# Patient Record
Sex: MALE | Race: WHITE | NOT HISPANIC OR LATINO | Employment: OTHER | URBAN - METROPOLITAN AREA
[De-identification: names, ages, dates, MRNs, and addresses within clinical notes are randomized per-mention and may not be internally consistent; named-entity substitution may affect disease eponyms.]

---

## 2017-03-30 DIAGNOSIS — I10 BENIGN ESSENTIAL HYPERTENSION: ICD-10-CM

## 2017-03-30 RX ORDER — LISINOPRIL AND HYDROCHLOROTHIAZIDE 12.5; 2 MG/1; MG/1
TABLET ORAL
Qty: 90 TABLET | Refills: 0 | Status: SHIPPED | OUTPATIENT
Start: 2017-03-30 | End: 2017-06-24

## 2017-03-30 NOTE — TELEPHONE ENCOUNTER
Prescription approved per [facility].  Juani Forrester RN

## 2017-03-30 NOTE — TELEPHONE ENCOUNTER
lisinopril-hydrochlorothiazide (PRINZIDE,ZESTORETIC) 20-12.5 MG per tablet 90 tablet 0 9/29/2016  No   Sig: TAKE 1 TABLET BY MOUTH DAILY           Last Written Prescription Date: 09/29/2016  Last Fill Quantity: 90, # refills: 0  Last Office Visit with [facility], P or [facility] prescribing provider: 01/2017       Potassium   Date Value Ref Range Status   06/06/2016 3.8 3.4 - 5.3 mmol/L Final     Creatinine   Date Value Ref Range Status   06/06/2016 0.80 0.66 - 1.25 mg/dL Final     BP Readings from Last 3 Encounters:   12/07/16 126/80   06/06/16 136/86   04/06/16 156/89

## 2017-06-24 DIAGNOSIS — I10 BENIGN ESSENTIAL HYPERTENSION: ICD-10-CM

## 2017-06-26 RX ORDER — LISINOPRIL AND HYDROCHLOROTHIAZIDE 12.5; 2 MG/1; MG/1
TABLET ORAL
Qty: 90 TABLET | Refills: 0 | Status: SHIPPED | OUTPATIENT
Start: 2017-06-26 | End: 2017-08-10

## 2017-06-26 NOTE — TELEPHONE ENCOUNTER
Routing to TC pool. Pt is due for full physical with labs. Please help him schedule. 90 day script sent in.     Shira Degroot RN

## 2017-08-10 ENCOUNTER — OFFICE VISIT (OUTPATIENT)
Dept: FAMILY MEDICINE | Facility: CLINIC | Age: 57
End: 2017-08-10
Payer: COMMERCIAL

## 2017-08-10 VITALS
RESPIRATION RATE: 18 BRPM | DIASTOLIC BLOOD PRESSURE: 84 MMHG | OXYGEN SATURATION: 98 % | HEART RATE: 79 BPM | TEMPERATURE: 97.8 F | WEIGHT: 238 LBS | HEIGHT: 73 IN | BODY MASS INDEX: 31.54 KG/M2 | SYSTOLIC BLOOD PRESSURE: 137 MMHG

## 2017-08-10 DIAGNOSIS — Z11.59 NEED FOR HEPATITIS C SCREENING TEST: ICD-10-CM

## 2017-08-10 DIAGNOSIS — Z12.5 SCREENING FOR PROSTATE CANCER: ICD-10-CM

## 2017-08-10 DIAGNOSIS — F17.200 TOBACCO USE DISORDER: ICD-10-CM

## 2017-08-10 DIAGNOSIS — I10 BENIGN ESSENTIAL HYPERTENSION: ICD-10-CM

## 2017-08-10 DIAGNOSIS — D12.6 ADENOMATOUS POLYP OF COLON, UNSPECIFIED PART OF COLON: ICD-10-CM

## 2017-08-10 DIAGNOSIS — Z00.00 ROUTINE GENERAL MEDICAL EXAMINATION AT A HEALTH CARE FACILITY: Primary | ICD-10-CM

## 2017-08-10 PROCEDURE — 99396 PREV VISIT EST AGE 40-64: CPT | Performed by: INTERNAL MEDICINE

## 2017-08-10 RX ORDER — LISINOPRIL AND HYDROCHLOROTHIAZIDE 12.5; 2 MG/1; MG/1
1 TABLET ORAL DAILY
Qty: 90 TABLET | Refills: 3 | Status: SHIPPED | OUTPATIENT
Start: 2017-08-10 | End: 2018-08-20

## 2017-08-10 NOTE — MR AVS SNAPSHOT
After Visit Summary   8/10/2017    Ricki Acuna    MRN: [id number]           Patient Information     Date Of Birth          1960        Visit Information        Provider Department      8/10/2017 9:00 AM Bryan Fragoso MD [facility]        Today's Diagnoses     Routine general medical examination at a health care facility    -  1    Tobacco use disorder        Need for hepatitis C screening test        Benign essential hypertension        Screening for prostate cancer        Adenomatous polyp of colon, unspecified part of colon          Care Instructions      Preventive Health Recommendations  Male Ages 50 - 64    Yearly exam:             See your health care provider every year in order to  o   Review health changes.   o   Discuss preventive care.    o   Review your medicines if your doctor has prescribed any.     Have a cholesterol test every 5 years, or more frequently if you are at risk for high cholesterol/heart disease.     Have a diabetes test (fasting glucose) every three years. If you are at risk for diabetes, you should have this test more often.     Have a colonoscopy at age 50, or have a yearly FIT test (stool test). These exams will check for colon cancer.      Talk with your health care provider about whether or not a prostate cancer screening test (PSA) is right for you.    You should be tested each year for STDs (sexually transmitted diseases), if you re at risk.     Shots: Get a flu shot each year. Get a tetanus shot every 10 years.     Nutrition:    Eat at least 5 servings of fruits and vegetables daily.     Eat whole-grain bread, whole-wheat pasta and brown rice instead of white grains and rice.     Talk to your provider about Calcium and Vitamin D.     Lifestyle    Exercise for at least 150 minutes a week (30 minutes a day, 5 days a week). This will help you control your weight and prevent disease.     Limit alcohol to one drink per day.     No smoking.  "    Wear sunscreen to prevent skin cancer.     See your dentist every six months for an exam and cleaning.     See your eye doctor every 1 to 2 years.            Follow-ups after your visit        Follow-up notes from your care team     Return in about 1 year (around 8/10/2018) for Physical Exam.      Future tests that were ordered for you today     Open Future Orders        Priority Expected Expires Ordered    Vitamin D Deficiency Routine  8/10/2018 8/10/2017    Hepatitis C Screen Reflex to HCV RNA Quant and Genotype Routine  8/10/2018 8/10/2017    Comprehensive metabolic panel Routine  8/10/2018 8/10/2017    Prostate spec antigen screen Routine  8/10/2018 8/10/2017    TSH Routine  8/10/2018 8/10/2017    CBC with platelets Routine  8/10/2018 8/10/2017    Lipid panel reflex to direct LDL Routine  8/10/2018 8/10/2017            Who to contact     If you have questions or need follow up information about today's clinic visit or your schedule please contact [facility] directly at [phone].  Normal or non-critical lab and imaging results will be communicated to you by Calixarhart, letter or phone within 4 business days after the clinic has received the results. If you do not hear from us within 7 days, please contact the clinic through Calixarhart or phone. If you have a critical or abnormal lab result, we will notify you by phone as soon as possible.  Submit refill requests through [email or web address] or call your pharmacy and they will forward the refill request to us. Please allow 3 business days for your refill to be completed.          Additional Information About Your Visit        CalixarharEventBoard Information     [email or web address] lets you send messages to your doctor, view your test results, renew your prescriptions, schedule appointments and more. To sign up, go to [email or web address] . Click on \"Log in\" on the left side of the screen, which will take you to the Welcome page. Then click on \"Sign up Now\" on the right side of the " "page.     You will be asked to enter the access code listed below, as well as some personal information. Please follow the directions to create your username and password.     Your access code is: [email or web address]  Expires: 2017  9:44 AM     Your access code will  in 90 days. If you need help or a new code, please call your [facility] clinic or [phone].        Care EveryWhere ID     This is your Care EveryWhere ID. This could be used by other organizations to access your [facility] medical records  [id number]        Your Vitals Were     Pulse Temperature Respirations Height Pulse Oximetry BMI (Body Mass Index)    79 97.8  F (36.6  C) (Tympanic) 18 6' 1\" (1.854 m) 98% 31.4 kg/m2       Blood Pressure from Last 3 Encounters:   08/10/17 137/84   16 126/80   16 136/86    Weight from Last 3 Encounters:   08/10/17 238 lb (108 kg)   16 235 lb (106.6 kg)   16 237 lb (107.5 kg)                 Today's Medication Changes          These changes are accurate as of: 8/10/17  9:44 AM.  If you have any questions, ask your nurse or doctor.               These medicines have changed or have updated prescriptions.        Dose/Directions    lisinopril-hydrochlorothiazide 20-12.5 MG per tablet   Commonly known as:  PRINZIDE/ZESTORETIC   This may have changed:  See the new instructions.   Used for:  Benign essential hypertension   Changed by:  Bryan Fragoso MD        Dose:  1 tablet   Take 1 tablet by mouth daily   Quantity:  90 tablet   Refills:  3            Where to get your medicines      These medications were sent to [id number] - [place] - [address] AT [address] [place] [address] & [facility]  [address] [address]    Hours:  24-hours Phone:  [phone]     lisinopril-hydrochlorothiazide 20-12.5 MG per tablet                Primary Care Provider Office Phone # Fax #    Bryan Fragoso [phone] [address] [address]        Equal " Access to Services     [facility]: Hadii aad ku hadwellingtonzoya Sylviamonica, wahoraceda luqadaha, qaybta kavaibhavradha garcia. So [facility] [phone].    ATENCIÓN: Si habla español, tiene a de leon disposición servicios gratuitos de asistencia lingüística. Llame al [phone].    We comply with applicable federal civil rights laws and [place] laws. We do not discriminate on the basis of race, color, national origin, age, disability sex, sexual orientation or gender identity.            Thank you!     Thank you for choosing [facility]  for your care. Our goal is always to provide you with excellent care. Hearing back from our patients is one way we can continue to improve our services. Please take a few minutes to complete the written survey that you may receive in the mail after your visit with us. Thank you!             Your Updated Medication List - Protect others around you: Learn how to safely use, store and throw away your medicines at www.disposemymeds.org.          This list is accurate as of: 8/10/17  9:44 AM.  Always use your most recent med list.                   Brand Name Dispense Instructions for use Diagnosis    aspirin 81 MG tablet     30 tablet    Take by mouth daily        lisinopril-hydrochlorothiazide 20-12.5 MG per tablet    PRINZIDE/ZESTORETIC    90 tablet    Take 1 tablet by mouth daily    Benign essential hypertension

## 2017-08-10 NOTE — NURSING NOTE
"Chief Complaint   Patient presents with     Physical     Not Fasting       Initial /87 (BP Location: Right arm, Patient Position: Chair, Cuff Size: Adult Large)  Pulse 79  Temp 97.8  F (36.6  C) (Tympanic)  Resp 18  Ht 6' 1\" (1.854 m)  Wt 238 lb (108 kg)  SpO2 98%  BMI 31.4 kg/m2 Estimated body mass index is 31.4 kg/(m^2) as calculated from the following:    Height as of this encounter: 6' 1\" (1.854 m).    Weight as of this encounter: 238 lb (108 kg).  Medication Reconciliation: complete   Aurelia Dallas CMA (AAMA)      "

## 2017-08-10 NOTE — PROGRESS NOTES
SUBJECTIVE:   CC: Ricki Acuna is an 56 year old male who presents for preventative health visit.     Healthy Habits:    Do you get at least three servings of calcium containing foods daily (dairy, green leafy vegetables, etc.)? yes    Amount of exercise or daily activities, outside of work: active-at work only    Problems taking medications regularly No    Medication side effects: No    Have you had an eye exam in the past two years? no    Do you see a dentist twice per year? no    Do you have sleep apnea, excessive snoring or daytime drowsiness?no              Today's PHQ-2 Score:   PHQ-2 ( 1999 [employer]) 8/10/2017 12/7/2016   Q1: Little interest or pleasure in doing things 0 0   Q2: Feeling down, depressed or hopeless 0 0   PHQ-2 Score 0 0       Abuse: Current or Past(Physical, Sexual or Emotional)- No  Do you feel safe in your environment - Yes    Social History   Substance Use Topics     Smoking status: Current Every Day Smoker     Packs/day: 1.00     Types: Cigarettes     Smokeless tobacco: Never Used     Alcohol use 0.0 oz/week     0 Standard drinks or equivalent per week      Comment: 1/day, wine     The patient does not drink >3 drinks per day nor >7 drinks per week.    Last PSA:   PSA   Date Value Ref Range Status   01/09/2015 0.7 ng/mL Final       Reviewed orders with patient. Reviewed health maintenance and updated orders accordingly - Yes  Patient Active Problem List   Diagnosis     Benign essential hypertension     Adenomatous colon polyp     Hyperlipidemia LDL goal <160     Tobacco use disorder     Past Surgical History:   Procedure Laterality Date     CHOLECYSTECTOMY      open     COLONOSCOPY  3/20/15    hx polyps     COLONOSCOPY N/A 3/20/2015    Procedure: COMBINED COLONOSCOPY, SINGLE OR MULTIPLE BIOPSY/POLYPECTOMY BY BIOPSY;  Surgeon: Chun Joe MD;  Location:  GI     HERNIA REPAIR      inguinal hernia repair       Social History   Substance Use Topics     Smoking status: Current Every  "Day Smoker     Packs/day: 1.00     Types: Cigarettes     Smokeless tobacco: Never Used     Alcohol use 0.0 oz/week     0 Standard drinks or equivalent per week      Comment: 1/day, wine     Family History   Problem Relation Age of Onset     Prostate Cancer Father          Current Outpatient Prescriptions   Medication Sig Dispense Refill     lisinopril-hydrochlorothiazide (PRINZIDE/ZESTORETIC) 20-12.5 MG per tablet Take 1 tablet by mouth daily 90 tablet 3     aspirin 81 MG tablet Take by mouth daily 30 tablet      [DISCONTINUED] lisinopril-hydrochlorothiazide (PRINZIDE/ZESTORETIC) 20-12.5 MG per tablet TAKE 1 TABLET BY MOUTH EVERY DAY 90 tablet 0     No Known Allergies      Reviewed and updated as needed this visit by clinical staffTobacco  Allergies  Meds  Med Hx  Surg Hx  Fam Hx  Soc Hx        Reviewed and updated as needed this visit by Provider        Past Medical History:   Diagnosis Date     Adenomatous colon polyp 3-20-15    repeat in 3 years      Hypertension      Skin cancer     Dr. Antoine Aguirre MD DERMATOLOGY SPECIALIST       Past Surgical History:   Procedure Laterality Date     CHOLECYSTECTOMY      open     COLONOSCOPY  3/20/15    hx polyps     COLONOSCOPY N/A 3/20/2015    Procedure: COMBINED COLONOSCOPY, SINGLE OR MULTIPLE BIOPSY/POLYPECTOMY BY BIOPSY;  Surgeon: Chun Joe MD;  Location:  GI     HERNIA REPAIR      inguinal hernia repair       ROS:  10 organ system ROS negative     OBJECTIVE:   /84  Pulse 79  Temp 97.8  F (36.6  C) (Tympanic)  Resp 18  Ht 6' 1\" (1.854 m)  Wt 238 lb (108 kg)  SpO2 98%  BMI 31.4 kg/m2  EXAM:  GENERAL: healthy, alert and no distress  EYES: Eyes grossly normal to inspection, PERRL and conjunctivae and sclerae normal  HENT: ear canals and TM's normal, nose and mouth without ulcers or lesions  NECK: no adenopathy, no asymmetry, masses, or scars and thyroid normal to palpation  RESP: lungs clear to auscultation - no rales, rhonchi or wheezes  CV: " regular rate and rhythm, normal S1 S2, no S3 or S4, no murmur, click or rub, no peripheral edema and peripheral pulses strong  ABDOMEN: soft, nontender, no hepatosplenomegaly, no masses and bowel sounds normal  RECTAL: normal sphincter tone, no rectal masses, prostate normal size, smooth, nontender without nodules or masses  MS: no gross musculoskeletal defects noted, no edema  SKIN: no suspicious lesions or rashes  NEURO: Normal strength and tone, mentation intact and speech normal  PSYCH: mentation appears normal, affect normal/bright    ASSESSMENT/PLAN:   1. Routine general medical examination at a health care facility    - Comprehensive metabolic panel; Future  - TSH; Future  - CBC with platelets; Future  - Lipid panel reflex to direct LDL; Future  - Vitamin D Deficiency; Future    2. Tobacco use disorder  Counseled on tobacco cessaion    3. Need for hepatitis C screening test    - Hepatitis C Screen Reflex to HCV RNA Quant and Genotype; Future    4. Benign essential hypertension  Adequate blood pressure control   - lisinopril-hydrochlorothiazide (PRINZIDE/ZESTORETIC) 20-12.5 MG per tablet; Take 1 tablet by mouth daily  Dispense: 90 tablet; Refill: 3    5. Screening for prostate cancer    - Prostate spec antigen screen; Future    6. Adenomatous polyp of colon, unspecified part of colon  Reminded to schedule surveillance colonoscopy in 3/2018       COUNSELING:  Reviewed preventive health counseling, as reflected in patient instructions       Regular exercise       Healthy diet/nutrition       Consider Hep C screening for patients born between 1945 and 1965       Colon cancer screening       Prostate cancer screening           reports that he has been smoking Cigarettes.  He has been smoking about 1.00 pack per day. He has never used smokeless tobacco.  Tobacco Cessation Action Plan: Self help information given to patient  Estimated body mass index is 31.4 kg/(m^2) as calculated from the following:    Height as of  "this encounter: 6' 1\" (1.854 m).    Weight as of this encounter: 238 lb (108 kg).   Weight management plan: Discussed healthy diet and exercise guidelines and patient will follow up in 12 months in clinic to re-evaluate.    Counseling Resources:  ATP IV Guidelines  Pooled Cohorts Equation Calculator  FRAX Risk Assessment  ICSI Preventive Guidelines  Dietary Guidelines for Americans, 2010  USDA's MyPlate  ASA Prophylaxis  Lung CA Screening    Bryan Fragoso MD  [facility]    The 10-year ASCVD risk score (Yahairaesther VERMA Jr, et al., 2013) is: 16.5%    Values used to calculate the score:      Age: 56 years      Sex: Male      Is Non- : No      Diabetic: No      Tobacco smoker: Yes      Systolic Blood Pressure: 137 mmHg      Is BP treated: Yes      HDL Cholesterol: 35 mg/dL      Total Cholesterol: 191 mg/dL    "

## 2017-08-11 DIAGNOSIS — Z11.59 NEED FOR HEPATITIS C SCREENING TEST: ICD-10-CM

## 2017-08-11 DIAGNOSIS — Z12.5 SCREENING FOR PROSTATE CANCER: ICD-10-CM

## 2017-08-11 DIAGNOSIS — Z00.00 ROUTINE GENERAL MEDICAL EXAMINATION AT A HEALTH CARE FACILITY: ICD-10-CM

## 2017-08-11 LAB
ALBUMIN SERPL-MCNC: 3.9 G/DL (ref 3.4–5)
ALP SERPL-CCNC: 114 U/L (ref 40–150)
ALT SERPL W P-5'-P-CCNC: 46 U/L (ref 0–70)
ANION GAP SERPL CALCULATED.3IONS-SCNC: 8 MMOL/L (ref 3–14)
AST SERPL W P-5'-P-CCNC: 21 U/L (ref 0–45)
BILIRUB SERPL-MCNC: 0.6 MG/DL (ref 0.2–1.3)
BUN SERPL-MCNC: 11 MG/DL (ref 7–30)
CALCIUM SERPL-MCNC: 8.9 MG/DL (ref 8.5–10.1)
CHLORIDE SERPL-SCNC: 105 MMOL/L (ref 94–109)
CHOLEST SERPL-MCNC: 180 MG/DL
CO2 SERPL-SCNC: 25 MMOL/L (ref 20–32)
CREAT SERPL-MCNC: 0.81 MG/DL (ref 0.66–1.25)
DEPRECATED CALCIDIOL+CALCIFEROL SERPL-MC: 33 UG/L (ref 20–75)
ERYTHROCYTE [DISTWIDTH] IN BLOOD BY AUTOMATED COUNT: 13.2 % (ref 10–15)
GFR SERPL CREATININE-BSD FRML MDRD: ABNORMAL ML/MIN/1.7M2
GLUCOSE SERPL-MCNC: 114 MG/DL (ref 70–99)
HCT VFR BLD AUTO: 42.6 % (ref 40–53)
HCV AB SERPL QL IA: NORMAL
HDLC SERPL-MCNC: 31 MG/DL
HGB BLD-MCNC: 14 G/DL (ref 13.3–17.7)
LDLC SERPL CALC-MCNC: 123 MG/DL
MCH RBC QN AUTO: 30.6 PG (ref 26.5–33)
MCHC RBC AUTO-ENTMCNC: 32.9 G/DL (ref 31.5–36.5)
MCV RBC AUTO: 93 FL (ref 78–100)
NONHDLC SERPL-MCNC: 149 MG/DL
PLATELET # BLD AUTO: 315 10E9/L (ref 150–450)
POTASSIUM SERPL-SCNC: 4.1 MMOL/L (ref 3.4–5.3)
PROT SERPL-MCNC: 7.1 G/DL (ref 6.8–8.8)
PSA SERPL-ACNC: 1.22 UG/L (ref 0–4)
RBC # BLD AUTO: 4.58 10E12/L (ref 4.4–5.9)
SODIUM SERPL-SCNC: 138 MMOL/L (ref 133–144)
TRIGL SERPL-MCNC: 132 MG/DL
TSH SERPL DL<=0.005 MIU/L-ACNC: 0.81 MU/L (ref 0.4–4)
WBC # BLD AUTO: 6.6 10E9/L (ref 4–11)

## 2017-08-11 PROCEDURE — 36415 COLL VENOUS BLD VENIPUNCTURE: CPT | Performed by: INTERNAL MEDICINE

## 2017-08-11 PROCEDURE — G0103 PSA SCREENING: HCPCS | Performed by: INTERNAL MEDICINE

## 2017-08-11 PROCEDURE — 84443 ASSAY THYROID STIM HORMONE: CPT | Performed by: INTERNAL MEDICINE

## 2017-08-11 PROCEDURE — 86803 HEPATITIS C AB TEST: CPT | Performed by: INTERNAL MEDICINE

## 2017-08-11 PROCEDURE — 82306 VITAMIN D 25 HYDROXY: CPT | Performed by: INTERNAL MEDICINE

## 2017-08-11 PROCEDURE — 80053 COMPREHEN METABOLIC PANEL: CPT | Performed by: INTERNAL MEDICINE

## 2017-08-11 PROCEDURE — 85027 COMPLETE CBC AUTOMATED: CPT | Performed by: INTERNAL MEDICINE

## 2017-08-11 PROCEDURE — 80061 LIPID PANEL: CPT | Performed by: INTERNAL MEDICINE

## 2017-08-11 NOTE — LETTER
"[facility]  [address] [facility]  [address]  [place]  [address]  Tel: [phone]    August 14, 2017    Ricki Acuna  [address]  [place] [place] [facility] [address]        Dear Mr. Acuna,    The following letter pertains to your most recent diagnostic tests:     -Your hepatitis C screening test is negative.  You do not have hepatitis C.     -Your vitamin D level is adequate     -Liver and gallbladder tests are normal for you. (ALT,AST, Alk phos, bilirubin), kidney function is normal for you (Creatinine, GFR), Sodium is normal, Potassium is normal for you, Calcium is normal for you.     -Your Glucose (blood sugar) is moderately elevated, but NOT in the diabetic range.       -TSH (thyroid stimulating hormone) level is normal which indicates normal circulating thyroid hormone levels.       -Your total cholesterol is 180 which is at your goal of total cholesterol less than 200.     -Your triglycerides are 132 which are at your goal of triglycerides less than 150.     -Your HDL or \"good cholesterol\" is 31 which is below your goal of HDL cholesterol greater than 40.     -Your LDL cholesterol or \"bad cholesterol\" is 123 which is at your goal of LDL cholesterol less than <130.  Your LDL goal is based on your risk factors for artery disease.     -Your prostate specific antigen (PSA) test result returned normal.     -Your complete blood counts including your hemoglobin returned normal for you.         Bottom line:   Your risk for having a heart attack over the next 10 years is elevated.  Based on these results I estimate a 17.1 % chance of heart attack over the next 10 years.  If you QUIT SMOKING and took atorvastatin (Lipitor) regularly, you could bring that 10-year risk down to 3.1 %.  Therefore, I would strongly recommend doing what every you can to quit smoking.  Also, you are a candidate for starting atorvastatin for preventive purposes.  You should be informed that a very small minority of people who take " atorvastatin can develop muscle pain and weakness as a side effect from that drug.  If you experience those side effects, then stop the drug and contact me.  If you start the medication, you should have a follow up fasting cholesterol panel and routine liver blood test after you have been taking the medication for 2 months.  You can schedule a lab appointment for that purpose.   Please inform me if you would like to get that drug started so I can send an prescritpion and   arrange for appropriate follow up.         Follow up:  Schedule an appointment for a physical examination with fasting blood tests in one year's time, or return sooner if new questions, symptoms or problems arise or if you want to get atorvastatin started.         Sincerely,    Bryan Fragoso MD/tiffanie          Results for orders placed or performed in visit on 08/11/17   Hepatitis C Screen Reflex to HCV RNA Quant and Genotype   Result Value Ref Range    Hepatitis C Antibody  NR     Nonreactive   Assay performance characteristics have not been established for newborns,   infants, and children     Comprehensive metabolic panel   Result Value Ref Range    Sodium 138 133 - 144 mmol/L    Potassium 4.1 3.4 - 5.3 mmol/L    Chloride 105 94 - 109 mmol/L    Carbon Dioxide 25 20 - 32 mmol/L    Anion Gap 8 3 - 14 mmol/L    Glucose 114 (H) 70 - 99 mg/dL    Urea Nitrogen 11 7 - 30 mg/dL    Creatinine 0.81 0.66 - 1.25 mg/dL    GFR Estimate >90  Non  GFR Calc   >60 mL/min/1.7m2    GFR Estimate If Black >90   GFR Calc   >60 mL/min/1.7m2    Calcium 8.9 8.5 - 10.1 mg/dL    Bilirubin Total 0.6 0.2 - 1.3 mg/dL    Albumin 3.9 3.4 - 5.0 g/dL    Protein Total 7.1 6.8 - 8.8 g/dL    Alkaline Phosphatase 114 40 - 150 U/L    ALT 46 0 - 70 U/L    AST 21 0 - 45 U/L   Prostate spec antigen screen   Result Value Ref Range    PSA 1.22 0 - 4 ug/L   TSH   Result Value Ref Range    TSH 0.81 0.40 - 4.00 mU/L   CBC with platelets   Result Value Ref Range     WBC 6.6 4.0 - 11.0 10e9/L    RBC Count 4.58 4.4 - 5.9 10e12/L    Hemoglobin 14.0 13.3 - 17.7 g/dL    Hematocrit 42.6 40.0 - 53.0 %    MCV 93 78 - 100 fl    MCH 30.6 26.5 - 33.0 pg    MCHC 32.9 31.5 - 36.5 g/dL    RDW 13.2 10.0 - 15.0 %    Platelet Count 315 150 - 450 10e9/L   Lipid panel reflex to direct LDL   Result Value Ref Range    Cholesterol 180 <200 mg/dL    Triglycerides 132 <150 mg/dL    HDL Cholesterol 31 (L) >39 mg/dL    LDL Cholesterol Calculated 123 (H) <100 mg/dL    Non HDL Cholesterol 149 (H) <130 mg/dL   Vitamin D Deficiency   Result Value Ref Range    Vitamin D Deficiency screening 33 20 - 75 ug/L

## 2017-08-12 NOTE — PROGRESS NOTES
"The following letter pertains to your most recent diagnostic tests:    -Your hepatitis C screening test is negative.  You do not have hepatitis C.     -Your vitamin D level is adequate    -Liver and gallbladder tests are normal for you. (ALT,AST, Alk phos, bilirubin), kidney function is normal for you (Creatinine, GFR), Sodium is normal, Potassium is normal for you, Calcium is normal for you.    -Your Glucose (blood sugar) is moderately elevated, but NOT in the diabetic range.      -TSH (thyroid stimulating hormone) level is normal which indicates normal circulating thyroid hormone levels.      -Your total cholesterol is 180 which is at your goal of total cholesterol less than 200.    -Your triglycerides are 132 which are at your goal of triglycerides less than 150.    -Your HDL or \"good cholesterol\" is 31 which is below your goal of HDL cholesterol greater than 40.    -Your LDL cholesterol or \"bad cholesterol\" is 123 which is at your goal of LDL cholesterol less than <130.  Your LDL goal is based on your risk factors for artery disease.     -Your prostate specific antigen (PSA) test result returned normal.     -Your complete blood counts including your hemoglobin returned normal for you.         Bottom line:   Your risk for having a heart attack over the next 10 years is elevated.  Based on these results I estimate a 17.1 % chance of heart attack over the next 10 years.  If you QUIT SMOKING and took atorvastatin (Lipitor) regularly, you could bring that 10-year risk down to 3.1 %.  Therefore, I would strongly recommend doing what every you can to quit smoking.  Also, you are a candidate for starting atorvastatin for preventive purposes.  You should be informed that a very small minority of people who take atorvastatin can develop muscle pain and weakness as a side effect from that drug.  If you experience those side effects, then stop the drug and contact me.  If you start the medication, you should have a follow " up fasting cholesterol panel and routine liver blood test after you have been taking the medication for 2 months.  You can schedule a lab appointment for that purpose.   Please inform me if you would like to get that drug started so I can send an prescritpion and  arrange for appropriate follow up.        Follow up:  Schedule an appointment for a physical examination with fasting blood tests in one year's time, or return sooner if new questions, symptoms or problems arise or if you want to get atorvastatin started.        Sincerely,    Dr. Fragoso    The 10-year ASCVD risk score (Yahairaesther VERMA Jr, et al., 2013) is: 17.1%    Values used to calculate the score:      Age: 56 years      Sex: Male      Is Non- : No      Diabetic: No      Tobacco smoker: Yes      Systolic Blood Pressure: 137 mmHg      Is BP treated: Yes      HDL Cholesterol: 31 mg/dL      Total Cholesterol: 180 mg/dL

## 2018-02-06 ENCOUNTER — APPOINTMENT (OUTPATIENT)
Dept: GENERAL RADIOLOGY | Facility: CLINIC | Age: 58
End: 2018-02-06
Attending: EMERGENCY MEDICINE
Payer: COMMERCIAL

## 2018-02-06 ENCOUNTER — HOSPITAL ENCOUNTER (EMERGENCY)
Facility: CLINIC | Age: 58
Discharge: HOME | End: 2018-02-06
Attending: EMERGENCY MEDICINE | Admitting: EMERGENCY MEDICINE
Payer: COMMERCIAL

## 2018-02-06 VITALS
BODY MASS INDEX: 31.73 KG/M2 | HEIGHT: 73 IN | TEMPERATURE: 98.7 F | RESPIRATION RATE: 18 BRPM | SYSTOLIC BLOOD PRESSURE: 143 MMHG | WEIGHT: 239.42 LBS | HEART RATE: 90 BPM | DIASTOLIC BLOOD PRESSURE: 95 MMHG | OXYGEN SATURATION: 95 %

## 2018-02-06 DIAGNOSIS — R10.13 ABDOMINAL PAIN, EPIGASTRIC: ICD-10-CM

## 2018-02-06 DIAGNOSIS — R79.89 ELEVATED LFTS: ICD-10-CM

## 2018-02-06 LAB
ALBUMIN SERPL-MCNC: 3.7 G/DL (ref 3.4–5)
ALP SERPL-CCNC: 115 U/L (ref 40–150)
ALT SERPL W P-5'-P-CCNC: 86 U/L (ref 0–70)
ANION GAP SERPL CALCULATED.3IONS-SCNC: 7 MMOL/L (ref 3–14)
AST SERPL W P-5'-P-CCNC: 108 U/L (ref 0–45)
BASOPHILS # BLD AUTO: 0 10E9/L (ref 0–0.2)
BASOPHILS NFR BLD AUTO: 0.4 %
BILIRUB DIRECT SERPL-MCNC: 0.1 MG/DL (ref 0–0.2)
BILIRUB SERPL-MCNC: 0.3 MG/DL (ref 0.2–1.3)
BUN SERPL-MCNC: 12 MG/DL (ref 7–30)
CALCIUM SERPL-MCNC: 8.4 MG/DL (ref 8.5–10.1)
CHLORIDE SERPL-SCNC: 103 MMOL/L (ref 94–109)
CO2 SERPL-SCNC: 28 MMOL/L (ref 20–32)
CREAT SERPL-MCNC: 0.86 MG/DL (ref 0.66–1.25)
D DIMER PPP FEU-MCNC: <0.3 UG/ML FEU (ref 0–0.5)
DIFFERENTIAL METHOD BLD: NORMAL
EOSINOPHIL # BLD AUTO: 0.2 10E9/L (ref 0–0.7)
EOSINOPHIL NFR BLD AUTO: 2 %
ERYTHROCYTE [DISTWIDTH] IN BLOOD BY AUTOMATED COUNT: 13.1 % (ref 10–15)
GFR SERPL CREATININE-BSD FRML MDRD: >90 ML/MIN/1.7M2
GLUCOSE SERPL-MCNC: 113 MG/DL (ref 70–99)
HCT VFR BLD AUTO: 42.7 % (ref 40–53)
HGB BLD-MCNC: 14.6 G/DL (ref 13.3–17.7)
IMM GRANULOCYTES # BLD: 0 10E9/L (ref 0–0.4)
IMM GRANULOCYTES NFR BLD: 0.2 %
LIPASE SERPL-CCNC: 190 U/L (ref 73–393)
LYMPHOCYTES # BLD AUTO: 2.2 10E9/L (ref 0.8–5.3)
LYMPHOCYTES NFR BLD AUTO: 26.7 %
MCH RBC QN AUTO: 31.1 PG (ref 26.5–33)
MCHC RBC AUTO-ENTMCNC: 34.2 G/DL (ref 31.5–36.5)
MCV RBC AUTO: 91 FL (ref 78–100)
MONOCYTES # BLD AUTO: 0.5 10E9/L (ref 0–1.3)
MONOCYTES NFR BLD AUTO: 6.1 %
NEUTROPHILS # BLD AUTO: 5.2 10E9/L (ref 1.6–8.3)
NEUTROPHILS NFR BLD AUTO: 64.6 %
NRBC # BLD AUTO: 0 10*3/UL
NRBC BLD AUTO-RTO: 0 /100
PLATELET # BLD AUTO: 268 10E9/L (ref 150–450)
POTASSIUM SERPL-SCNC: 3.6 MMOL/L (ref 3.4–5.3)
PROT SERPL-MCNC: 6.9 G/DL (ref 6.8–8.8)
RBC # BLD AUTO: 4.7 10E12/L (ref 4.4–5.9)
SODIUM SERPL-SCNC: 138 MMOL/L (ref 133–144)
TROPONIN I SERPL-MCNC: <0.015 UG/L (ref 0–0.04)
WBC # BLD AUTO: 8.1 10E9/L (ref 4–11)

## 2018-02-06 PROCEDURE — 85025 COMPLETE CBC W/AUTO DIFF WBC: CPT | Performed by: EMERGENCY MEDICINE

## 2018-02-06 PROCEDURE — 76705 ECHO EXAM OF ABDOMEN: CPT

## 2018-02-06 PROCEDURE — 71046 X-RAY EXAM CHEST 2 VIEWS: CPT

## 2018-02-06 PROCEDURE — 93005 ELECTROCARDIOGRAM TRACING: CPT

## 2018-02-06 PROCEDURE — 80076 HEPATIC FUNCTION PANEL: CPT | Performed by: EMERGENCY MEDICINE

## 2018-02-06 PROCEDURE — 99285 EMERGENCY DEPT VISIT HI MDM: CPT | Mod: 25

## 2018-02-06 PROCEDURE — 83690 ASSAY OF LIPASE: CPT | Performed by: EMERGENCY MEDICINE

## 2018-02-06 PROCEDURE — 80048 BASIC METABOLIC PNL TOTAL CA: CPT | Performed by: EMERGENCY MEDICINE

## 2018-02-06 PROCEDURE — 84484 ASSAY OF TROPONIN QUANT: CPT | Performed by: EMERGENCY MEDICINE

## 2018-02-06 PROCEDURE — 85379 FIBRIN DEGRADATION QUANT: CPT | Performed by: EMERGENCY MEDICINE

## 2018-02-06 ASSESSMENT — ENCOUNTER SYMPTOMS
ABDOMINAL PAIN: 1
SHORTNESS OF BREATH: 1
NAUSEA: 1
CHEST TIGHTNESS: 1

## 2018-02-06 NOTE — ED AVS SNAPSHOT
Emergency Department    [address] [facility] [address]    Phone:  [phone]    Fax:  [phone]                                       Ricki Acuna   MRN: [id number]    Department:   Emergency Department   Date of Visit:  2/6/2018           Patient Information     Date Of Birth          1960        Your diagnoses for this visit were:     Abdominal pain, epigastric     Elevated LFTs        You were seen by Jose Calderón MD.      Follow-up Information     Follow up with Bryan Fragoso MD In 2 weeks.    Specialty:  Internal Medicine    Contact information:    [facility]  [address] [place] [facility]   [facility] [address]  [phone]          Discharge Instructions         Epigastric Pain (Uncertain Cause)     Epigastric pain can be a sign of disease in the upper abdomen. Common causes include:    Acid reflux (stomach acid flowing up into the esophagus)    Gastritis (irritation of the stomach lining)    Peptic Ulcer Disease    Inflammation of the pancreas    Gallstone    Infection in the gallbladder  Pain may be dull or burning. It may spread upward to the chest or to the back. There may be other symptoms such as belching, bloating, cramps or hunger pains. There may be weight loss or poor appetite, nausea or vomiting.  Since the diagnosis of your pain is not certain yet, further tests may sometimes be needed. Sometimes the doctor will treat you for the most likely condition to see if there is improvement before doing further tests.  Home care  Medicines    Antacids help neutralize the normal acids in your stomach. Examples are Maalox, Mylanta, Rolaids, and Tums. If you don t like the liquid, you can also try a chewable one. You may find one works better than another for you. Overuse can cause diarrhea or constipation.    Acid blockers (H2 blockers) decrease acid production. Examples are cimetidine (Tagamet), famotidine (Pepcid) and ranitidine (Zantac).    Acid inhibitors  (PPIs) decrease acid production in a different way than the blockers. You may find they work better, but can take a little longer to take effect.  Examples are omeprazole (Prilosec), lansoprazole (Prevacid), pantoprazole (Protonix), rabeprazole (Aciphex), and esomeprazole (Nexium).    Take an antacid 30-60 minutes after eating and at bedtime, but not at the same time as an acid blocker.    Try not to take NSAIDs. Aspirin may also cause problems, but if taking it for your heart or other medical reasons, talk to your doctor before stopping it; you do not want to cause a worse problem, like a heart attack or stroke.  Diet    If certain foods seem to cause your spasm, try to avoid them.     Eat slowly and chew food well before swallowing. Symptoms of gastritis can be worsened by certain foods. Limit or avoid fatty, fried, and spicy foods, as well as coffee, chocolate, mint, and foods with high acid content such as tomatoes and citrus fruit and juices (orange, grapefruit, lemon).    Avoid alcohol, caffeine, and tobacco, which can delay healing and worsen your problem.    Try eating smaller meals with snacks in between  Follow-up care  Follow up with your healthcare provider or as advised.  When to seek medical advice  Call your healthcare provider right away if any of the following occur:    Stomach pain worsens or moves to the right lower part of the abdomen    Chest pain appears, or if it worsens or spreads to the chest, back, neck, shoulder, or arm    Frequent vomiting (can t keep down liquids)    Blood in the stool or vomit (red or black color)    Feeling weak or dizzy, fainting, or having trouble breathing    Fever of 100.4 F (38 C) or higher, or as directed by your healthcare provider    Abdominal swelling  Date Last Reviewed: 9/25/2015 2000-2017 [employer]. [address] [place] [address], [place] [address]. All rights reserved. This information is not intended as a substitute for professional medical  care. Always follow your healthcare professional's instructions.      Discharge Instructions  Abdominal Pain    Abdominal pain (belly pain) can be caused by many things. Your evaluation today does not show the exact cause for your pain. Your provider today has decided that it is unlikely your pain is due to a life threatening problem, or a problem requiring surgery or hospital admission. Sometimes those problems cannot be found right away, so it is very important that you follow up as directed.  Sometimes only the changes which occur over time allow the cause of your pain to be found.    Generally, every Emergency Department visit should have a follow-up clinic visit with either a primary or a specialty clinic/provider. Please follow-up as instructed by your emergency provider today. With abdominal pain, we often recommend very close follow-up, such as the following day.    ADULTS:  Return to the Emergency Department right away if:      You get an oral temperature above 102oF or as directed by your provider.    You have blood in your stools. This may be bright red or appear as black, tarry stools.      You keep vomiting (throwing up) or cannot drink liquids.    You see blood when you vomit.     You cannot have a bowel movement or you cannot pass gas.    Your stomach gets bloated or bigger.    Your skin or the whites of your eyes look yellow.    You faint.    You have bloody, frequent or painful urination (peeing).    You have new symptoms or anything that worries you.    CHILDREN:  Return to the Emergency Department right away if your child has any of the above-listed symptoms or the following:      Pushes your hand away or screams/cries when his/her belly is touched.    You notice your child is very fussy or weak.    Your child is very tired and is too tired to eat or drink.    Your child is dehydrated.  Signs of dehydration can be:  o Significant change in the amount of wet diapers/urine.  o Your infant or child  starts to have dry mouth and lips, or no saliva (spit) or tears.    PREGNANT WOMEN:  Return to the Emergency Department right away if you have any of the above-listed symptoms or the following:      You have bleeding, leaking fluid or passing tissue from the vagina.    You have worse pain or cramping, or pain in your shoulder or back.    You have vomiting that will not stop.    You have a temperature of 100oF or more.    Your baby is not moving as much as usual.    You faint.    You get a bad headache with or without eye problems and abdominal pain.    You have a seizure.    You have unusual discharge from your vagina and abdominal pain.    Abdominal pain is pretty common during pregnancy.  Your pain may or may not be related to your pregnancy. You should follow-up closely with your OB provider so they can evaluate you and your baby.  Until you follow-up with your regular provider, do the following:       Avoid sex and do not put anything in your vagina.    Drink clear fluids.    Only take medications approved by your provider.    MORE INFORMATION:    Appendicitis:  A possible cause of abdominal pain in any person who still has their appendix is acute appendicitis. Appendicitis is often hard to diagnose.  Testing does not always rule out early appendicitis or other causes of abdominal pain. Close follow-up with your provider and re-evaluations may be needed to figure out the reason for your abdominal pain.    Follow-up:  It is very important that you make an appointment with your clinic and go to the appointment.  If you do not follow-up with your primary provider, it may result in missing an important development which could result in permanent injury or disability and/or lasting pain.  If there is any problem keeping your appointment, call your provider or return to the Emergency Department.    Medications:  Take your medications as directed by your provider today.  Before using over-the-counter medications, ask  "your provider and make sure to take the medications as directed.  If you have any questions about medications, ask your provider.    Diet:  Resume your normal diet as much as possible, but do not eat fried, fatty or spicy foods while you have pain.  Do not drink alcohol or have caffeine.  Do not smoke tobacco.    Probiotics: If you have been given an antibiotic, you may want to also take a probiotic pill or eat yogurt with live cultures. Probiotics have \"good bacteria\" to help your intestines stay healthy. Studies have shown that probiotics help prevent diarrhea (loose stools) and other intestine problems (including C. diff infection) when you take antibiotics. You can buy these without a prescription in the pharmacy section of the store.     If you were given a prescription for medicine here today, be sure to read all of the information (including the package insert) that comes with your prescription.  This will include important information about the medicine, its side effects, and any warnings that you need to know about.  The pharmacist who fills the prescription can provide more information and answer questions you may have about the medicine.  If you have questions or concerns that the pharmacist cannot address, please call or return to the Emergency Department.       Remember that you can always come back to the Emergency Department if you are not able to see your regular provider in the amount of time listed above, if you get any new symptoms, or if there is anything that worries you.        These have your liver test repeated in 1-2 weeks with Dr. Fragoso    24 Hour Appointment Hotline       To make an appointment at any [facility], call [phone] ([phone]). If you don't have a family doctor or clinic, we will help you find one. [facility] are conveniently located to serve the needs of you and your family.             Review of your medicines      Our records show that you are taking " the medicines listed below. If these are incorrect, please call your family doctor or clinic.        Dose / Directions Last dose taken    aspirin 81 MG tablet   Quantity:  30 tablet        Take by mouth daily   Refills:  0        lisinopril-hydrochlorothiazide 20-12.5 MG per tablet   Commonly known as:  PRINZIDE/ZESTORETIC   Dose:  1 tablet   Quantity:  90 tablet        Take 1 tablet by mouth daily   Refills:  3                Procedures and tests performed during your visit     Basic metabolic panel    CBC with platelets differential    D dimer quantitative    EKG 12 lead    Hepatic panel    Lipase    POC US ABDOMEN LIMITED    Troponin I    XR Chest 2 Views      Orders Needing Specimen Collection     None      Pending Results     No orders found from 2/4/2018 to 2/7/2018.            Pending Culture Results     No orders found from 2/4/2018 to 2/7/2018.            Pending Results Instructions     If you had any lab results that were not finalized at the time of your Discharge, you can call the ED Lab Result RN at [phone]. You will be contacted by this team for any positive Lab results or changes in treatment. The nurses are available 7 days a week from 10A to 6:30P.  You can leave a message 24 hours per day and they will return your call.        Test Results From Your Hospital Stay        2/6/2018  9:21 PM      Component Results     Component Value Ref Range & Units Status    WBC 8.1 4.0 - 11.0 10e9/L Final    RBC Count 4.70 4.4 - 5.9 10e12/L Final    Hemoglobin 14.6 13.3 - 17.7 g/dL Final    Hematocrit 42.7 40.0 - 53.0 % Final    MCV 91 78 - 100 fl Final    MCH 31.1 26.5 - 33.0 pg Final    MCHC 34.2 31.5 - 36.5 g/dL Final    RDW 13.1 10.0 - 15.0 % Final    Platelet Count 268 150 - 450 10e9/L Final    Diff Method Automated Method  Final    % Neutrophils 64.6 % Final    % Lymphocytes 26.7 % Final    % Monocytes 6.1 % Final    % Eosinophils 2.0 % Final    % Basophils 0.4 % Final    % Immature Granulocytes 0.2 %  Final    Nucleated RBCs 0 0 /100 Final    Absolute Neutrophil 5.2 1.6 - 8.3 10e9/L Final    Absolute Lymphocytes 2.2 0.8 - 5.3 10e9/L Final    Absolute Monocytes 0.5 0.0 - 1.3 10e9/L Final    Absolute Eosinophils 0.2 0.0 - 0.7 10e9/L Final    Absolute Basophils 0.0 0.0 - 0.2 10e9/L Final    Abs Immature Granulocytes 0.0 0 - 0.4 10e9/L Final    Absolute Nucleated RBC 0.0  Final         2/6/2018  9:34 PM      Component Results     Component Value Ref Range & Units Status    Sodium 138 133 - 144 mmol/L Final    Potassium 3.6 3.4 - 5.3 mmol/L Final    Chloride 103 94 - 109 mmol/L Final    Carbon Dioxide 28 20 - 32 mmol/L Final    Anion Gap 7 3 - 14 mmol/L Final    Glucose 113 (H) 70 - 99 mg/dL Final    Urea Nitrogen 12 7 - 30 mg/dL Final    Creatinine 0.86 0.66 - 1.25 mg/dL Final    GFR Estimate >90 >60 mL/min/1.7m2 Final    Non  GFR Calc    GFR Estimate If Black >90 >60 mL/min/1.7m2 Final    African American GFR Calc    Calcium 8.4 (L) 8.5 - 10.1 mg/dL Final         2/6/2018  9:37 PM      Component Results     Component Value Ref Range & Units Status    Troponin I ES <0.015 0.000 - 0.045 ug/L Final    The 99th percentile for upper reference range is 0.045 ug/L.  Troponin values   in the range of 0.045 - 0.120 ug/L may be associated with risks of adverse   clinical events.           2/6/2018  9:42 PM      Component Results     Component Value Ref Range & Units Status    D Dimer <0.3 0.0 - 0.50 ug/ml FEU Final    This D-dimer assay is intended for use in conjunction with a clinical pretest   probability assessment model to exclude pulmonary embolism (PE) and deep   venous thrombosis (DVT) in outpatients suspected of PE or DVT. The cut-off   value is 0.5 ug/mL FEU.           2/6/2018  9:37 PM      Component Results     Component Value Ref Range & Units Status    Bilirubin Direct 0.1 0.0 - 0.2 mg/dL Final    Bilirubin Total 0.3 0.2 - 1.3 mg/dL Final    Albumin 3.7 3.4 - 5.0 g/dL Final    Protein Total 6.9  6.8 - 8.8 g/dL Final    Alkaline Phosphatase 115 40 - 150 U/L Final    ALT 86 (H) 0 - 70 U/L Final     (H) 0 - 45 U/L Final         2/6/2018  9:34 PM      Component Results     Component Value Ref Range & Units Status    Lipase 190 73 - 393 U/L Final         2/6/2018 10:05 PM      Narrative     CHEST TWO VIEW   2/6/2018 9:31 PM     HISTORY: Chest pain, pneumothorax, mass.     COMPARISON: None.    FINDINGS: Heart size normal. Lungs clear.        Impression     IMPRESSION: Negative.    ROSANA GODDARD MD         2/6/2018  9:09 PM      Impression     PROCEDURE: Limited Point of Care Bedside ED Ultrasound: Aorta  PERFORMED BY:  Dr.Michael Calderón  INDICATION: Abdominal Pain/Back Pain.  Evaluate AAA  PROBE: Low frequency convex probe  BODY LOCATION: Abdomen:    The ultrasound was performed using longitudinal and transverse views.  FINDINGS:  The Abdominal aorta is approximately:     1.8     cm.  There is no evidence of AAA.  INTERPRETATION: The evaluation of the aorta was normal, without evidence of aneurysm.  Aorta visualized entirety from insertion into the intra-abdominal cavity to bifurcation into iliac vessels.  There was no abdominal free fluid.  The views were mildly limited secondary to superimposed bowel gas and some stool.    IMAGE DOCUMENTATION: Images were archived to the PACS system.                  Clinical Quality Measure: Blood Pressure Screening     Your blood pressure was checked while you were in the emergency department today. The last reading we obtained was  BP: (!) 143/95 . Please read the guidelines below about what these numbers mean and what you should do about them.  If your systolic blood pressure (the top number) is less than 120 and your diastolic blood pressure (the bottom number) is less than 80, then your blood pressure is normal. There is nothing more that you need to do about it.  If your systolic blood pressure (the top number) is 120-139 or your diastolic blood pressure (the  "bottom number) is 80-89, your blood pressure may be higher than it should be. You should have your blood pressure rechecked within a year by a primary care provider.  If your systolic blood pressure (the top number) is 140 or greater or your diastolic blood pressure (the bottom number) is 90 or greater, you may have high blood pressure. High blood pressure is treatable, but if left untreated over time it can put you at risk for heart attack, stroke, or kidney failure. You should have your blood pressure rechecked by a primary care provider within the next 4 weeks.  If your provider in the emergency department today gave you specific instructions to follow-up with your doctor or provider even sooner than that, you should follow that instruction and not wait for up to 4 weeks for your follow-up visit.        Thank you for choosing [facility]       Thank you for choosing [facility] for your care. Our goal is always to provide you with excellent care. Hearing back from our patients is one way we can continue to improve our services. Please take a few minutes to complete the written survey that you may receive in the mail after you visit with us. Thank you!        [employer] Information     [employer] lets you send messages to your doctor, view your test results, renew your prescriptions, schedule appointments and more. To sign up, go to [email or web address] . Click on \"Log in\" on the left side of the screen, which will take you to the Welcome page. Then click on \"Sign up Now\" on the right side of the page.     You will be asked to enter the access code listed below, as well as some personal information. Please follow the directions to create your username and password.     Your access code is: [email or web address]  Expires: 2018 10:36 PM     Your access code will  in 90 days. If you need help or a new code, please call your [facility] clinic or [phone].        Care EveryWhere ID     This is your Care EveryWhere ID. This " could be used by other organizations to access your [facility] medical records  [id number]        Equal Access to Services     PAT BOLES : Hadii howie Caballero, taryn barragan, radha carreon. So [facility] [phone].    ATENCIÓN: Si habla español, tiene a de leon disposición servicios gratuitos de asistencia lingüística. Llame al [phone].    We comply with applicable federal civil rights laws and [place] laws. We do not discriminate on the basis of race, color, national origin, age, disability, sex, sexual orientation, or gender identity.            After Visit Summary       This is your record. Keep this with you and show to your community pharmacist(s) and doctor(s) at your next visit.

## 2018-02-06 NOTE — ED AVS SNAPSHOT
Emergency Department    [address] [place] [address]    Phone:  [phone]    Fax:  [phone]                                       Ricki Acuna   MRN: [id number]    Department:   Emergency Department   Date of Visit:  2/6/2018           After Visit Summary Signature Page     I have received my discharge instructions, and my questions have been answered. I have discussed any challenges I see with this plan with the nurse or doctor.    ..........................................................................................................................................  Patient/Patient Representative Signature      ..........................................................................................................................................  Patient Representative Print Name and Relationship to Patient    ..................................................               ................................................  Date                                            Time    ..........................................................................................................................................  Reviewed by Signature/Title    ...................................................              ..............................................  Date                                                            Time

## 2018-02-07 LAB — INTERPRETATION ECG - MUSE: NORMAL

## 2018-02-07 NOTE — ED NOTES
"Patient presents with family, patient states he has had intermittent \"comes in waves\" sharp/stabbing chest pain on both sides x 30 min.  Patient states it lasts for approximately 1 min and goes away.    "

## 2018-02-07 NOTE — ED PROVIDER NOTES
"  History     Chief Complaint:  Chest pain     HPI   Ricki Acuna is a 57 year old male s/p cholecystectomy with a history significant for tobacco use disorder, skin cancer, hypertension and hyperlipidemia who presents to the emergency department for evaluation of chest pain. The patient reports chest pain for the past hour. The patient states that he was sitting in his chair, and the pain began in his \"whole chest area.\" His wife states that the patient was complaining that his chest was aching bilaterally; the patient describes the pain as \"tightness.\" Now, the pain is intermittent and \"only light\" in his left chest and radiating down to his abdomen, into the epigastrium. When the pain crescendos, it is \"squeezing\" bilaterally, causing him some breathing problems as well but not aggravated through breathing. He endorses shortness of breath and nausea, alongside his chest tightness. He denies leg swelling and hemoptysis.     Cardiac/PE/DVT Risk Factors:  History of hypertension - positive   History of hyperlipidemia - positive   History of diabetes - negative   History of smoking - positive   Personal history of PE/DVT - negative   Family history of PE/DVT - negative   Family history of heart complications - negative   Recent travel - negative     Allergies:  No Known Drug Allergies     Medications:    Aspirin    Lisinopril    Past Medical History:    Adenomatous colon polyp  Hypertension  Skin cancer  Hyperlipidemia   Tobacco use disorder     Past Surgical History:    Cholecystectomy  Colonoscopy x2  Hernia repair     Family History:    Prostate cancer   Diabetes  Stroke    Social History:  The patient was accompanied to the ED by his wife and son.  Smoking Status: Current every day smoker, 0.5 PPD  Smokeless Tobacco: Never  Alcohol Use: Positive   Marital Status:        Review of Systems   Respiratory: Positive for chest tightness and shortness of breath.    Cardiovascular: Positive for chest pain. " "  Gastrointestinal: Positive for abdominal pain and nausea.     Physical Exam   Patient Vitals for the past 24 hrs:   BP Temp Temp src Pulse Resp SpO2 Height Weight   02/06/18 2145 132/88 - - - - 95 % - -   02/06/18 2130 116/81 - - - - 97 % - -   02/06/18 2115 (!) 133/92 - - - - - - -   02/06/18 2100 150/89 - - - - 95 % - -   02/06/18 2044 - - - - - 97 % - -   02/06/18 2043 (!) 165/106 - - - - - - -   02/06/18 2024 (!) 172/96 98.7  F (37.1  C) Oral 90 18 99 % 1.86 m (6' 1.23\") 108.6 kg (239 lb 6.7 oz)      Physical Exam  General: Resting comfortably on the gurney  Head:  The scalp, face, and head appear normal  Eyes:  The pupils are equal, round, and reactive to light    There is no nystagmus    Extraocular muscles are intact    Conjunctivae and sclerae are normal  ENT:    The nose is normal    Pinnae are normal    The oropharynx is normal    Uvula is in the midline  Neck:  Normal range of motion    There is no rigidity noted    There is no midline cervical spine pain/tenderness    Trachea is in the midline    No mass is detected  CV:  Regular rate and underlying rhythm     Normal S1/S2, no S3/S4    No pathological murmur detected  Resp:  Lungs are clear    There is no tachypnea    Non-labored    No rales    No wheezing   GI:  Abdomen is soft, there is no rigidity    No distension    No tympani    Mild epigastric tenderness    Well healed open cholecystectomy scar    Non-surgical without peritoneal features  MS:  Normal muscular tone    Symmetric motor strength    No major joint effusions    No asymmetric leg swelling, no calf tenderness  Skin:  No rash or acute skin lesions noted  Neuro: Speech is normal and fluent  Psych:  Awake. Alert.      Normal affect.  Appropriate interactions.  Lymph: No anterior cervical lymphadenopathy noted    Emergency Department Course     ECG:  Indication: Chest pain   Completed at 2025.  Read at 2045.   Normal sinus rhythm   Normal ECG   Rate 84 bpm. CO interval 180. QRS duration 90. " QT/QTc 354/418. P-R-T axes 31 21 47.    Imaging:  Radiology findings were communicated with the patient and family who voiced understanding of the findings.    XR Chest 2 views:  IMPRESSION: Negative.  Report per radiology     POC US Abdomen Limited:  Impression:      PROCEDURE: Limited Point of Care Bedside ED Ultrasound: Aorta  PERFORMED BY:  Dr.Michael Calderón  INDICATION: Abdominal Pain/Back Pain.  Evaluate AAA  PROBE: Low frequency convex probe  BODY LOCATION: Abdomen:    The ultrasound was performed using longitudinal and transverse views.  FINDINGS:  The Abdominal aorta is approximately:     1.8     cm.  There is no evidence of AAA.  INTERPRETATION: The evaluation of the aorta was normal, without evidence of aneurysm.  Aorta visualized entirety from insertion into the intra-abdominal cavity to bifurcation into iliac vessels.  There was no abdominal free fluid.  The views were mildly limited secondary to superimposed bowel gas and some stool.    IMAGE DOCUMENTATION: Images were archived to the PACS system.         Laboratory:  Laboratory findings were communicated with the patient and family who voiced understanding of the findings.    CBC: AWNL. (WBC 8.1, HGB 14.6, )   BMP: Glucose 113 (H), Calcium 8.4 (L), o/w WNL (Creatinine 0.86)    Troponin (Collected 2110): <0.015  D Dimer (Collected 2110): <0.3    Lipase: 190   Hepatic panel: ALT 86 (H),  (H) o/w WNL      Emergency Department Course:  Nursing notes and vitals reviewed.  IV was inserted and blood was drawn for laboratory testing, results above.  The patient was sent for a XR Chest 2 views, POC US Abdomen Limited while in the emergency department, results above.   EKG obtained in the ED, see results above.     2045: I performed an exam of the patient as documented above.   2056: I performed a bedside ultrasound of the patient's abdomen.  2227: Patient rechecked and updated.     Findings and plan explained to the Patient and spouse and son.  Patient discharged home with instructions regarding supportive care, medications, and reasons to return. The importance of close follow-up was reviewed.   I personally reviewed the laboratory and imaging results with the Patient and spouse and son and answered all related questions prior to discharge.     Impression & Plan      Medical Decision Making:  This patient presents with an episode of chest discomfort and abdominal pain that felt like a colicky episode.  His gallbladder has been removed.  The patient does he use alcohol.  On today's laboratory assessment the patient's liver function tests are mildly elevated.  They have not been this way in the past.  There is no evidence of biliary obstruction, his alkaline phosphatase and bilirubin are normal.  He is not on a statin drug.  He had screening laboratories to look at his heart and his troponin and d-dimer are negative.  His chest x-ray is negative.  I also did an abdominal ultrasound to look for abdominal aortic aneurysm and I did not see any evidence of that.  The patient has been asymptomatic since arrival.  The exact etiology of the pain is unclear.  He did have some tympany and excess gas in the upper abdomen on percussion the possibility of choledocho lithiasis is there as well as the pain did feel similar to the past gallbladder pain.  Since the patient is feeling better at this time he will be discharged he can follow-up with Dr. Fragoso for follow-up and repeat LFTs      Critical Care time:  none    Diagnosis:    ICD-10-CM    1. Abdominal pain, epigastric R10.13    2. Elevated LFTs R79.89        Disposition:  Discharged to home.    Scribe Disclosure:  ARMANI, Rosibel Mahmood, am serving as a scribe at 8:40 PM on 2/6/2018 to document services personally performed by Jose Calderón MD based on my observations and the provider's statements to me.   2/6/2018    EMERGENCY DEPARTMENT       Jose Calderón MD  02/06/18 [id number]

## 2018-02-07 NOTE — DISCHARGE INSTRUCTIONS
Epigastric Pain (Uncertain Cause)     Epigastric pain can be a sign of disease in the upper abdomen. Common causes include:    Acid reflux (stomach acid flowing up into the esophagus)    Gastritis (irritation of the stomach lining)    Peptic Ulcer Disease    Inflammation of the pancreas    Gallstone    Infection in the gallbladder  Pain may be dull or burning. It may spread upward to the chest or to the back. There may be other symptoms such as belching, bloating, cramps or hunger pains. There may be weight loss or poor appetite, nausea or vomiting.  Since the diagnosis of your pain is not certain yet, further tests may sometimes be needed. Sometimes the doctor will treat you for the most likely condition to see if there is improvement before doing further tests.  Home care  Medicines    Antacids help neutralize the normal acids in your stomach. Examples are Maalox, Mylanta, Rolaids, and Tums. If you don t like the liquid, you can also try a chewable one. You may find one works better than another for you. Overuse can cause diarrhea or constipation.    Acid blockers (H2 blockers) decrease acid production. Examples are cimetidine (Tagamet), famotidine (Pepcid) and ranitidine (Zantac).    Acid inhibitors (PPIs) decrease acid production in a different way than the blockers. You may find they work better, but can take a little longer to take effect.  Examples are omeprazole (Prilosec), lansoprazole (Prevacid), pantoprazole (Protonix), rabeprazole (Aciphex), and esomeprazole (Nexium).    Take an antacid 30-60 minutes after eating and at bedtime, but not at the same time as an acid blocker.    Try not to take NSAIDs. Aspirin may also cause problems, but if taking it for your heart or other medical reasons, talk to your doctor before stopping it; you do not want to cause a worse problem, like a heart attack or stroke.  Diet    If certain foods seem to cause your spasm, try to avoid them.     Eat slowly and chew food well  before swallowing. Symptoms of gastritis can be worsened by certain foods. Limit or avoid fatty, fried, and spicy foods, as well as coffee, chocolate, mint, and foods with high acid content such as tomatoes and citrus fruit and juices (orange, grapefruit, lemon).    Avoid alcohol, caffeine, and tobacco, which can delay healing and worsen your problem.    Try eating smaller meals with snacks in between  Follow-up care  Follow up with your healthcare provider or as advised.  When to seek medical advice  Call your healthcare provider right away if any of the following occur:    Stomach pain worsens or moves to the right lower part of the abdomen    Chest pain appears, or if it worsens or spreads to the chest, back, neck, shoulder, or arm    Frequent vomiting (can t keep down liquids)    Blood in the stool or vomit (red or black color)    Feeling weak or dizzy, fainting, or having trouble breathing    Fever of 100.4 F (38 C) or higher, or as directed by your healthcare provider    Abdominal swelling  Date Last Reviewed: 9/25/2015 2000-2017 [employer]. [address] [place] [address]. All rights reserved. This information is not intended as a substitute for professional medical care. Always follow your healthcare professional's instructions.      Discharge Instructions  Abdominal Pain    Abdominal pain (belly pain) can be caused by many things. Your evaluation today does not show the exact cause for your pain. Your provider today has decided that it is unlikely your pain is due to a life threatening problem, or a problem requiring surgery or hospital admission. Sometimes those problems cannot be found right away, so it is very important that you follow up as directed.  Sometimes only the changes which occur over time allow the cause of your pain to be found.    Generally, every Emergency Department visit should have a follow-up clinic visit with either a primary or a specialty clinic/provider.  Please follow-up as instructed by your emergency provider today. With abdominal pain, we often recommend very close follow-up, such as the following day.    ADULTS:  Return to the Emergency Department right away if:      You get an oral temperature above 102oF or as directed by your provider.    You have blood in your stools. This may be bright red or appear as black, tarry stools.      You keep vomiting (throwing up) or cannot drink liquids.    You see blood when you vomit.     You cannot have a bowel movement or you cannot pass gas.    Your stomach gets bloated or bigger.    Your skin or the whites of your eyes look yellow.    You faint.    You have bloody, frequent or painful urination (peeing).    You have new symptoms or anything that worries you.    CHILDREN:  Return to the Emergency Department right away if your child has any of the above-listed symptoms or the following:      Pushes your hand away or screams/cries when his/her belly is touched.    You notice your child is very fussy or weak.    Your child is very tired and is too tired to eat or drink.    Your child is dehydrated.  Signs of dehydration can be:  o Significant change in the amount of wet diapers/urine.  o Your infant or child starts to have dry mouth and lips, or no saliva (spit) or tears.    PREGNANT WOMEN:  Return to the Emergency Department right away if you have any of the above-listed symptoms or the following:      You have bleeding, leaking fluid or passing tissue from the vagina.    You have worse pain or cramping, or pain in your shoulder or back.    You have vomiting that will not stop.    You have a temperature of 100oF or more.    Your baby is not moving as much as usual.    You faint.    You get a bad headache with or without eye problems and abdominal pain.    You have a seizure.    You have unusual discharge from your vagina and abdominal pain.    Abdominal pain is pretty common during pregnancy.  Your pain may or may not be  "related to your pregnancy. You should follow-up closely with your OB provider so they can evaluate you and your baby.  Until you follow-up with your regular provider, do the following:       Avoid sex and do not put anything in your vagina.    Drink clear fluids.    Only take medications approved by your provider.    MORE INFORMATION:    Appendicitis:  A possible cause of abdominal pain in any person who still has their appendix is acute appendicitis. Appendicitis is often hard to diagnose.  Testing does not always rule out early appendicitis or other causes of abdominal pain. Close follow-up with your provider and re-evaluations may be needed to figure out the reason for your abdominal pain.    Follow-up:  It is very important that you make an appointment with your clinic and go to the appointment.  If you do not follow-up with your primary provider, it may result in missing an important development which could result in permanent injury or disability and/or lasting pain.  If there is any problem keeping your appointment, call your provider or return to the Emergency Department.    Medications:  Take your medications as directed by your provider today.  Before using over-the-counter medications, ask your provider and make sure to take the medications as directed.  If you have any questions about medications, ask your provider.    Diet:  Resume your normal diet as much as possible, but do not eat fried, fatty or spicy foods while you have pain.  Do not drink alcohol or have caffeine.  Do not smoke tobacco.    Probiotics: If you have been given an antibiotic, you may want to also take a probiotic pill or eat yogurt with live cultures. Probiotics have \"good bacteria\" to help your intestines stay healthy. Studies have shown that probiotics help prevent diarrhea (loose stools) and other intestine problems (including C. diff infection) when you take antibiotics. You can buy these without a prescription in the pharmacy " section of the store.     If you were given a prescription for medicine here today, be sure to read all of the information (including the package insert) that comes with your prescription.  This will include important information about the medicine, its side effects, and any warnings that you need to know about.  The pharmacist who fills the prescription can provide more information and answer questions you may have about the medicine.  If you have questions or concerns that the pharmacist cannot address, please call or return to the Emergency Department.       Remember that you can always come back to the Emergency Department if you are not able to see your regular provider in the amount of time listed above, if you get any new symptoms, or if there is anything that worries you.        These have your liver test repeated in 1-2 weeks with Dr. Fragoso

## 2018-05-21 ENCOUNTER — HEALTH MAINTENANCE LETTER (OUTPATIENT)
Age: 58
End: 2018-05-21

## 2018-08-09 NOTE — PATIENT INSTRUCTIONS
Please call [facility] at [phone] to schedule your lung cancer screening CT.        Preventive Health Recommendations  Male Ages 50 - 64    Yearly exam:             See your health care provider every year in order to  o   Review health changes.   o   Discuss preventive care.    o   Review your medicines if your doctor has prescribed any.     Have a cholesterol test every 5 years, or more frequently if you are at risk for high cholesterol/heart disease.     Have a diabetes test (fasting glucose) every three years. If you are at risk for diabetes, you should have this test more often.     Have a colonoscopy at age 50, or have a yearly FIT test (stool test). These exams will check for colon cancer.      Talk with your health care provider about whether or not a prostate cancer screening test (PSA) is right for you.    You should be tested each year for STDs (sexually transmitted diseases), if you re at risk.     Shots: Get a flu shot each year. Get a tetanus shot every 10 years.     Nutrition:    Eat at least 5 servings of fruits and vegetables daily.     Eat whole-grain bread, whole-wheat pasta and brown rice instead of white grains and rice.     Get adequate Calcium and Vitamin D.     Lifestyle    Exercise for at least 150 minutes a week (30 minutes a day, 5 days a week). This will help you control your weight and prevent disease.     Limit alcohol to one drink per day.     No smoking.     Wear sunscreen to prevent skin cancer.     See your dentist every six months for an exam and cleaning.     See your eye doctor every 1 to 2 years.

## 2018-08-09 NOTE — PROGRESS NOTES
SUBJECTIVE:   CC: Ricki Acuna is an 57 year old male who presents for preventative health visit.     Healthy Habits:    Do you get at least three servings of calcium containing foods daily (dairy, green leafy vegetables, etc.)? yes    Amount of exercise or daily activities, outside of work: 7 day(s) per week    Problems taking medications regularly No    Medication side effects: No    Have you had an eye exam in the past two years? no    Do you see a dentist twice per year? yes  Do you have sleep apnea, excessive snoring or daytime drowsiness?no    Follow up ER visit 2-6-18             Today's PHQ-2 Score:   PHQ-2 ( 1999 Pfizer) 8/15/2018 8/10/2017   Q1: Little interest or pleasure in doing things 0 0   Q2: Feeling down, depressed or hopeless 0 0   PHQ-2 Score 0 0       Abuse: Current or Past(Physical, Sexual or Emotional)- No  Do you feel safe in your environment - Yes    Social History   Substance Use Topics     Smoking status: Current Every Day Smoker     Packs/day: 0.50     Types: Cigarettes     Smokeless tobacco: Never Used     Alcohol use 4.2 oz/week     0 Standard drinks or equivalent, 7 Glasses of wine per week      Comment: 1/day, wine      If you drink alcohol do you typically have >3 drinks per day or >7 drinks per week? No                      Last PSA:   PSA   Date Value Ref Range Status   08/11/2017 1.22 0 - 4 ug/L Final     Comment:     Assay Method:  Chemiluminescence using Siemens Vista analyzer       Reviewed orders with patient. Reviewed health maintenance and updated orders accordingly - Yes  Patient Active Problem List   Diagnosis     Benign essential hypertension     Adenomatous colon polyp     Hyperlipidemia LDL goal <160     Tobacco use disorder     Past Surgical History:   Procedure Laterality Date     CHOLECYSTECTOMY      open     COLONOSCOPY  3/20/15    hx polyps     COLONOSCOPY N/A 3/20/2015    Procedure: COMBINED COLONOSCOPY, SINGLE OR MULTIPLE BIOPSY/POLYPECTOMY BY BIOPSY;   "Surgeon: Chun Joe MD;  Location:  GI     HERNIA REPAIR      inguinal hernia repair       Social History   Substance Use Topics     Smoking status: Current Every Day Smoker     Packs/day: 0.50     Types: Cigarettes     Smokeless tobacco: Never Used     Alcohol use 4.2 oz/week     0 Standard drinks or equivalent, 7 Glasses of wine per week      Comment: 1/day, wine     Family History   Problem Relation Age of Onset     Prostate Cancer Father      Cerebrovascular Disease Mother      Diabetes Mother          Current Outpatient Prescriptions   Medication Sig Dispense Refill     aspirin 81 MG tablet Take by mouth daily 30 tablet      lisinopril-hydrochlorothiazide (PRINZIDE/ZESTORETIC) 20-12.5 MG per tablet Take 1 tablet by mouth daily 90 tablet 3     No Known Allergies    Reviewed and updated as needed this visit by clinical staff  Tobacco  Allergies  Meds  Med Hx  Surg Hx  Fam Hx  Soc Hx        Reviewed and updated as needed this visit by Provider  Tobacco  Med Hx  Surg Hx  Fam Hx  Soc Hx       Past Medical History:   Diagnosis Date     Adenomatous colon polyp 3-20-15    repeat in 3 years      Hypertension      Skin cancer     Dr. Antoine Aguirre MD DERMATOLOGY SPECIALIST       Past Surgical History:   Procedure Laterality Date     CHOLECYSTECTOMY      open     COLONOSCOPY  3/20/15    hx polyps     COLONOSCOPY N/A 3/20/2015    Procedure: COMBINED COLONOSCOPY, SINGLE OR MULTIPLE BIOPSY/POLYPECTOMY BY BIOPSY;  Surgeon: Chun Joe MD;  Location:  GI     HERNIA REPAIR      inguinal hernia repair       ROS:  A 10 organ systems ROS is negative.     OBJECTIVE:   /88 (BP Location: Right arm, Patient Position: Sitting, Cuff Size: Adult Large)  Pulse 83  Temp 98.3  F (36.8  C) (Oral)  Resp 16  Ht 6' 1.25\" (1.861 m)  Wt 230 lb (104.3 kg)  SpO2 97%  BMI 30.14 kg/m2  EXAM:  GENERAL: healthy, alert and no distress  EYES: Eyes grossly normal to inspection, PERRL and conjunctivae and sclerae " normal  HENT: ear canals and TM's normal, nose and mouth without ulcers or lesions  NECK: no adenopathy, no asymmetry, masses, or scars and thyroid normal to palpation  RESP: lungs clear to auscultation - no rales, rhonchi or wheezes  CV: regular rate and rhythm, normal S1 S2, no S3 or S4, no murmur, click or rub, no peripheral edema and peripheral pulses strong  ABDOMEN: soft, nontender, no hepatosplenomegaly, no masses and bowel sounds normal  RECTAL: normal sphincter tone, no rectal masses, prostate normal size, smooth, nontender without nodules or masses  MS: no gross musculoskeletal defects noted, no edema  SKIN: no suspicious lesions or rashes  NEURO: Normal strength and tone, mentation intact and speech normal  PSYCH: mentation appears normal, affect normal/bright    Diagnostic Test Results:  Labs pending     ASSESSMENT/PLAN:   1. Routine general medical examination at a health care facility      2. Hyperlipidemia LDL goal <160    - Lipid panel reflex to direct LDL Fasting    3. Benign essential hypertension  OK control    4. Tobacco use disorder  Discussed tobacco cessation strategies     5. Elevated liver enzymes  Recheck enzyme levels; if still high, consider further evaluation   - Comprehensive metabolic panel  - CBC with platelets  - JUST IN CASE    6. Screen for colon cancer  Reminded to schedule follow up colonoscopy   - GASTROENTEROLOGY ADULT REF PROCEDURE ONLY Other; [facility] [phone]    7. Screening for HIV (human immunodeficiency virus)    - HIV Screening    8. Need for prophylactic vaccination with tetanus-diphtheria (TD)  Due for tetanus booster today     9. Prostate cancer screening    - Prostate spec antigen screen    10. Encounter for screening for lung cancer  Candidate for lung cancer screening CT  He wishes to schedule after discussion of risks and benefits  - CT Chest Low Dose Non Contrast; Future    COUNSELING:  Reviewed preventive health counseling, as reflected in patient  "instructions  Special attention given to:        Regular exercise       Healthy diet/nutrition       Immunizations    Vaccinated for: Td             HIV screeninx in teen years, 1x in adult years, and at intervals if high risk       Colon cancer screening; due for follow up colonoscopy; reminded; he will go to [facility] this        Prostate cancer screening    BP Readings from Last 1 Encounters:   08/15/18 130/88     Estimated body mass index is 30.14 kg/(m^2) as calculated from the following:    Height as of this encounter: 6' 1.25\" (1.861 m).    Weight as of this encounter: 230 lb (104.3 kg).      Weight management plan: Discussed healthy diet and exercise guidelines and patient will follow up in 12 months in clinic to re-evaluate.     reports that he has been smoking Cigarettes.  He has been smoking about 0.50 packs per day. He has never used smokeless tobacco.      Counseling Resources:  ATP IV Guidelines  Pooled Cohorts Equation Calculator  FRAX Risk Assessment  ICSI Preventive Guidelines  Dietary Guidelines for Americans, 2010  USDA's MyPlate  ASA Prophylaxis  Lung CA Screening    Bryan Fragoso MD  [facility]  "

## 2018-08-15 ENCOUNTER — OFFICE VISIT (OUTPATIENT)
Dept: FAMILY MEDICINE | Facility: CLINIC | Age: 58
End: 2018-08-15
Payer: COMMERCIAL

## 2018-08-15 VITALS
SYSTOLIC BLOOD PRESSURE: 130 MMHG | WEIGHT: 230 LBS | BODY MASS INDEX: 30.48 KG/M2 | TEMPERATURE: 98.3 F | HEART RATE: 83 BPM | RESPIRATION RATE: 16 BRPM | DIASTOLIC BLOOD PRESSURE: 88 MMHG | OXYGEN SATURATION: 97 % | HEIGHT: 73 IN

## 2018-08-15 DIAGNOSIS — F17.200 TOBACCO USE DISORDER: ICD-10-CM

## 2018-08-15 DIAGNOSIS — I10 BENIGN ESSENTIAL HYPERTENSION: ICD-10-CM

## 2018-08-15 DIAGNOSIS — Z12.11 SCREEN FOR COLON CANCER: ICD-10-CM

## 2018-08-15 DIAGNOSIS — Z23 NEED FOR PROPHYLACTIC VACCINATION WITH TETANUS-DIPHTHERIA (TD): ICD-10-CM

## 2018-08-15 DIAGNOSIS — Z00.00 ROUTINE GENERAL MEDICAL EXAMINATION AT A HEALTH CARE FACILITY: Primary | ICD-10-CM

## 2018-08-15 DIAGNOSIS — E78.5 HYPERLIPIDEMIA LDL GOAL <160: ICD-10-CM

## 2018-08-15 DIAGNOSIS — Z12.2 ENCOUNTER FOR SCREENING FOR LUNG CANCER: ICD-10-CM

## 2018-08-15 DIAGNOSIS — Z12.5 PROSTATE CANCER SCREENING: ICD-10-CM

## 2018-08-15 DIAGNOSIS — R74.8 ELEVATED LIVER ENZYMES: ICD-10-CM

## 2018-08-15 DIAGNOSIS — Z11.4 SCREENING FOR HIV (HUMAN IMMUNODEFICIENCY VIRUS): ICD-10-CM

## 2018-08-15 LAB
ALBUMIN SERPL-MCNC: 4 G/DL (ref 3.4–5)
ALP SERPL-CCNC: 110 U/L (ref 40–150)
ALT SERPL W P-5'-P-CCNC: 37 U/L (ref 0–70)
ANION GAP SERPL CALCULATED.3IONS-SCNC: 8 MMOL/L (ref 3–14)
AST SERPL W P-5'-P-CCNC: 15 U/L (ref 0–45)
BILIRUB SERPL-MCNC: 0.6 MG/DL (ref 0.2–1.3)
BUN SERPL-MCNC: 13 MG/DL (ref 7–30)
CALCIUM SERPL-MCNC: 8.8 MG/DL (ref 8.5–10.1)
CHLORIDE SERPL-SCNC: 104 MMOL/L (ref 94–109)
CHOLEST SERPL-MCNC: 182 MG/DL
CO2 SERPL-SCNC: 27 MMOL/L (ref 20–32)
CREAT SERPL-MCNC: 0.82 MG/DL (ref 0.66–1.25)
ERYTHROCYTE [DISTWIDTH] IN BLOOD BY AUTOMATED COUNT: 13.5 % (ref 10–15)
GFR SERPL CREATININE-BSD FRML MDRD: >90 ML/MIN/1.7M2
GLUCOSE SERPL-MCNC: 93 MG/DL (ref 70–99)
HCT VFR BLD AUTO: 43.1 % (ref 40–53)
HDLC SERPL-MCNC: 32 MG/DL
HGB BLD-MCNC: 14.3 G/DL (ref 13.3–17.7)
HIV 1+2 AB+HIV1 P24 AG SERPL QL IA: NONREACTIVE
LDLC SERPL CALC-MCNC: 119 MG/DL
MCH RBC QN AUTO: 30.5 PG (ref 26.5–33)
MCHC RBC AUTO-ENTMCNC: 33.2 G/DL (ref 31.5–36.5)
MCV RBC AUTO: 92 FL (ref 78–100)
NONHDLC SERPL-MCNC: 150 MG/DL
PLATELET # BLD AUTO: 290 10E9/L (ref 150–450)
POTASSIUM SERPL-SCNC: 3.7 MMOL/L (ref 3.4–5.3)
PROT SERPL-MCNC: 7.1 G/DL (ref 6.8–8.8)
PSA SERPL-ACNC: 1.23 UG/L (ref 0–4)
RBC # BLD AUTO: 4.69 10E12/L (ref 4.4–5.9)
SODIUM SERPL-SCNC: 139 MMOL/L (ref 133–144)
TRIGL SERPL-MCNC: 154 MG/DL
WBC # BLD AUTO: 8.9 10E9/L (ref 4–11)

## 2018-08-15 PROCEDURE — 90714 TD VACC NO PRESV 7 YRS+ IM: CPT | Performed by: INTERNAL MEDICINE

## 2018-08-15 PROCEDURE — 36415 COLL VENOUS BLD VENIPUNCTURE: CPT | Performed by: INTERNAL MEDICINE

## 2018-08-15 PROCEDURE — 99396 PREV VISIT EST AGE 40-64: CPT | Mod: 25 | Performed by: INTERNAL MEDICINE

## 2018-08-15 PROCEDURE — 87389 HIV-1 AG W/HIV-1&-2 AB AG IA: CPT | Performed by: INTERNAL MEDICINE

## 2018-08-15 PROCEDURE — 80061 LIPID PANEL: CPT | Performed by: INTERNAL MEDICINE

## 2018-08-15 PROCEDURE — 85027 COMPLETE CBC AUTOMATED: CPT | Performed by: INTERNAL MEDICINE

## 2018-08-15 PROCEDURE — 90471 IMMUNIZATION ADMIN: CPT | Performed by: INTERNAL MEDICINE

## 2018-08-15 PROCEDURE — G0103 PSA SCREENING: HCPCS | Performed by: INTERNAL MEDICINE

## 2018-08-15 PROCEDURE — 80053 COMPREHEN METABOLIC PANEL: CPT | Performed by: INTERNAL MEDICINE

## 2018-08-15 NOTE — NURSING NOTE
"Chief Complaint   Patient presents with     Physical     not fasting       Initial /88 (BP Location: Right arm, Patient Position: Sitting, Cuff Size: Adult Large)  Pulse 83  Temp 98.3  F (36.8  C) (Oral)  Resp 16  Ht 6' 1.25\" (1.861 m)  Wt 230 lb (104.3 kg)  SpO2 97%  BMI 30.14 kg/m2 Estimated body mass index is 30.14 kg/(m^2) as calculated from the following:    Height as of this encounter: 6' 1.25\" (1.861 m).    Weight as of this encounter: 230 lb (104.3 kg).  BP completed using cuff size: large    Health Maintenance that is potentially due pending provider review:  Health Maintenance Due   Topic Date Due     HIV SCREEN (SYSTEM ASSIGNED)  11/29/1980     ADVANCE DIRECTIVE PLANNING Q5 YRS  11/29/2017     TETANUS IMMUNIZATION (SYSTEM ASSIGNED)  01/01/2018     COLONOSCOPY Q3 YR  03/20/2018     PHQ-2 Q1 YR  08/10/2018         Per provider  "

## 2018-08-15 NOTE — MR AVS SNAPSHOT
After Visit Summary   8/15/2018    Ricki Acuna    MRN: [id number]           Patient Information     Date Of Birth          1960        Visit Information        Provider Department      8/15/2018 10:00 AM Bryan Fragoso MD [facility]        Today's Diagnoses     Routine general medical examination at a health care facility    -  1    Hyperlipidemia LDL goal <160        Benign essential hypertension        Tobacco use disorder        Elevated liver enzymes        Screen for colon cancer        Screening for HIV (human immunodeficiency virus)        Need for prophylactic vaccination with tetanus-diphtheria (TD)        Prostate cancer screening        Encounter for screening for lung cancer          Care Instructions    Please call [facility] at [phone] to schedule your lung cancer screening CT.        Preventive Health Recommendations  Male Ages 50 - 64    Yearly exam:             See your health care provider every year in order to  o   Review health changes.   o   Discuss preventive care.    o   Review your medicines if your doctor has prescribed any.     Have a cholesterol test every 5 years, or more frequently if you are at risk for high cholesterol/heart disease.     Have a diabetes test (fasting glucose) every three years. If you are at risk for diabetes, you should have this test more often.     Have a colonoscopy at age 50, or have a yearly FIT test (stool test). These exams will check for colon cancer.      Talk with your health care provider about whether or not a prostate cancer screening test (PSA) is right for you.    You should be tested each year for STDs (sexually transmitted diseases), if you re at risk.     Shots: Get a flu shot each year. Get a tetanus shot every 10 years.     Nutrition:    Eat at least 5 servings of fruits and vegetables daily.     Eat whole-grain bread, whole-wheat pasta and brown rice instead of white grains and rice.     Get  "adequate Calcium and Vitamin D.     Lifestyle    Exercise for at least 150 minutes a week (30 minutes a day, 5 days a week). This will help you control your weight and prevent disease.     Limit alcohol to one drink per day.     No smoking.     Wear sunscreen to prevent skin cancer.     See your dentist every six months for an exam and cleaning.     See your eye doctor every 1 to 2 years.            Follow-ups after your visit        Additional Services     GASTROENTEROLOGY ADULT REF PROCEDURE ONLY Other; [facility] [phone]                 Follow-up notes from your care team     Return in about 1 year (around 8/15/2019) for Physical Exam.      Future tests that were ordered for you today     Open Future Orders        Priority Expected Expires Ordered    CT Chest Low Dose Non Contrast Routine  8/15/2019 8/15/2018            Who to contact     If you have questions or need follow up information about today's clinic visit or your schedule please contact [facility] directly at [phone].  Normal or non-critical lab and imaging results will be communicated to you by MyChart, letter or phone within 4 business days after the clinic has received the results. If you do not hear from us within 7 days, please contact the clinic through [employer] or phone. If you have a critical or abnormal lab result, we will notify you by phone as soon as possible.  Submit refill requests through [employer] or call your pharmacy and they will forward the refill request to us. Please allow 3 business days for your refill to be completed.          Additional Information About Your Visit        Care EveryWhere ID     This is your Care EveryWhere ID. This could be used by other organizations to access your [facility] medical records  [id number]        Your Vitals Were     Pulse Temperature Respirations Height Pulse Oximetry BMI (Body Mass Index)    83 98.3  F (36.8  C) (Oral) 16 6' 1.25\" (1.861 m) 97% 30.14 kg/m2       Blood Pressure " from Last 3 Encounters:   08/15/18 130/88   02/06/18 (!) 143/95   08/10/17 137/84    Weight from Last 3 Encounters:   08/15/18 230 lb (104.3 kg)   02/06/18 239 lb 6.7 oz (108.6 kg)   08/10/17 238 lb (108 kg)              We Performed the Following     CBC with platelets     Comprehensive metabolic panel     GASTROENTEROLOGY ADULT REF PROCEDURE ONLY Other; [facility] [phone]     HIV Screening     JUST IN CASE     Lipid panel reflex to direct LDL Fasting     Prostate spec antigen screen        Primary Care Provider Office Phone # Fax #    Bryan TYREE Fargoso [phone] [address]  [place] [address]        Equal Access to Services     MAYI BOLES : Hadii aad ku hadasho Sojenniferali, waaxda luqadaha, qaybta kaalmada adeegyada, waxay alex valles . So [facility] [phone].    ATENCIÓN: Si habla español, tiene a de leon disposición servicios gratuitos de asistencia lingüística. Ll[facility] [phone].    We comply with applicable federal civil rights laws and [place] laws. We do not discriminate on the basis of race, color, national origin, age, disability, sex, sexual orientation, or gender identity.            Thank you!     Thank you for choosing [facility]  for your care. Our goal is always to provide you with excellent care. Hearing back from our patients is one way we can continue to improve our services. Please take a few minutes to complete the written survey that you may receive in the mail after your visit with us. Thank you!             Your Updated Medication List - Protect others around you: Learn how to safely use, store and throw away your medicines at www.disposemymeds.org.          This list is accurate as of 8/15/18 10:40 AM.  Always use your most recent med list.                   Brand Name Dispense Instructions for use Diagnosis    aspirin 81 MG tablet     30 tablet    Take by mouth daily        lisinopril-hydrochlorothiazide 20-12.5 MG per  tablet    PRINZIDE/ZESTORETIC    90 tablet    Take 1 tablet by mouth daily    Benign essential hypertension

## 2018-08-15 NOTE — PROGRESS NOTES
Screening Questionnaire for Adult Immunization    Are you sick today?   No   Do you have allergies to medications, food, a vaccine component or latex?   No   Have you ever had a serious reaction after receiving a vaccination?   No   Do you have a long-term health problem with heart disease, lung disease, asthma, kidney disease, metabolic disease (e.g. diabetes), anemia, or other blood disorder?   No   Do you have cancer, leukemia, HIV/AIDS, or any other immune system problem?   No   In the past 3 months, have you taken medications that affect  your immune system, such as prednisone, other steroids, or anticancer drugs; drugs for the treatment of rheumatoid arthritis, Crohn s disease, or psoriasis; or have you had radiation treatments?   No   Have you had a seizure, or a brain or other nervous system problem?   No   During the past year, have you received a transfusion of blood or blood     products, or been given immune (gamma) globulin or antiviral drug?   No   For women: Are you pregnant or is there a chance you could become        pregnant during the next month?   No   Have you received any vaccinations in the past 4 weeks?   No     Immunization questionnaire answers were all negative.        Per orders of Dr. Fragoso, injection of Td given by Abby Gregory CMA. Patient instructed to remain in clinic for 15 minutes afterwards, and to report any adverse reaction to me immediately.       Screening performed by Abby Gregory CMA on 8/15/2018 at 10:46 AM.

## 2018-08-17 ENCOUNTER — TELEPHONE (OUTPATIENT)
Dept: FAMILY MEDICINE | Facility: CLINIC | Age: 58
End: 2018-08-17

## 2018-08-17 ENCOUNTER — OFFICE VISIT (OUTPATIENT)
Dept: URGENT CARE | Facility: URGENT CARE | Age: 58
End: 2018-08-17
Payer: COMMERCIAL

## 2018-08-17 VITALS
OXYGEN SATURATION: 98 % | SYSTOLIC BLOOD PRESSURE: 144 MMHG | TEMPERATURE: 99.7 F | DIASTOLIC BLOOD PRESSURE: 91 MMHG | HEART RATE: 108 BPM

## 2018-08-17 DIAGNOSIS — L03.114 CELLULITIS OF LEFT UPPER EXTREMITY: Primary | ICD-10-CM

## 2018-08-17 PROCEDURE — 99214 OFFICE O/P EST MOD 30 MIN: CPT | Performed by: INTERNAL MEDICINE

## 2018-08-17 RX ORDER — CEPHALEXIN 500 MG/1
500 CAPSULE ORAL 4 TIMES DAILY
Qty: 40 CAPSULE | Refills: 0 | Status: SHIPPED | OUTPATIENT
Start: 2018-08-17 | End: 2019-08-29

## 2018-08-17 ASSESSMENT — ENCOUNTER SYMPTOMS
CHILLS: 1
VOMITING: 0
FEVER: 1
NAUSEA: 0
MYALGIAS: 0
DIZZINESS: 0
SHORTNESS OF BREATH: 0
DIARRHEA: 0

## 2018-08-17 NOTE — TELEPHONE ENCOUNTER
Reason for call:  Patient reporting a symptom    Symptom or request: Tetanus Reaction     Duration (how long have symptoms been present): 24 hours    Have you been treated for this before? No    Additional comments:   It is red, yesterday hurting.  It is painful, itching and it has grown in circumference since yesterday.      Phone Number patient can be reached at:  Home number on file [phone] (home)    Best Time:  Any     Can we leave a detailed message on this number:  YES    Call taken on 8/17/2018 at 8:11 AM by Ciarra Chavarria

## 2018-08-17 NOTE — TELEPHONE ENCOUNTER
See VIS from [facility] - discussed this information with patient     Had tetanus booster vaccine on Wednesday  Pain is going away  Swelling, warmth, red, itching at side continues   4 inches in diameter redness   Did not check temp     Discussed patient should ice area, try Benadryl/benadryl cream over the area. If no improvement, call back later today or if any worsening call back. Discussed if any rapid HR, hives, or neck/facial swelling need immediate medical attention. Call back if fever >101F    Pt agreeable to this plan    Carmencita MELO RN

## 2018-08-17 NOTE — MR AVS SNAPSHOT
After Visit Summary   8/17/2018    Ricki Acuna    MRN: [id number]           Patient Information     Date Of Birth          1960        Visit Information        Provider Department      8/17/2018 6:55 PM Patrick Rockwell MD [facility]        Today's Diagnoses     Cellulitis of left upper extremity    -  1      Care Instructions    Call doctor if your left upper arm redness persist/worsens, or if you develop new symptoms or side effects from the medication.            Follow-ups after your visit        Who to contact     If you have questions or need follow up information about today's clinic visit or your schedule please contact [facility] directly at [phone].  Normal or non-critical lab and imaging results will be communicated to you by MyChart, letter or phone within 4 business days after the clinic has received the results. If you do not hear from us within 7 days, please contact the clinic through MyChart or phone. If you have a critical or abnormal lab result, we will notify you by phone as soon as possible.  Submit refill requests through [employer] or call your pharmacy and they will forward the refill request to us. Please allow 3 business days for your refill to be completed.          Additional Information About Your Visit        Care EveryWhere ID     This is your Care EveryWhere ID. This could be used by other organizations to access your [facility] medical records  [id number]        Your Vitals Were     Pulse Temperature Pulse Oximetry             108 99.7  F (37.6  C) (Tympanic) 98%          Blood Pressure from Last 3 Encounters:   08/17/18 (!) 144/91   08/15/18 130/88   02/06/18 (!) 143/95    Weight from Last 3 Encounters:   08/15/18 230 lb (104.3 kg)   02/06/18 239 lb 6.7 oz (108.6 kg)   08/10/17 238 lb (108 kg)              Today, you had the following     No orders found for display         Today's  Medication Changes          These changes are accurate as of 8/17/18  7:11 PM.  If you have any questions, ask your nurse or doctor.               Start taking these medicines.        Dose/Directions    cephALEXin 500 MG capsule   Commonly known as:  KEFLEX   Used for:  Cellulitis of left upper extremity   Started by:  Patrick Rockwell MD        Dose:  500 mg   Take 1 capsule (500 mg) by mouth 4 times daily   Quantity:  40 capsule   Refills:  0            Where to get your medicines      These medications were sent to [facility], [place] - [address] AT [address] [place] [address] & [facility]  [address], [place] [address]    Hours:  24-hours Phone:  [phone]     cephALEXin 500 MG capsule                Primary Care Provider Office Phone # Fax #    Bryan Fragoso [phone] [address]  [place] [address]        Equal Access to Services     Kern ValleyJOSÉ MIGUEL AH: Hadii aad ku hadasho Soomaali, waaxda luqadaha, qaybta kaalmada adeegyada, waxay idiin hayaan machelle kharaambika valles . So [facility] [phone].    ATENCIÓN: Si habla español, tiene a de leon disposición servicios gratuitos de asistencia lingüística. Llame al [phone].    We comply with applicable federal civil rights laws and [place] laws. We do not discriminate on the basis of race, color, national origin, age, disability, sex, sexual orientation, or gender identity.            Thank you!     Thank you for choosing [facility]  for your care. Our goal is always to provide you with excellent care. Hearing back from our patients is one way we can continue to improve our services. Please take a few minutes to complete the written survey that you may receive in the mail after your visit with us. Thank you!             Your Updated Medication List - Protect others around you: Learn how to safely use, store and throw away your medicines at www.disposemymeds.org.          This  list is accurate as of 8/17/18  7:11 PM.  Always use your most recent med list.                   Brand Name Dispense Instructions for use Diagnosis    aspirin 81 MG tablet     30 tablet    Take by mouth daily        cephALEXin 500 MG capsule    KEFLEX    40 capsule    Take 1 capsule (500 mg) by mouth 4 times daily    Cellulitis of left upper extremity       lisinopril-hydrochlorothiazide 20-12.5 MG per tablet    PRINZIDE/ZESTORETIC    90 tablet    Take 1 tablet by mouth daily    Benign essential hypertension

## 2018-08-18 ENCOUNTER — OFFICE VISIT (OUTPATIENT)
Dept: URGENT CARE | Facility: URGENT CARE | Age: 58
End: 2018-08-18
Payer: COMMERCIAL

## 2018-08-18 VITALS
SYSTOLIC BLOOD PRESSURE: 134 MMHG | HEART RATE: 84 BPM | OXYGEN SATURATION: 98 % | TEMPERATURE: 98.2 F | DIASTOLIC BLOOD PRESSURE: 84 MMHG

## 2018-08-18 DIAGNOSIS — L03.114 CELLULITIS OF LEFT UPPER EXTREMITY: Primary | ICD-10-CM

## 2018-08-18 LAB
ERYTHROCYTE [DISTWIDTH] IN BLOOD BY AUTOMATED COUNT: 13.7 % (ref 10–15)
HCT VFR BLD AUTO: 44.2 % (ref 40–53)
HGB BLD-MCNC: 15 G/DL (ref 13.3–17.7)
MCH RBC QN AUTO: 31.1 PG (ref 26.5–33)
MCHC RBC AUTO-ENTMCNC: 33.9 G/DL (ref 31.5–36.5)
MCV RBC AUTO: 92 FL (ref 78–100)
PLATELET # BLD AUTO: 287 10E9/L (ref 150–450)
RBC # BLD AUTO: 4.82 10E12/L (ref 4.4–5.9)
WBC # BLD AUTO: 8.4 10E9/L (ref 4–11)

## 2018-08-18 PROCEDURE — 99213 OFFICE O/P EST LOW 20 MIN: CPT | Performed by: PHYSICIAN ASSISTANT

## 2018-08-18 PROCEDURE — 36415 COLL VENOUS BLD VENIPUNCTURE: CPT | Performed by: PHYSICIAN ASSISTANT

## 2018-08-18 PROCEDURE — 85027 COMPLETE CBC AUTOMATED: CPT | Performed by: PHYSICIAN ASSISTANT

## 2018-08-18 NOTE — PROGRESS NOTES
CHIEF COMPLAINT:   Chief Complaint   Patient presents with     Urgent Care     Derm Problem     came into UC lastnight with rash. He started anti bitoics last night and its not getting better.        HPI: Ricki Acuna is a 57 year old male who presents to clinic today for evaluation of rash on left upper arm. Patient received TDAP vaccine on Wed, 4 days ago. Thursday, he noticed a small area of redness and soreness at the injection site. Yesterday, he was seen in UC for increased redness, pain and feeling feverish. He was treated with Keflex. The pain has mostly resolved, although the redness continues to spread. He admits to mild itchiness in the area. He denies feeling feverish today, or discharge from the injection site.      Past Medical History:   Diagnosis Date     Adenomatous colon polyp 3-20-15    repeat in 3 years      Hypertension      Skin cancer     Dr. Antoine Aguirre MD DERMATOLOGY SPECIALIST      Past Surgical History:   Procedure Laterality Date     CHOLECYSTECTOMY      open     COLONOSCOPY  3/20/15    hx polyps     COLONOSCOPY N/A 3/20/2015    Procedure: COMBINED COLONOSCOPY, SINGLE OR MULTIPLE BIOPSY/POLYPECTOMY BY BIOPSY;  Surgeon: Chun Joe MD;  Location:  GI     HERNIA REPAIR      inguinal hernia repair     Social History   Substance Use Topics     Smoking status: Current Every Day Smoker     Packs/day: 0.50     Types: Cigarettes     Smokeless tobacco: Never Used     Alcohol use 4.2 oz/week     0 Standard drinks or equivalent, 7 Glasses of wine per week      Comment: 1/day, wine     Current Outpatient Prescriptions   Medication     aspirin 81 MG tablet     cephALEXin (KEFLEX) 500 MG capsule     lisinopril-hydrochlorothiazide (PRINZIDE/ZESTORETIC) 20-12.5 MG per tablet     No current facility-administered medications for this visit.      No Known Allergies    10 point ROS of systems including Constitutional, Eyes, Respiratory, Cardiovascular, Gastroenterology, Genitourinary,  Integumentary, Muscularskeletal, Psychiatric were all negative except for pertinent positives noted in my HPI.        Exam:  /84  Pulse 84  Temp 98.2  F (36.8  C) (Oral)  SpO2 98%  Constitutional: healthy, alert and no distress  Head: Normocephalic, atraumatic.  Eyes: conjunctiva clear, no drainage  ENT: throat without tonsillar hypertrophy or erythema. NO swelling, trismus or drooling.   Neck: neck is supple, no cervical lymphadenopathy or nuchal rigidity  Cardiovascular: RRR  Respiratory: CTA bilaterally, no rhonchi or rales  Gastrointestinal: soft and nontender  M/S: FROM at shoulder and elbow without TTP  Skin: Erythematous, non tender patch noted on L UE, lateral aspect. No drainage or swelling or induration noted.   Neurologic: Speech clear, gait normal. Moves all extremities.    Results for orders placed or performed in visit on 08/18/18   CBC with platelets   Result Value Ref Range    WBC 8.4 4.0 - 11.0 10e9/L    RBC Count 4.82 4.4 - 5.9 10e12/L    Hemoglobin 15.0 13.3 - 17.7 g/dL    Hematocrit 44.2 40.0 - 53.0 %    MCV 92 78 - 100 fl    MCH 31.1 26.5 - 33.0 pg    MCHC 33.9 31.5 - 36.5 g/dL    RDW 13.7 10.0 - 15.0 %    Platelet Count 287 150 - 450 10e9/L         ASSESSMENT/PLAN:  1. Cellulitis of left upper extremity  57-year-old male currently being treated for cellulitis following a Tdap vaccination prevents for recheck of his left upper arm.  Redness continues to worsen, although the pain and swelling have improved.  Patient admits to some itchiness, which makes me believe that it is more likely to be an injection reaction versus a cellulitis.  The patient is not showing any systemic signs or signs of septic joint as he has full range of motion in his shoulder and elbow.  We discussed using prednisone for swelling, and patient declines as he does not want to add another pill to his regimen. Continue with Keflex until course is finished. Discussed worsening symptoms/systemic symptoms and when he  needs to follow-up.  Patient agrees with treatment plan.  - CBC with platelets    Luz Marina Rivero PA-C

## 2018-08-18 NOTE — MR AVS SNAPSHOT
After Visit Summary   8/18/2018    Ricki Acuna    MRN: [id number]           Patient Information     Date Of Birth          1960        Visit Information        Provider Department      8/18/2018 10:45 AM Luz Marina Rivero PA-C [facility]        Today's Diagnoses     Cellulitis of left upper extremity    -  1       Follow-ups after your visit        Who to contact     If you have questions or need follow up information about today's clinic visit or your schedule please contact [facility] directly at [phone].  Normal or non-critical lab and imaging results will be communicated to you by MyChart, letter or phone within 4 business days after the clinic has received the results. If you do not hear from us within 7 days, please contact the clinic through MyChart or phone. If you have a critical or abnormal lab result, we will notify you by phone as soon as possible.  Submit refill requests through [employer] or call your pharmacy and they will forward the refill request to us. Please allow 3 business days for your refill to be completed.          Additional Information About Your Visit        Care EveryWhere ID     This is your Care EveryWhere ID. This could be used by other organizations to access your [facility] medical records  [id number]        Your Vitals Were     Pulse Temperature Pulse Oximetry             84 98.2  F (36.8  C) (Oral) 98%          Blood Pressure from Last 3 Encounters:   08/18/18 134/84   08/17/18 (!) 144/91   08/15/18 130/88    Weight from Last 3 Encounters:   08/15/18 230 lb (104.3 kg)   02/06/18 239 lb 6.7 oz (108.6 kg)   08/10/17 238 lb (108 kg)              We Performed the Following     CBC with platelets        Primary Care Provider Office Phone # Fax #    Bryan Fragoso [phone] [address]  [place] [address]        Equal Access to Services     PAT BOLES AH: Efe foster  sridhar Caballero, lidiada lumartinadaha, qacamilata kaventura wood, radha abisaiin hayaan rachanalinda taybetty ladavidmakenna mallory. So [facility] [phone].    ATENCIÓN: Si habla español, tiene a de leon disposición servicios gratuitos de asistencia lingüística. Zen al [phone].    We comply with applicable federal civil rights laws and [place] laws. We do not discriminate on the basis of race, color, national origin, age, disability, sex, sexual orientation, or gender identity.            Thank you!     Thank you for choosing [facility]  for your care. Our goal is always to provide you with excellent care. Hearing back from our patients is one way we can continue to improve our services. Please take a few minutes to complete the written survey that you may receive in the mail after your visit with us. Thank you!             Your Updated Medication List - Protect others around you: Learn how to safely use, store and throw away your medicines at www.disposemymeds.org.          This list is accurate as of 8/18/18 11:59 PM.  Always use your most recent med list.                   Brand Name Dispense Instructions for use Diagnosis    aspirin 81 MG tablet     30 tablet    Take by mouth daily        cephALEXin 500 MG capsule    KEFLEX    40 capsule    Take 1 capsule (500 mg) by mouth 4 times daily    Cellulitis of left upper extremity       lisinopril-hydrochlorothiazide 20-12.5 MG per tablet    PRINZIDE/ZESTORETIC    90 tablet    Take 1 tablet by mouth daily    Benign essential hypertension

## 2018-08-18 NOTE — PATIENT INSTRUCTIONS
Call doctor if your left upper arm redness persist/worsens, or if you develop new symptoms or side effects from the medication.

## 2018-08-22 ENCOUNTER — TELEPHONE (OUTPATIENT)
Dept: FAMILY MEDICINE | Facility: CLINIC | Age: 58
End: 2018-08-22

## 2018-08-22 NOTE — TELEPHONE ENCOUNTER
Pt received Tdap vaccine from myself on Wed. 8-15-18. Pt had reaction at injection site. (See previous U/C encounters). I completed VAERS form on 8-20-18 and mailed to them on 8-21-18.BRII Gregory, CMA

## 2018-08-23 ENCOUNTER — HOSPITAL ENCOUNTER (OUTPATIENT)
Dept: CT IMAGING | Facility: CLINIC | Age: 58
Discharge: HOME | End: 2018-08-23
Attending: INTERNAL MEDICINE | Admitting: INTERNAL MEDICINE
Payer: COMMERCIAL

## 2018-08-23 DIAGNOSIS — Z12.2 ENCOUNTER FOR SCREENING FOR LUNG CANCER: ICD-10-CM

## 2018-08-23 PROCEDURE — 71250 CT THORAX DX C-: CPT

## 2018-08-23 NOTE — LETTER
[place] [address] [facility]  [address]  [place]  [address]  Tel: [phone]    August 24, 2018    Ricki Jose Armand  [address] [address]        Dear Mr. Acuna,    The following letter pertains to your most recent diagnostic tests:    Good news! Your lung cancer screening CT does NOT show any dangerous findings.  We should recheck in one year.      Sincerely,    Bryan Fragoso MD/ANA

## 2018-08-24 NOTE — PROGRESS NOTES
The following letter pertains to your most recent diagnostic tests:    Good news! Your lung cancer screening CT does NOT show any dangerous findings.  We should recheck in one year.        Sincerely,    Dr. Fragoso

## 2018-12-16 ENCOUNTER — TRANSFERRED RECORDS (OUTPATIENT)
Dept: HEALTH INFORMATION MANAGEMENT | Facility: CLINIC | Age: 58
End: 2018-12-16

## 2019-04-26 ENCOUNTER — OFFICE VISIT (OUTPATIENT)
Dept: URGENT CARE | Facility: URGENT CARE | Age: 59
End: 2019-04-26
Payer: COMMERCIAL

## 2019-04-26 VITALS
WEIGHT: 230 LBS | DIASTOLIC BLOOD PRESSURE: 85 MMHG | BODY MASS INDEX: 30.14 KG/M2 | SYSTOLIC BLOOD PRESSURE: 141 MMHG | OXYGEN SATURATION: 98 % | TEMPERATURE: 97.4 F | HEART RATE: 82 BPM
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
## 2019-04-26 PROCEDURE — 99213 OFFICE O/P EST LOW 20 MIN: CPT | Performed by: FAMILY MEDICINE

## 2019-04-26 RX ORDER — DOXYCYCLINE 100 MG/1
100 CAPSULE ORAL DAILY
Qty: 2 CAPSULE | Refills: 0 | Status: SHIPPED | OUTPATIENT
Start: 2019-04-26 | End: 2019-08-29

## 2019-04-27 NOTE — PATIENT INSTRUCTIONS
Patient Education     Tick Bite, Antibiotic Treatment    Ticks are small arachnids that feed on the blood of rodents, rabbits, birds, deer, dogs and humans. The bite may cause a local reaction like that of a spider, with a small amount of local redness, itching and slight swelling. Sometimes there is no local reaction.  Most tick bites are harmless. But some ticks carry diseases, such as Lyme disease or Hemanth Mountain spotted fever. These can be passed to people at the time of the bite. Lyme disease is of greatest concern. Right now you have no symptoms of Lyme disease or other serious reaction to the bite. It is important to watch for the warning signs, which could appear weeks to months after the tick bite.  Home care  The following guidelines can help you care for your bite at home:    If itching is a problem, avoid tight clothing and anything that heats up your skin. This includes hot showers or baths and direct sunlight. This often makes the itching worse.    An ice pack will reduce local areas of redness and itching. Make your own ice pack by putting ice cubes in a zip-top plastic bag and wrapping it in a thin towel. Over-the-counter creams containing benzocaine may help with itching.    You can use an antihistamine with diphenhydramine if your doctor did not give you another antihistamine. This medicine may be used to reduce itching if large areas of the skin are involved. It is available at drugstores and grocery stores. If symptoms continue, talk with your doctor or pharmacist about other over-the counter medicines that may be helpful.    Your doctor may prescribe antibiotics to reduce your risk of getting Lyme disease. It is very important that you take them exactly as directed until they are completely finished.  Follow-up care  Follow up with your healthcare provider, or as advised.  Call 911  Call 911 if any of these occur:    Irregular or rapid heartbeat    Numbness, tingling, or weakness in the arms  or legs  When to seek medical advice  Call your healthcare provider right away if any of these occur:  Signs of local infection. Watch for these during the next few days:    Increasing redness around the bite site    Increased pain or swelling    Fever over 100.4 F (38 C), or as directed by your healthcare provider    Fluid draining from the bite area  Signs of tick-related disease. Watch for these over the next few weeks to months:    Circular, red, ring-like rash appears at the bite area within 1 to 3 weeks    Tiredness, fever or chills, nausea or vomiting    Neck pain or stiffness, headache, or confusion    Muscle or bone aches    Joint pain or swelling, especially in the knee    Weakness on one side of the face  Date Last Reviewed: 10/1/2016    0193-0056 [employer]. [address] [place] [address], [place] [id number]. All rights reserved. This information is not intended as a substitute for professional medical care. Always follow your healthcare professional's instructions.           Patient Education     Tick Facts    Ticks are small arachnids that feed on the blood of rodents, rabbits, birds, deer, dogs, and humans. Ticks do not fly and do not drop from trees. They climb to the tips of plants along trails and attach themselves to you as you brush against the plant. Ticks may also attach to you if you come in contact with an infested animal.  Avoiding ticks  The following tips will help you avoid ticks:    When walking in tick-infested areas, tuck your pants into your boots or socks, and tuck your shirt into your pants.    Wear light-colored clothing so you can easily see any ticks that get on you.    Apply a tick repellent to pants, socks, and shoes.    Avoid brushing against the plants along trails.    Check yourself and your children at the end of each day when hiking through tick-infested areas. Don't forget to check in your hair as well.  Removing ticks  Removing ticks right away will reduce the  chance of disease. Here are some tips for removing ticks:    If possible, have someone else remove the tick from you.    Protect your hands from exposure to the tick by using a tissue or rubber glove.    Ticks have hook-like barbs on their mouth, which they use to attach themselves. Use tweezers or small needle-nose pliers instead of your fingers when removing a tick. Grasp the head as close to the skin as possible. Be careful not to squeeze the body, which would inject more fluid from the tick into your skin.    Pull gently and slowly away from skin until the mouthparts let go. If the tick fails to let go, stop pulling. While holding the head with tweezers, slowly turn it 90 degrees. Then, gently pull away from the skin again. This movement may unlock the tick's jaw and make it easier to remove.    Once you have removed the tick, look closely at the bite area. If you think there are still parts of the tick in your skin that you can't remove, contact your doctor.    Wash your hands and the bite site with soap and water.  Do not:    Crush or squeeze the tick with the tweezers.    Jerk the tick.    Burn or prick the tick.    Try to suffocate the tick with petroleum jelly or nail polish.  Identifying ticks  Most tick bites are harmless. But some ticks carry diseases, such as Lyme disease and Hemanth Mountain spotted fever. These can spread to people. Lyme disease is of greatest concern. It is carried by only one type of tick, the deer tick. Many public health departments are able to identify a tick to figure out if it is the type that causes Lyme disease. If this service is available in your area, bring the removed tick in a zip-top bag or jar to the public health department for identification. If you cannot identify the tick and if you were bitten in an area of the country where there is a risk of Lyme disease, contact your doctor.  Date Last Reviewed: 9/1/2016 2000-2018 [employer]. [address], [place] [address]. All rights reserved. This information is not intended as a substitute for professional medical care. Always follow your healthcare professional's instructions.

## 2019-04-29 NOTE — PROGRESS NOTES
SUBJECTIVE:  Ricki Acuna, a 58 year old male scheduled an appointment to discuss the following issues:  Tick bite, initial encounter    Medical, social, surgical, and family histories reviewed.    Urgent Care    Insect Bites (spot on right leg. reddness around it. 1 day. Painful to touch.)   Pt walks his dog in am, could have gotten a tick bite.  Did not see the tick however.  No hx of MRSA.  No difficulty walking.    ROS:  See HPI.  No nausea/vomiting.  No fever/chills.  No chest pain/SOB.  No BM/urine problems.  No syncope.      OBJECTIVE:  /85   Pulse 82   Temp 97.4  F (36.3  C) (Oral)   Wt 104.3 kg (230 lb)   SpO2 98%   BMI 30.14 kg/m    EXAM:  GENERAL APPEARANCE:  alert and no distress, afebrile  EYES: Eyes grossly normal to inspection, PERRL and conjunctivae and sclerae normal  HENT: ear canals and TM's normal and nose and mouth without ulcers or lesions  NECK: no adenopathy, no asymmetry, masses, or scars and thyroid normal to palpation  RESP: lungs clear to auscultation - no rales, rhonchi or wheezes  CV: regular rates and rhythm, normal S1 S2, no S3 or S4 and no murmur, click or rub  LYMPHATICS: no cervical adenopathy  MS: extremities normal- no gross deformities noted  SKIN: At right lower leg mid calf, there is a dime-size red spot with central bite wound; no induration or ascending lymphangitis  NEURO: Normal strength and tone, mentation intact and speech normal    ASSESSMENT/PLAN:  (W57.XXXA) Tick bite, initial encounter  (primary encounter diagnosis)  Plan: doxycycline hyclate (VIBRAMYCIN) 100 MG capsule for prophylaxis   Care instructions given.  Pt to f/up PCP if no improvement or worsening.  Warning signs and symptoms explained.

## 2019-08-20 DIAGNOSIS — I10 BENIGN ESSENTIAL HYPERTENSION: ICD-10-CM

## 2019-08-20 RX ORDER — LISINOPRIL AND HYDROCHLOROTHIAZIDE 12.5; 2 MG/1; MG/1
TABLET ORAL
Qty: 90 TABLET | Refills: 1 | Status: SHIPPED | OUTPATIENT
Start: 2019-08-20 | End: 2020-02-18

## 2019-08-20 NOTE — TELEPHONE ENCOUNTER
Routing refill request to provider for review/approval because:  BP put of range- pt scheduled with PCP next week.  Please authorize if appropriate.  Thanks,  Chiara Britton RN        '

## 2019-08-29 ENCOUNTER — OFFICE VISIT (OUTPATIENT)
Dept: FAMILY MEDICINE | Facility: CLINIC | Age: 59
End: 2019-08-29
Payer: COMMERCIAL

## 2019-08-29 VITALS
BODY MASS INDEX: 31.95 KG/M2 | TEMPERATURE: 98.5 F | HEIGHT: 73 IN | DIASTOLIC BLOOD PRESSURE: 80 MMHG | OXYGEN SATURATION: 98 % | SYSTOLIC BLOOD PRESSURE: 134 MMHG | HEART RATE: 81 BPM | WEIGHT: 241.1 LBS

## 2019-08-29 DIAGNOSIS — E78.5 HYPERLIPIDEMIA LDL GOAL <160: ICD-10-CM

## 2019-08-29 DIAGNOSIS — Z00.00 ROUTINE GENERAL MEDICAL EXAMINATION AT A HEALTH CARE FACILITY: Primary | ICD-10-CM

## 2019-08-29 DIAGNOSIS — F17.200 TOBACCO USE DISORDER: ICD-10-CM

## 2019-08-29 DIAGNOSIS — Z12.11 SCREEN FOR COLON CANCER: ICD-10-CM

## 2019-08-29 DIAGNOSIS — I10 BENIGN ESSENTIAL HYPERTENSION: ICD-10-CM

## 2019-08-29 DIAGNOSIS — Z12.5 PROSTATE CANCER SCREENING: ICD-10-CM

## 2019-08-29 PROCEDURE — 99396 PREV VISIT EST AGE 40-64: CPT | Performed by: INTERNAL MEDICINE

## 2019-08-29 ASSESSMENT — MIFFLIN-ST. JEOR: SCORE: 1971.75

## 2019-08-29 NOTE — PROGRESS NOTES
SUBJECTIVE:   CC: Ricki Acuna is an 58 year old male who presents for preventative health visit.     Healthy Habits:    Getting at least 3 servings of Calcium per day:  Yes    Bi-annual eye exam:  Yes    Dental care twice a year:  Yes    Sleep apnea or symptoms of sleep apnea:  None    Diet:  Regular (no restrictions)    Frequency of exercise:  6-7 days/week    Duration of exercise:  45-60 minutes    Taking medications regularly:  Yes    Barriers to taking medications:  None    Medication side effects:  None    PHQ-2 Total Score:    Additional concerns today:  Yes        Today's PHQ-2 Score:   PHQ-2 ( 1999 Pfizer) 8/29/2019   Q1: Little interest or pleasure in doing things 0   Q2: Feeling down, depressed or hopeless -   PHQ-2 Score -       Abuse: Current or Past(Physical, Sexual or Emotional)- No  Do you feel safe in your environment? Yes    Social History     Tobacco Use     Smoking status: Current Every Day Smoker     Packs/day: 0.50     Types: Cigarettes     Smokeless tobacco: Never Used   Substance Use Topics     Alcohol use: Yes     Alcohol/week: 4.2 oz     Types: 7 Glasses of wine per week     Comment: 1/day, wine     If you drink alcohol do you typically have >3 drinks per day or >7 drinks per week? No    No flowsheet data found.  Last PSA:   PSA   Date Value Ref Range Status   08/15/2018 1.23 0 - 4 ug/L Final     Comment:     Assay Method:  Chemiluminescence using Siemens Vista analyzer       Reviewed orders with patient. Reviewed health maintenance and updated orders accordingly - Yes  Patient Active Problem List   Diagnosis     Benign essential hypertension     Adenomatous colon polyp     Hyperlipidemia LDL goal <160     Tobacco use disorder     Past Surgical History:   Procedure Laterality Date     CHOLECYSTECTOMY      open     COLONOSCOPY  3/20/15    hx polyps     COLONOSCOPY N/A 3/20/2015    Procedure: COMBINED COLONOSCOPY, SINGLE OR MULTIPLE BIOPSY/POLYPECTOMY BY BIOPSY;  Surgeon: Tatyana  "MD Chun;  Location:  GI     HERNIA REPAIR      inguinal hernia repair       Social History     Tobacco Use     Smoking status: Current Every Day Smoker     Packs/day: 0.50     Types: Cigarettes     Smokeless tobacco: Never Used   Substance Use Topics     Alcohol use: Yes     Alcohol/week: 4.2 oz     Types: 7 Glasses of wine per week     Comment: 1/day, wine     Family History   Problem Relation Age of Onset     Prostate Cancer Father      Cerebrovascular Disease Mother      Diabetes Mother          Current Outpatient Medications   Medication Sig Dispense Refill     aspirin 81 MG tablet Take by mouth daily 30 tablet      lisinopril-hydrochlorothiazide (PRINZIDE/ZESTORETIC) 20-12.5 MG tablet TAKE 1 TABLET BY MOUTH DAILY 90 tablet 1     Allergies   Allergen Reactions     No Known Allergies        Reviewed and updated as needed this visit by clinical staff  Tobacco  Allergies  Meds  Med Hx  Surg Hx  Fam Hx  Soc Hx        Reviewed and updated as needed this visit by Provider  Tobacco  Med Hx  Surg Hx  Fam Hx  Soc Hx       Past Medical History:   Diagnosis Date     Adenomatous colon polyp 3-20-15    repeat in 3 years      Hypertension      Skin cancer     Dr. Antoine Aguirre MD DERMATOLOGY SPECIALIST       Past Surgical History:   Procedure Laterality Date     CHOLECYSTECTOMY      open     COLONOSCOPY  3/20/15    hx polyps     COLONOSCOPY N/A 3/20/2015    Procedure: COMBINED COLONOSCOPY, SINGLE OR MULTIPLE BIOPSY/POLYPECTOMY BY BIOPSY;  Surgeon: Chun Joe MD;  Location:  GI     HERNIA REPAIR      inguinal hernia repair       Review of Systems  A 10 organ systems ROS is negative.     OBJECTIVE:   /80 (BP Location: Right arm, Patient Position: Sitting, Cuff Size: Adult Regular)   Pulse 81   Temp 98.5  F (36.9  C) (Tympanic)   Ht 1.845 m (6' 0.64\")   Wt 109.4 kg (241 lb 1.6 oz)   SpO2 98%   BMI 32.13 kg/m      Physical Exam  GENERAL: healthy, alert and no distress  EYES: Eyes grossly " normal to inspection, PERRL and conjunctivae and sclerae normal  HENT: ear canals and TM's normal, nose and mouth without ulcers or lesions  NECK: no adenopathy, no asymmetry, masses, or scars and thyroid normal to palpation  RESP: lungs clear to auscultation - no rales, rhonchi or wheezes  CV: regular rate and rhythm, normal S1 S2, no S3 or S4, no murmur, click or rub, no peripheral edema and peripheral pulses strong  ABDOMEN: soft, nontender, no hepatosplenomegaly, no masses and bowel sounds normal  RECTAL: normal sphincter tone, no rectal masses, prostate normal size, smooth, nontender without nodules or masses  MS: no gross musculoskeletal defects noted, no edema  SKIN: no suspicious lesions or rashes  NEURO: Normal strength and tone, mentation intact and speech normal  PSYCH: mentation appears normal, affect normal/bright    Labs pending     ASSESSMENT/PLAN:   1. Routine general medical examination at a health care facility      2. Benign essential hypertension  OK blood pressure on second check    3. Hyperlipidemia LDL goal <160  Recheck lipids tomorrow fasting   - Comprehensive metabolic panel; Future  - CBC with platelets; Future  - Lipid panel reflex to direct LDL Fasting; Future    4. Prostate cancer screening    - Prostate spec antigen screen; Future    5. Tobacco use disorder  Recheck screening CT  - CT Chest Lung Cancer Scrn Low Dose wo; Future    6. Screen for colon cancer  Reminded to schedule colonoscopy   - GASTROENTEROLOGY ADULT REF PROCEDURE ONLY [facility] [phone]    COUNSELING:   Reviewed preventive health counseling, as reflected in patient instructions  Special attention given to:        Regular exercise       Healthy diet/nutrition       Immunizations      Shingrix recommended            Consider Hep C screening for patients born between 1945 and 1965; done       HIV screeninx in teen years, 1x in adult years, and at intervals if high risk; done       Colon cancer  "screening; due for recheck, referred, discussed        Prostate cancer screening; PSA today        Consider lung cancer screening for ages 55-80 years and 30 pack-year smoking history  ; referred for exam again    Estimated body mass index is 32.13 kg/m  as calculated from the following:    Height as of this encounter: 1.845 m (6' 0.64\").    Weight as of this encounter: 109.4 kg (241 lb 1.6 oz).     Weight management plan: Discussed healthy diet and exercise guidelines     reports that he has been smoking cigarettes.  He has been smoking about 0.50 packs per day. He has never used smokeless tobacco.  Tobacco Cessation Action Plan: Self help information given to patient    Counseling Resources:  ATP IV Guidelines  Pooled Cohorts Equation Calculator  FRAX Risk Assessment  ICSI Preventive Guidelines  Dietary Guidelines for Americans, 2010  USDA's MyPlate  ASA Prophylaxis  Lung CA Screening    Bryan Fragoso MD  [facility]  "

## 2019-08-29 NOTE — PATIENT INSTRUCTIONS
"Please call [place] radiology at [phone] to schedule your CT chest.     You should get the new shingles vaccine series \"SHINGRIX\" (not Zostavax) at a pharmacy.    "

## 2019-08-30 DIAGNOSIS — E78.5 HYPERLIPIDEMIA LDL GOAL <160: ICD-10-CM

## 2019-08-30 DIAGNOSIS — Z12.5 PROSTATE CANCER SCREENING: ICD-10-CM

## 2019-08-30 LAB
ALBUMIN SERPL-MCNC: 3.8 G/DL (ref 3.4–5)
ALP SERPL-CCNC: 115 U/L (ref 40–150)
ALT SERPL W P-5'-P-CCNC: 52 U/L (ref 0–70)
ANION GAP SERPL CALCULATED.3IONS-SCNC: 8 MMOL/L (ref 3–14)
AST SERPL W P-5'-P-CCNC: 19 U/L (ref 0–45)
BILIRUB SERPL-MCNC: 0.4 MG/DL (ref 0.2–1.3)
BUN SERPL-MCNC: 12 MG/DL (ref 7–30)
CALCIUM SERPL-MCNC: 8.8 MG/DL (ref 8.5–10.1)
CHLORIDE SERPL-SCNC: 111 MMOL/L (ref 94–109)
CHOLEST SERPL-MCNC: 179 MG/DL
CO2 SERPL-SCNC: 24 MMOL/L (ref 20–32)
CREAT SERPL-MCNC: 0.74 MG/DL (ref 0.66–1.25)
ERYTHROCYTE [DISTWIDTH] IN BLOOD BY AUTOMATED COUNT: 14 % (ref 10–15)
GFR SERPL CREATININE-BSD FRML MDRD: >90 ML/MIN/{1.73_M2}
GLUCOSE SERPL-MCNC: 108 MG/DL (ref 70–99)
HCT VFR BLD AUTO: 43.4 % (ref 40–53)
HDLC SERPL-MCNC: 31 MG/DL
HGB BLD-MCNC: 14.3 G/DL (ref 13.3–17.7)
LDLC SERPL CALC-MCNC: 120 MG/DL
MCH RBC QN AUTO: 30.7 PG (ref 26.5–33)
MCHC RBC AUTO-ENTMCNC: 32.9 G/DL (ref 31.5–36.5)
MCV RBC AUTO: 93 FL (ref 78–100)
NONHDLC SERPL-MCNC: 148 MG/DL
PLATELET # BLD AUTO: 320 10E9/L (ref 150–450)
POTASSIUM SERPL-SCNC: 4 MMOL/L (ref 3.4–5.3)
PROT SERPL-MCNC: 7.1 G/DL (ref 6.8–8.8)
PSA SERPL-ACNC: 1.66 UG/L (ref 0–4)
RBC # BLD AUTO: 4.66 10E12/L (ref 4.4–5.9)
SODIUM SERPL-SCNC: 143 MMOL/L (ref 133–144)
TRIGL SERPL-MCNC: 142 MG/DL
WBC # BLD AUTO: 7.5 10E9/L (ref 4–11)

## 2019-08-30 PROCEDURE — 80061 LIPID PANEL: CPT | Performed by: INTERNAL MEDICINE

## 2019-08-30 PROCEDURE — 36415 COLL VENOUS BLD VENIPUNCTURE: CPT | Performed by: INTERNAL MEDICINE

## 2019-08-30 PROCEDURE — 85027 COMPLETE CBC AUTOMATED: CPT | Performed by: INTERNAL MEDICINE

## 2019-08-30 PROCEDURE — G0103 PSA SCREENING: HCPCS | Performed by: INTERNAL MEDICINE

## 2019-08-30 PROCEDURE — 80053 COMPREHEN METABOLIC PANEL: CPT | Performed by: INTERNAL MEDICINE

## 2019-08-30 NOTE — LETTER
"[facility]  [address] [facility]  [address]  [place]  [address]  Tel: [phone]    September 3, 2019    Ricki Jose Acuna  [address] [facility] [address]  [facility] [address]        Dear Mr. Acuna,    The following letter pertains to your most recent diagnostic tests:    -Your total cholesterol is 179 which is at your goal of total cholesterol less than 200.    -Your triglycerides are 142 which are at your goal of triglycerides less than 150.    -Your HDL or \"good cholesterol\" is 31 which is below your goal of HDL cholesterol greater than 40.    -Your LDL cholesterol or \"bad cholesterol\" is 120 which is above your goal of LDL cholesterol less than <100.  Your LDL goal is based on your risk factors for artery disease including high blood pressure and tobacco use.      -Liver and gallbladder tests are normal for you. (ALT,AST, Alk phos, bilirubin), kidney function is normal for you (Creatinine, GFR), Sodium is normal, Potassium is normal for you, Calcium is normal for you, Glucose (blood sugar) is mildly elevated, but NOT in the diabetic range.      -Your prostate specific antigen (PSA) test result returned normal.     -Your complete blood counts including your hemoglobin returned normal for you.             Bottom line:  Your cholesterol remains moderately elevated.  Based on your cholesterol levels and other risk factors, your calculated statistical risk for having a heart attack over the next 10 years is elevated.  I estimate a 18% chance of heart attack over the next 10 years.  Because we believe that statin medications such as atorvastatin (Lipitor) have protective effects in the blood vessels that extend beyond their ability to lower cholesterol, if you took atorvastatin (Lipitor) regularly, you could bring that 10-year statistical risk for heart attack down significantly.  Generally, we recommend statin cholesterol-lowering medications such as atorvastatin (Lipitor) to individuals with 10 year statistical risk for " heart attack greater than 7.5%.  As such, I would recommend you starting that drug for prevention purposes.  You should be informed that a very small minority of people who take atorvastatin can develop muscle pain and weakness as a side effect from that drug.  If you  experience those side effects, then stop the drug and contact me.  If you start the medication, you should have a follow up fasting cholesterol panel after you have been taking the medication for 2 months.  You can schedule a lab appointment for that purpose.  Please contact me if you would like to get that medication started so we can send an prescritpion and arrange for appropriate follow up.        Another more important way to reduce risk for heart attack is to get rid of all cigarettes.   Pleas contact me if you would like more help with tobacco cessation.         Follow up:  Lab appointment in 2 moths if you start the atorvastatin (Lipitor).  Schedule an appointment for a physical examination with fasting blood tests in one year's time, or return sooner if new questions, symptoms or problems arise.       Sincerely,    Dr. Fragoso    The 10-year ASCVD risk score (Yahaira DC Jr., et al., 2013) is: 18%    Values used to calculate the score:      Age: 58 years      Sex: Male      Is Non- : No      Diabetic: No      Tobacco smoker: Yes      Systolic Blood Pressure: 134 mmHg      Is BP treated: Yes      HDL Cholesterol: 31 mg/dL      Total Cholesterol: 179 mg/dL    If you have any further questions or problems, please contact our office.      Sincerely,    Bryan Fragoso MD/ Perla Mendiola CMA  Results for orders placed or performed in visit on 08/30/19   Prostate spec antigen screen   Result Value Ref Range    PSA 1.66 0 - 4 ug/L   Lipid panel reflex to direct LDL Fasting   Result Value Ref Range    Cholesterol 179 <200 mg/dL    Triglycerides 142 <150 mg/dL    HDL Cholesterol 31 (L) >39 mg/dL    LDL Cholesterol Calculated 120 (H)  <100 mg/dL    Non HDL Cholesterol 148 (H) <130 mg/dL   CBC with platelets   Result Value Ref Range    WBC 7.5 4.0 - 11.0 10e9/L    RBC Count 4.66 4.4 - 5.9 10e12/L    Hemoglobin 14.3 13.3 - 17.7 g/dL    Hematocrit 43.4 40.0 - 53.0 %    MCV 93 78 - 100 fl    MCH 30.7 26.5 - 33.0 pg    MCHC 32.9 31.5 - 36.5 g/dL    RDW 14.0 10.0 - 15.0 %    Platelet Count 320 150 - 450 10e9/L   Comprehensive metabolic panel   Result Value Ref Range    Sodium 143 133 - 144 mmol/L    Potassium 4.0 3.4 - 5.3 mmol/L    Chloride 111 (H) 94 - 109 mmol/L    Carbon Dioxide 24 20 - 32 mmol/L    Anion Gap 8 3 - 14 mmol/L    Glucose 108 (H) 70 - 99 mg/dL    Urea Nitrogen 12 7 - 30 mg/dL    Creatinine 0.74 0.66 - 1.25 mg/dL    GFR Estimate >90 >60 mL/min/[1.73_m2]    GFR Estimate If Black >90 >60 mL/min/[1.73_m2]    Calcium 8.8 8.5 - 10.1 mg/dL    Bilirubin Total 0.4 0.2 - 1.3 mg/dL    Albumin 3.8 3.4 - 5.0 g/dL    Protein Total 7.1 6.8 - 8.8 g/dL    Alkaline Phosphatase 115 40 - 150 U/L    ALT 52 0 - 70 U/L    AST 19 0 - 45 U/L               Enclosure: Lab Results

## 2019-09-01 NOTE — RESULT ENCOUNTER NOTE
"The following letter pertains to your most recent diagnostic tests:    -Your total cholesterol is 179 which is at your goal of total cholesterol less than 200.    -Your triglycerides are 142 which are at your goal of triglycerides less than 150.    -Your HDL or \"good cholesterol\" is 31 which is below your goal of HDL cholesterol greater than 40.    -Your LDL cholesterol or \"bad cholesterol\" is 120 which is above your goal of LDL cholesterol less than <100.  Your LDL goal is based on your risk factors for artery disease including high blood pressure and tobacco use.      -Liver and gallbladder tests are normal for you. (ALT,AST, Alk phos, bilirubin), kidney function is normal for you (Creatinine, GFR), Sodium is normal, Potassium is normal for you, Calcium is normal for you, Glucose (blood sugar) is mildly elevated, but NOT in the diabetic range.      -Your prostate specific antigen (PSA) test result returned normal.     -Your complete blood counts including your hemoglobin returned normal for you.             Bottom line:  Your cholesterol remains moderately elevated.  Based on your cholesterol levels and other risk factors, your calculated statistical risk for having a heart attack over the next 10 years is elevated.  I estimate a 18% chance of heart attack over the next 10 years.  Because we believe that statin medications such as atorvastatin (Lipitor) have protective effects in the blood vessels that extend beyond their ability to lower cholesterol, if you took atorvastatin (Lipitor) regularly, you could bring that 10-year statistical risk for heart attack down significantly.  Generally, we recommend statin cholesterol-lowering medications such as atorvastatin (Lipitor) to individuals with 10 year statistical risk for heart attack greater than 7.5%.  As such, I would recommend you starting that drug for prevention purposes.  You should be informed that a very small minority of people who take atorvastatin can " develop muscle pain and weakness as a side effect from that drug.  If you  experience those side effects, then stop the drug and contact me.  If you start the medication, you should have a follow up fasting cholesterol panel after you have been taking the medication for 2 months.  You can schedule a lab appointment for that purpose.  Please contact me if you would like to get that medication started so we can send an prescritpion and arrange for appropriate follow up.        Another more important way to reduce risk for heart attack is to get rid of all cigarettes.   Liv contact me if you would like more help with tobacco cessation.         Follow up:  Lab appointment in 2 moths if you start the atorvastatin (Lipitor).  Schedule an appointment for a physical examination with fasting blood tests in one year's time, or return sooner if new questions, symptoms or problems arise.       Sincerely,    Dr. Fragoso    The 10-year ASCVD risk score (Yahaira VERMA Jr., et al., 2013) is: 18%    Values used to calculate the score:      Age: 58 years      Sex: Male      Is Non- : No      Diabetic: No      Tobacco smoker: Yes      Systolic Blood Pressure: 134 mmHg      Is BP treated: Yes      HDL Cholesterol: 31 mg/dL      Total Cholesterol: 179 mg/dL

## 2019-09-16 ENCOUNTER — HOSPITAL ENCOUNTER (OUTPATIENT)
Dept: CT IMAGING | Facility: CLINIC | Age: 59
Discharge: HOME | End: 2019-09-16
Attending: INTERNAL MEDICINE | Admitting: INTERNAL MEDICINE
Payer: COMMERCIAL

## 2019-09-16 ENCOUNTER — OFFICE VISIT (OUTPATIENT)
Dept: FAMILY MEDICINE | Facility: CLINIC | Age: 59
End: 2019-09-16
Payer: COMMERCIAL

## 2019-09-16 VITALS
TEMPERATURE: 97.6 F | OXYGEN SATURATION: 98 % | WEIGHT: 239.3 LBS | HEART RATE: 79 BPM | DIASTOLIC BLOOD PRESSURE: 73 MMHG | BODY MASS INDEX: 28.25 KG/M2 | SYSTOLIC BLOOD PRESSURE: 123 MMHG | HEIGHT: 77 IN

## 2019-09-16 DIAGNOSIS — I10 BENIGN ESSENTIAL HYPERTENSION: ICD-10-CM

## 2019-09-16 DIAGNOSIS — F17.200 TOBACCO USE DISORDER: ICD-10-CM

## 2019-09-16 DIAGNOSIS — E78.5 HYPERLIPIDEMIA LDL GOAL <160: ICD-10-CM

## 2019-09-16 DIAGNOSIS — F17.200 TOBACCO USE DISORDER: Primary | ICD-10-CM

## 2019-09-16 PROCEDURE — G0297 LDCT FOR LUNG CA SCREEN: HCPCS

## 2019-09-16 PROCEDURE — 99213 OFFICE O/P EST LOW 20 MIN: CPT | Performed by: INTERNAL MEDICINE

## 2019-09-16 ASSESSMENT — MIFFLIN-ST. JEOR: SCORE: 2032.84

## 2019-09-16 NOTE — PROGRESS NOTES
"Subjective     Ricki Acuna is a 58 year old male who presents to clinic today for the following health issues:    HPI   Recheck medication    Here to discuss statin therapy for primary prevention  No new symptoms   Patient Active Problem List   Diagnosis     Benign essential hypertension     Adenomatous colon polyp     Hyperlipidemia LDL goal <160     Tobacco use disorder     Past Surgical History:   Procedure Laterality Date     CHOLECYSTECTOMY      open     COLONOSCOPY  3/20/15    hx polyps     COLONOSCOPY N/A 3/20/2015    Procedure: COMBINED COLONOSCOPY, SINGLE OR MULTIPLE BIOPSY/POLYPECTOMY BY BIOPSY;  Surgeon: Chun Joe MD;  Location:  GI     HERNIA REPAIR      inguinal hernia repair       Social History     Tobacco Use     Smoking status: Current Every Day Smoker     Packs/day: 0.50     Types: Cigarettes     Smokeless tobacco: Never Used   Substance Use Topics     Alcohol use: Yes     Alcohol/week: 4.2 oz     Types: 7 Glasses of wine per week     Comment: few drinks per week     Family History   Problem Relation Age of Onset     Prostate Cancer Father      Cerebrovascular Disease Mother      Diabetes Mother          Current Outpatient Medications   Medication Sig Dispense Refill     aspirin 81 MG tablet Take by mouth daily 30 tablet      lisinopril-hydrochlorothiazide (PRINZIDE/ZESTORETIC) 20-12.5 MG tablet TAKE 1 TABLET BY MOUTH DAILY 90 tablet 1     Allergies   Allergen Reactions     No Known Allergies          Reviewed and updated as needed this visit by Provider         Review of Systems   ROS COMP:       Objective    /73 (BP Location: Right arm, Cuff Size: Adult Large)   Pulse 79   Temp 97.6  F (36.4  C) (Oral)   Ht 1.956 m (6' 5\")   Wt 108.5 kg (239 lb 4.8 oz)   SpO2 98%   BMI 28.38 kg/m    Body mass index is 28.38 kg/m .  Physical Exam   Well appearing             Assessment & Plan       ICD-10-CM    1. Tobacco use disorder F17.200    2. Benign essential hypertension I10    3. " "Hyperlipidemia LDL goal <160 E78.5        Counseled on risks and benefits of statin therapy for primary prevention for which he is a candidate  Blood pressure better today in clinic  Discussed strategies for smoking cessation  He would prefer to work on quieting smoking rather than starting statin therapy  Schedule follow up in 3 months to check on status and discuss atorvastatin (Lipitor) if needed    Total face to face contact time was greater than 15 minutes of which more than 50% of this time was spent counseling and coordinating care regarding the above topics.    Tobacco Cessation:   reports that he has been smoking cigarettes.  He has been smoking about 0.50 packs per day. He has never used smokeless tobacco.  Tobacco Cessation Action Plan: Self help information given to patient      BMI:   Estimated body mass index is 28.38 kg/m  as calculated from the following:    Height as of this encounter: 1.956 m (6' 5\").    Weight as of this encounter: 108.5 kg (239 lb 4.8 oz).   Weight management plan: Discussed healthy diet and exercise guidelines            Return in about 3 months (around 12/16/2019) for cholesterol discussion .    Bryan Fragoso MD  [facility]      "

## 2019-09-23 ENCOUNTER — HOSPITAL ENCOUNTER (OUTPATIENT)
Facility: CLINIC | Age: 59
Discharge: HOME | End: 2019-09-23
Attending: COLON & RECTAL SURGERY | Admitting: COLON & RECTAL SURGERY
Payer: COMMERCIAL

## 2019-09-23 VITALS
DIASTOLIC BLOOD PRESSURE: 88 MMHG | HEART RATE: 78 BPM | SYSTOLIC BLOOD PRESSURE: 120 MMHG | WEIGHT: 239 LBS | RESPIRATION RATE: 11 BRPM | OXYGEN SATURATION: 96 % | HEIGHT: 77 IN | BODY MASS INDEX: 28.22 KG/M2

## 2019-09-23 LAB — COLONOSCOPY: NORMAL

## 2019-09-23 PROCEDURE — 25000128 H RX IP 250 OP 636: Performed by: COLON & RECTAL SURGERY

## 2019-09-23 PROCEDURE — 45385 COLONOSCOPY W/LESION REMOVAL: CPT | Mod: PT | Performed by: COLON & RECTAL SURGERY

## 2019-09-23 PROCEDURE — G0500 MOD SEDAT ENDO SERVICE >5YRS: HCPCS | Performed by: COLON & RECTAL SURGERY

## 2019-09-23 PROCEDURE — 88305 TISSUE EXAM BY PATHOLOGIST: CPT | Performed by: COLON & RECTAL SURGERY

## 2019-09-23 PROCEDURE — 88305 TISSUE EXAM BY PATHOLOGIST: CPT | Mod: 26 | Performed by: COLON & RECTAL SURGERY

## 2019-09-23 RX ORDER — FENTANYL CITRATE 50 UG/ML
INJECTION, SOLUTION INTRAMUSCULAR; INTRAVENOUS PRN
Status: DISCONTINUED | OUTPATIENT
Start: 2019-09-23 | End: 2019-09-23 | Stop reason: HOSPADM

## 2019-09-23 RX ORDER — ONDANSETRON 2 MG/ML
4 INJECTION INTRAMUSCULAR; INTRAVENOUS
Status: DISCONTINUED | OUTPATIENT
Start: 2019-09-23 | End: 2019-09-23 | Stop reason: HOSPADM

## 2019-09-23 RX ORDER — LIDOCAINE 40 MG/G
CREAM TOPICAL
Status: DISCONTINUED | OUTPATIENT
Start: 2019-09-23 | End: 2019-09-23 | Stop reason: HOSPADM

## 2019-09-23 ASSESSMENT — MIFFLIN-ST. JEOR: SCORE: 2031.48

## 2019-09-23 NOTE — H&P
Colon & Rectal Surgery History and Physical  Pre-Endoscopy Procedure Note    History of Present Illness   I have been asked by Dr. Fragoso to evaluate this 58 year old male for colorectal polyp surveillance. He had two adenomatous polyps removed during colonoscopy in March 2015.  He currently denies any abdominal pain, weight loss, bleeding per rectum, or recent change in bowel habits.    Past Medical History  Diagnosis Date     Adenomatous colon polyp 3-20-15    repeat in 3 years      Hypertension      Skin cancer     Dr. Antoine Aguirre MD DERMATOLOGY SPECIALIST        Past Surgical History  Procedure Laterality Date     CHOLECYSTECTOMY      open     COLONOSCOPY  3/20/15    hx polyps     HERNIA REPAIR      inguinal hernia repair        Medications  Medication Sig     aspirin 81 MG tablet Take by mouth daily     lisinopril-hydrochlorothiazide (PRINZIDE/ZESTORETIC) 20-12.5 MG tablet TAKE 1 TABLET BY MOUTH DAILY       Allergies     Allergies   Allergen Reactions     No Known Allergies         Family History   Family history includes Cerebrovascular Disease in his mother; Diabetes in his mother; Prostate Cancer in his father.     Social History    He reports that he has been smoking cigarettes. He has been smoking about 0.50 packs per day. He has never used smokeless tobacco. He reports current alcohol use of about 7.0 standard drinks of alcohol per week. He reports that he does not use drugs.    Review of Systems   Constitutional:  No fever, weight change or fatigue.    Eyes:     No dry eyes or vision changes.   Ears/Nose/Throat/Neck:  No oral ulcers, sore throat or voice change.    Cardiovascular:   No palpitations, syncope, angina or edema.   Respiratory:    No chest pain, excessive sleepiness, shortness of breath or hemoptysis.    Gastrointestinal:   No abdominal pain, nausea, vomiting, diarrhea or heartburn.    Genitourinary:   No dysuria, hematuria, urinary retention or urinary frequency.   Musculoskeletal:  No  "joint swelling or arthralgias.    Dermatologic:  No skin rash or other skin changes.   Neurologic:    No focal weakness or numbness. No neuropathy.   Psychiatric:    No depression, anxiety, suicidal ideation, or paranoid ideation.   Endocrine:   No cold or heat intolerance, polydipsia, hirsutism, change in libido, or flushing.   Hematology/Lymphatic:  No bleeding or lymphadenopathy.    Allergy/Immunology:  No rhinitis or hives.     Physical Exam   Vitals:  BP (!) 131/101, HR 89, height 1.956 m (6' 5\"), weight 108.4 kg (239 lb), SpO2 99 %.    General:  Alert and oriented to person, place and time   Airway: Normal oropharyngeal airway and neck mobility   Lungs:  Clear bilaterally   Heart:  Regular rate and rhythm   Abdomen: Soft, NT, ND, no masses   Rectal:  Perianal skin without excoriation, hemorrhoidal disease or anal fissure        Digital rectal examination reveals normal sphincter tone without masses    ASA Grade: II (mild systemic disease)    Impression: Cleared for use of conscious sedation for colorectal polyp surveillance    Plan: Proceed with colonoscopy     Rocio Stahl MD, MD  [facility]  [phone]  "

## 2019-09-24 LAB — COPATH REPORT: NORMAL

## 2020-01-06 ENCOUNTER — OFFICE VISIT (OUTPATIENT)
Dept: FAMILY MEDICINE | Facility: CLINIC | Age: 60
End: 2020-01-06
Payer: COMMERCIAL

## 2020-01-06 VITALS
SYSTOLIC BLOOD PRESSURE: 123 MMHG | BODY MASS INDEX: 31.81 KG/M2 | HEART RATE: 61 BPM | DIASTOLIC BLOOD PRESSURE: 83 MMHG | OXYGEN SATURATION: 99 % | WEIGHT: 240 LBS | HEIGHT: 73 IN | TEMPERATURE: 96.8 F

## 2020-01-06 DIAGNOSIS — D12.6 ADENOMATOUS POLYP OF COLON, UNSPECIFIED PART OF COLON: ICD-10-CM

## 2020-01-06 DIAGNOSIS — I10 BENIGN ESSENTIAL HYPERTENSION: ICD-10-CM

## 2020-01-06 DIAGNOSIS — F17.200 TOBACCO USE DISORDER: Primary | ICD-10-CM

## 2020-01-06 DIAGNOSIS — E78.5 HYPERLIPIDEMIA LDL GOAL <160: ICD-10-CM

## 2020-01-06 PROCEDURE — 99213 OFFICE O/P EST LOW 20 MIN: CPT | Performed by: INTERNAL MEDICINE

## 2020-01-06 ASSESSMENT — MIFFLIN-ST. JEOR: SCORE: 1969.09

## 2020-01-06 NOTE — PROGRESS NOTES
"Subjective     Rickikenneth Acuna is a 59 year old male who presents to clinic today for the following health issues:    HPI       Chief Complaint   Patient presents with     Lipids     check       Pleasant 59-year-old former smoker who quit about 6 days ago for a New Year's resolution and has been successful over the last 6 days returns in follow-up regarding his hyperlipidemia, hypertension, history of adenomatous polyps, and recent tobacco use.  His 10-year atherosclerotic coronary vascular disease risk was elevated to the point where statin therapy would be recommended when it was last checked about 3 months ago.  At the time, he was very reluctant to consider additional medications.  He vowed to quit smoking in order to reduce his statistical risk for atherosclerotic coronary vascular disease.  We agreed to this plan provided that he had appropriate follow-up to keep him on track with his goals for tobacco cessation.  He has been successful as stated above.  He intends to exercise more to help with weight gain that he has experienced in the past when he tried to quit smoking.  He plans to do this with his wife.          Reviewed and updated as needed this visit by Provider         Review of Systems         Objective    /83 (BP Location: Right arm, Patient Position: Sitting, Cuff Size: Adult Large)   Pulse 61   Temp 96.8  F (36  C) (Oral)   Ht 1.857 m (6' 1.1\")   Wt 108.9 kg (240 lb)   SpO2 99%   BMI 31.58 kg/m    Body mass index is 31.58 kg/m .  Physical Exam   Well-appearing man in no acute distress            Assessment & Plan     1. Tobacco use disorder  Congratulated patient on quitting successfully 6 days ago, brainstormed strategies for maintaining tobacco cessation    2. Benign essential hypertension  Blood pressure under good control on current medications, continue current drugs    3. Adenomatous polyp of colon, unspecified part of colon  Recent colonoscopy without adenomatous tissue    4. " Hyperlipidemia LDL goal <160  Discussed elevated atherosclerotic coronary vascular disease risk and thresholds for treatment with statin therapy, even with tobacco cessation, he is still a candidate for statin therapy and this was discussed, however, he is very adamant about avoiding additional medications understanding the risk             Return in about 7 months (around 8/21/2020) for Preventive Visit.    Bryan Fragoso MD  [facility]    Total face to face contact time was greater than 15 minutes of which more than 50% of this time was spent counseling and coordinating care regarding the above topics.

## 2020-02-16 DIAGNOSIS — I10 BENIGN ESSENTIAL HYPERTENSION: ICD-10-CM

## 2020-02-17 NOTE — TELEPHONE ENCOUNTER
"lisinopril-hydrochlorothiazide (ZESTORETIC) 20-12.5 MG tablet     Last Written Prescription Date:  8/20/2019  Last Fill Quantity: 90,  # refills: 1   Last office visit: 1/6/2020 with prescribing provider:     Future Office Visit:  unknown    Requested Prescriptions   Pending Prescriptions Disp Refills     lisinopril-hydrochlorothiazide (ZESTORETIC) 20-12.5 MG tablet [Pharmacy Med Name: LISINOPRIL-HCTZ 20/12.5MG TABLETS] 90 tablet 1     Sig: TAKE 1 TABLET BY MOUTH DAILY       Diuretics (Including Combos) Protocol Passed - 2/16/2020 12:58 PM        Passed - Blood pressure under 140/90 in past 12 months     BP Readings from Last 3 Encounters:   01/06/20 123/83   09/23/19 120/88   09/16/19 123/73                 Passed - Recent (12 mo) or future (30 days) visit within the authorizing provider's specialty     Patient has had an office visit with the authorizing provider or a provider within the authorizing providers department within the previous 12 mos or has a future within next 30 days. See \"Patient Info\" tab in inbasket, or \"Choose Columns\" in Meds & Orders section of the refill encounter.              Passed - Medication is active on med list        Passed - Patient is age 18 or older        Passed - Normal serum creatinine on file in past 12 months     Recent Labs   Lab Test 08/30/19 0717   CR 0.74              Passed - Normal serum potassium on file in past 12 months     Recent Labs   Lab Test 08/30/19 0717   POTASSIUM 4.0                    Passed - Normal serum sodium on file in past 12 months     Recent Labs   Lab Test 08/30/19 0717                   "

## 2020-02-18 RX ORDER — LISINOPRIL AND HYDROCHLOROTHIAZIDE 12.5; 2 MG/1; MG/1
TABLET ORAL
Qty: 90 TABLET | Refills: 1 | Status: SHIPPED | OUTPATIENT
Start: 2020-02-18 | End: 2020-08-19

## 2020-02-18 NOTE — TELEPHONE ENCOUNTER
Prescription approved per [facility] Refill Protocol.  Stacie Weber RN

## 2020-08-18 DIAGNOSIS — I10 BENIGN ESSENTIAL HYPERTENSION: ICD-10-CM

## 2020-08-19 RX ORDER — LISINOPRIL AND HYDROCHLOROTHIAZIDE 12.5; 2 MG/1; MG/1
TABLET ORAL
Qty: 90 TABLET | Refills: 0 | Status: SHIPPED | OUTPATIENT
Start: 2020-08-19 | End: 2020-08-31

## 2020-08-19 NOTE — TELEPHONE ENCOUNTER
"Last Written Prescription Date:  2/18/2020  Last Fill Quantity: 90,  # refills: 1   Last office visit: 1/6/2020 with prescribing provider:  Yes, PCP   Future Office Visit:   Next 5 appointments (look out 90 days)    Aug 31, 2020  8:00 AM CDT  PHYSICAL with [facility]agoso MD  [facility] [address] [facility] [id number] [phone]         Prescription approved per [facility] Refill Protocol.  Stacie Weber RN        Requested Prescriptions   Pending Prescriptions Disp Refills     lisinopril-hydrochlorothiazide (ZESTORETIC) 20-12.5 MG tablet [Pharmacy Med Name: LISINOPRIL-HCTZ 20/12.5MG TABLETS] 90 tablet 1     Sig: TAKE 1 TABLET BY MOUTH DAILY       Diuretics (Including Combos) Protocol Passed - 8/18/2020  7:55 AM        Passed - Blood pressure under 140/90 in past 12 months     BP Readings from Last 3 Encounters:   01/06/20 123/83   09/23/19 120/88   09/16/19 123/73                 Passed - Recent (12 mo) or future (30 days) visit within the authorizing provider's specialty     Patient has had an office visit with the authorizing provider or a provider within the authorizing providers department within the previous 12 mos or has a future within next 30 days. See \"Patient Info\" tab in inbasket, or \"Choose Columns\" in Meds & Orders section of the refill encounter.              Passed - Medication is active on med list        Passed - Patient is age 18 or older        Passed - Normal serum creatinine on file in past 12 months     Recent Labs   Lab Test 08/30/19  0717   CR 0.74              Passed - Normal serum potassium on file in past 12 months     Recent Labs   Lab Test 08/30/19  0717   POTASSIUM 4.0                    Passed - Normal serum sodium on file in past 12 months     Recent Labs   Lab Test 08/30/19  0717                ACE Inhibitors (Including Combos) Protocol Passed - 8/18/2020  7:55 AM        Passed - Blood pressure under 140/90 in past 12 months     BP " "Readings from Last 3 Encounters:   01/06/20 123/83   09/23/19 120/88   09/16/19 123/73                 Passed - Recent (12 mo) or future (30 days) visit within the authorizing provider's specialty     Patient has had an office visit with the authorizing provider or a provider within the authorizing providers department within the previous 12 mos or has a future within next 30 days. See \"Patient Info\" tab in inbasket, or \"Choose Columns\" in Meds & Orders section of the refill encounter.              Passed - Medication is active on med list        Passed - Patient is age 18 or older        Passed - Normal serum creatinine on file in past 12 months     Recent Labs   Lab Test 08/30/19  0717   CR 0.74       Ok to refill medication if creatinine is low          Passed - Normal serum potassium on file in past 12 months     Recent Labs   Lab Test 08/30/19  0717   POTASSIUM 4.0                "

## 2020-08-31 ENCOUNTER — OFFICE VISIT (OUTPATIENT)
Dept: FAMILY MEDICINE | Facility: CLINIC | Age: 60
End: 2020-08-31
Payer: COMMERCIAL

## 2020-08-31 VITALS
SYSTOLIC BLOOD PRESSURE: 126 MMHG | DIASTOLIC BLOOD PRESSURE: 82 MMHG | TEMPERATURE: 97.1 F | HEIGHT: 73 IN | BODY MASS INDEX: 35.92 KG/M2 | HEART RATE: 71 BPM | OXYGEN SATURATION: 97 % | WEIGHT: 271 LBS

## 2020-08-31 DIAGNOSIS — Z12.5 PROSTATE CANCER SCREENING: ICD-10-CM

## 2020-08-31 DIAGNOSIS — E66.01 MORBID OBESITY (H): ICD-10-CM

## 2020-08-31 DIAGNOSIS — Z00.00 ROUTINE GENERAL MEDICAL EXAMINATION AT A HEALTH CARE FACILITY: Primary | ICD-10-CM

## 2020-08-31 DIAGNOSIS — E78.5 HYPERLIPIDEMIA LDL GOAL <100: ICD-10-CM

## 2020-08-31 DIAGNOSIS — I10 BENIGN ESSENTIAL HYPERTENSION: ICD-10-CM

## 2020-08-31 DIAGNOSIS — L57.0 ACTINIC KERATOSIS: ICD-10-CM

## 2020-08-31 DIAGNOSIS — R74.8 ELEVATED LIVER ENZYMES: ICD-10-CM

## 2020-08-31 DIAGNOSIS — R73.01 IMPAIRED FASTING GLUCOSE: ICD-10-CM

## 2020-08-31 DIAGNOSIS — Z87.891 PERSONAL HISTORY OF TOBACCO USE: ICD-10-CM

## 2020-08-31 LAB
ALBUMIN SERPL-MCNC: 3.9 G/DL (ref 3.4–5)
ALP SERPL-CCNC: 110 U/L (ref 40–150)
ALT SERPL W P-5'-P-CCNC: 81 U/L (ref 0–70)
ANION GAP SERPL CALCULATED.3IONS-SCNC: 6 MMOL/L (ref 3–14)
AST SERPL W P-5'-P-CCNC: 32 U/L (ref 0–45)
BILIRUB SERPL-MCNC: 0.6 MG/DL (ref 0.2–1.3)
BUN SERPL-MCNC: 11 MG/DL (ref 7–30)
CALCIUM SERPL-MCNC: 8.9 MG/DL (ref 8.5–10.1)
CHLORIDE SERPL-SCNC: 105 MMOL/L (ref 94–109)
CHOLEST SERPL-MCNC: 191 MG/DL
CO2 SERPL-SCNC: 26 MMOL/L (ref 20–32)
CREAT SERPL-MCNC: 0.79 MG/DL (ref 0.66–1.25)
ERYTHROCYTE [DISTWIDTH] IN BLOOD BY AUTOMATED COUNT: 13 % (ref 10–15)
GFR SERPL CREATININE-BSD FRML MDRD: >90 ML/MIN/{1.73_M2}
GLUCOSE SERPL-MCNC: 113 MG/DL (ref 70–99)
HCT VFR BLD AUTO: 42.8 % (ref 40–53)
HDLC SERPL-MCNC: 31 MG/DL
HGB BLD-MCNC: 14.2 G/DL (ref 13.3–17.7)
LDLC SERPL CALC-MCNC: 125 MG/DL
MCH RBC QN AUTO: 30.6 PG (ref 26.5–33)
MCHC RBC AUTO-ENTMCNC: 33.2 G/DL (ref 31.5–36.5)
MCV RBC AUTO: 92 FL (ref 78–100)
NONHDLC SERPL-MCNC: 160 MG/DL
PLATELET # BLD AUTO: 315 10E9/L (ref 150–450)
POTASSIUM SERPL-SCNC: 3.9 MMOL/L (ref 3.4–5.3)
PROT SERPL-MCNC: 7.4 G/DL (ref 6.8–8.8)
PSA SERPL-ACNC: 1.51 UG/L (ref 0–4)
RBC # BLD AUTO: 4.64 10E12/L (ref 4.4–5.9)
SODIUM SERPL-SCNC: 137 MMOL/L (ref 133–144)
TRIGL SERPL-MCNC: 174 MG/DL
WBC # BLD AUTO: 6.3 10E9/L (ref 4–11)

## 2020-08-31 PROCEDURE — 80053 COMPREHEN METABOLIC PANEL: CPT | Performed by: INTERNAL MEDICINE

## 2020-08-31 PROCEDURE — 36415 COLL VENOUS BLD VENIPUNCTURE: CPT | Performed by: INTERNAL MEDICINE

## 2020-08-31 PROCEDURE — 80061 LIPID PANEL: CPT | Performed by: INTERNAL MEDICINE

## 2020-08-31 PROCEDURE — 85027 COMPLETE CBC AUTOMATED: CPT | Performed by: INTERNAL MEDICINE

## 2020-08-31 PROCEDURE — G0103 PSA SCREENING: HCPCS | Performed by: INTERNAL MEDICINE

## 2020-08-31 PROCEDURE — 17000 DESTRUCT PREMALG LESION: CPT | Performed by: INTERNAL MEDICINE

## 2020-08-31 PROCEDURE — 17003 DESTRUCT PREMALG LES 2-14: CPT | Performed by: INTERNAL MEDICINE

## 2020-08-31 PROCEDURE — 99396 PREV VISIT EST AGE 40-64: CPT | Mod: 25 | Performed by: INTERNAL MEDICINE

## 2020-08-31 PROCEDURE — G0296 VISIT TO DETERM LDCT ELIG: HCPCS | Performed by: INTERNAL MEDICINE

## 2020-08-31 RX ORDER — LISINOPRIL AND HYDROCHLOROTHIAZIDE 12.5; 2 MG/1; MG/1
1 TABLET ORAL DAILY
Qty: 90 TABLET | Refills: 3 | Status: SHIPPED | OUTPATIENT
Start: 2020-08-31 | End: 2021-09-13

## 2020-08-31 ASSESSMENT — MIFFLIN-ST. JEOR: SCORE: 2109.71

## 2020-08-31 NOTE — PATIENT INSTRUCTIONS
"Please call [facility] at [phone] to schedule your LUNG CANCER SCREENING CT.    You should get the new shingles vaccine series \"SHINGRIX\" (not Zostavax) at a pharmacy.       Lung Cancer Screening   Frequently Asked Questions  If you are at high-risk for lung cancer, getting screened with low-dose computed tomography (LDCT) every year can help save your life. This handout offers answers to some of the most common questions about lung cancer screening. If you have other questions, please call [phone] ([phone]).     What is it?  Lung cancer screening uses special X-ray technology to create an image of your lung tissue. The exam is quick and easy and takes less than 10 seconds. We don t give you any medicine or use any needles. You can eat before and after the exam. You don t need to change your clothes as long as the clothing on your chest doesn t contain metal. But, you do need to be able to hold your breath for at least 6 seconds during the exam.    What is the goal of lung cancer screening?  The goal of lung cancer screening is to save lives. Many times, lung cancer is not found until a person starts having physical symptoms. Lung cancer screening can help detect lung cancer in the earliest stages when it may be easier to treat.    Who should be screened for lung cancer?  We suggest lung cancer screening for anyone who is at high-risk for lung cancer. You are in the high-risk group if you:      are between the ages of 55 and 79, and    have smoked at least 1 pack of cigarettes a day for 30 or more years, and    still smoke or have quit within the past 15 years.    However, if you have a new cough or shortness of breath, you should talk to your doctor before being screened.    Some national lung health advocacy groups also recommend screening for people ages 50 to 79 who have smoked an average of 1 pack of cigarettes a day for 20 years. They must also have at least 1 other risk factor " for lung cancer, not including exposure to secondhand smoke. Other risk factors are having had cancer in the past, emphysema, pulmonary fibrosis, COPD, a family history of lung cancer, or exposure to certain materials such as arsenic, asbestos, beryllium, cadmium, chromium, diesel fumes, nickel, radon or silica. Your care team can help you know if you have one of these risk factors.     Why does it matter if I have symptoms?  Certain symptoms can be a sign that you have a condition in your lungs that should be checked and treated by your doctor. These symptoms include fever, chest pain, a new or changing cough, shortness of breath that you have never felt before, coughing up blood or unexplained weight loss. Having any of these symptoms can greatly affect the results of lung cancer screening.       Should all smokers get an LDCT lung cancer screening exam?  It depends. Lung cancer screening is for a very specific group of men and women who have a history of heavy smoking over a long period of time (see  Who should be screened for lung cancer  above).  I am in the high-risk group, but have been diagnosed with cancer in the past. Is LDCT lung cancer screening right for me?  In some cases, you should not have LDCT lung screening, such as when your doctor is already following your cancer with CT scan studies. Your doctor will help you decide if LDCT lung screening is right for you.  Do I need to have a screening exam every year?  Yes. If you are in the high-risk group described earlier, you should get an LDCT lung cancer screening exam every year until you are 79, or are no longer willing or able to undergo screening and possible procedures to diagnose and treat lung cancer.  How effective is LDCT at preventing death from lung cancer?  Studies have shown that LDCT lung cancer screening can lower the risk of death from lung cancer by 20 percent in people who are at high-risk.  What are the risks?  There are some risks  and limitations of LDCT lung cancer screening. We want to make sure you understand the risks and benefits, so please let us know if you have any questions. Your doctor may want to talk with you more about these risks.    Radiation exposure: As with any exam that uses radiation, there is a very small increased risk of cancer. The amount of radiation in LDCT is small--about the same amount a person would get from a mammogram. Your doctor orders the exam when he or she feels the potential benefits outweigh the risks.    False negatives: No test is perfect, including LDCT. It is possible that you may have a medical condition, including lung cancer, that is not found during your exam. This is called a false negative result.    False positives and more testing: LDCT very often finds something in the lung that could be cancer, but in fact is not. This is called a false positive result. False positive tests often cause anxiety. To make sure these findings are not cancer, you may need to have more tests. These tests will be done only if you give us permission. Sometimes patients need a treatment that can have side effects, such as a biopsy. For more information on false positives, see  What can I expect from the results?     Findings not related to lung cancer: Your LDCT exam also takes pictures of areas of your body next to your lungs. In a very small number of cases, the CT scan will show an abnormal finding in one of these areas, such as your kidneys, adrenal glands, liver or thyroid. This finding may not be serious, but you may need more tests. Your doctor can help you decide what other tests you may need, if any.  What can I expect from the results?  About 1 out of 4 LDCT exams will find something that may need more tests. Most of the time, these findings are lung nodules. Lung nodules are very small collections of tissue in the lung. These nodules are very common, and the vast majority--more than 97 percent--are not  cancer (benign). Most are normal lymph nodes or small areas of scarring from past infections.  But, if a small lung nodule is found to be cancer, the cancer can be cured more than 90 percent of the time. To know if the nodule is cancer, we may need to get more images before your next yearly screening exam. If the nodule has suspicious features (for example, it is large, has an odd shape or grows over time), we will refer you to a specialist for further testing.  Will my doctor also get the results?  Yes. Your doctor will get a copy of your results.  Is it okay to keep smoking now that there s a cancer screening exam?  No. Tobacco is one of the strongest cancer-causing agents. It causes not only lung cancer, but other cancers and cardiovascular (heart) diseases as well. The damage caused by smoking builds over time. This means that the longer you smoke, the higher your risk of disease. While it is never too late to quit, the sooner you quit, the better.  Where can I find help to quit smoking?  The best way to prevent lung cancer is to stop smoking. If you have already quit smoking, congratulations and keep it up! For help on quitting smoking, please call [employer] at [phone] ([phone] or the American [employer] Society at 1-657.293.6758 to find local resources near you.  One-on-one health coaching:  If you d prefer to work individually with a health care provider on tobacco cessation, we offer:      Medication Therapy Management:  Our specially trained pharmacists work closely with you and your doctor to help you quit smoking.  Call [phone] or [phone] (toll free).     Can Do: Health coaching offered by [facility].  [email or web address]

## 2020-08-31 NOTE — LETTER
"September 1, 2020      Ricki Jose Acuna  [address] [place] [address]        Dear ,    The following letter pertains to your most recent diagnostic tests:     -Your complete blood counts including your hemoglobin returned normal for you.       -Your prostate specific antigen (PSA) test result returned normal.     -Your total cholesterol is 191 which is at your goal of total cholesterol less than 200.     -Your triglycerides are 174 which are above your goal of triglycerides less than 150.     -Your HDL or \"good cholesterol\" is 31 which is below your goal of HDL cholesterol greater than 40.     -Your LDL cholesterol or \"bad cholesterol\" is 125 which is at your goal of LDL cholesterol less than <130, but increased since last check when it was 120.  Your LDL goal is based on your risk factors for artery disease.       -Liver and gallbladder tests are normal for you. (AST, Alk phos, bilirubin) with the exception of mild elevation in ALT, kidney function is normal for you (Creatinine, GFR), Sodium is normal, Potassium is normal for you, Calcium is normal for you, Glucose (blood sugar) is elevated, but not in the diabetic range.               Bottom line:  Your lab results look a little worse than last check.  I think weight loss could improve your lab results.  Loosing weight will decrease triglycerides, fasting blood sugar and the liver test (the liver test is most likely high due to fat accumulation in the liver).  Increasing physical activity will increase 'good cholesterol' (HDL).   Eating less meat and fried food and eating more fruits and vegetables and high fiber foods will decrease 'bad cholesterol' (LDL).       I recommend you work very hard at these things over the next 3 months and recheck your labs at that interval.   You can schedule a lab appointment for that purpose.             Follow up:  Lab appointment in 3 months to recheck labs.  Return sooner as needed.         Sincerely,     Dr." Fouzia     Resulted Orders   Prostate spec antigen screen   Result Value Ref Range    PSA 1.51 0 - 4 ug/L      Comment:      Assay Method:  Chemiluminescence using Siemens Vista analyzer   Lipid panel reflex to direct LDL Fasting   Result Value Ref Range    Cholesterol 191 <200 mg/dL    Triglycerides 174 (H) <150 mg/dL      Comment:      Borderline high:  150-199 mg/dl  High:             200-499 mg/dl  Very high:       >499 mg/dl  Fasting specimen      HDL Cholesterol 31 (L) >39 mg/dL    LDL Cholesterol Calculated 125 (H) <100 mg/dL      Comment:      Above desirable:  100-129 mg/dl  Borderline High:  130-159 mg/dL  High:             160-189 mg/dL  Very high:       >189 mg/dl      Non HDL Cholesterol 160 (H) <130 mg/dL      Comment:      Above Desirable:  130-159 mg/dl  Borderline high:  160-189 mg/dl  High:             190-219 mg/dl  Very high:       >219 mg/dl     Comprehensive metabolic panel   Result Value Ref Range    Sodium 137 133 - 144 mmol/L    Potassium 3.9 3.4 - 5.3 mmol/L    Chloride 105 94 - 109 mmol/L    Carbon Dioxide 26 20 - 32 mmol/L    Anion Gap 6 3 - 14 mmol/L    Glucose 113 (H) 70 - 99 mg/dL      Comment:      Fasting specimen    Urea Nitrogen 11 7 - 30 mg/dL    Creatinine 0.79 0.66 - 1.25 mg/dL    GFR Estimate >90 >60 mL/min/[1.73_m2]      Comment:      Non  GFR Calc  Starting 12/18/2018, serum creatinine based estimated GFR (eGFR) will be   calculated using the Chronic Kidney Disease Epidemiology Collaboration   (CKD-EPI) equation.      GFR Estimate If Black >90 >60 mL/min/[1.73_m2]      Comment:       GFR Calc  Starting 12/18/2018, serum creatinine based estimated GFR (eGFR) will be   calculated using the Chronic Kidney Disease Epidemiology Collaboration   (CKD-EPI) equation.      Calcium 8.9 8.5 - 10.1 mg/dL    Bilirubin Total 0.6 0.2 - 1.3 mg/dL    Albumin 3.9 3.4 - 5.0 g/dL    Protein Total 7.4 6.8 - 8.8 g/dL    Alkaline Phosphatase 110 40 - 150 U/L    ALT 81  (H) 0 - 70 U/L    AST 32 0 - 45 U/L   CBC with platelets   Result Value Ref Range    WBC 6.3 4.0 - 11.0 10e9/L    RBC Count 4.64 4.4 - 5.9 10e12/L    Hemoglobin 14.2 13.3 - 17.7 g/dL    Hematocrit 42.8 40.0 - 53.0 %    MCV 92 78 - 100 fl    MCH 30.6 26.5 - 33.0 pg    MCHC 33.2 31.5 - 36.5 g/dL    RDW 13.0 10.0 - 15.0 %    Platelet Count 315 150 - 450 10e9/L

## 2020-08-31 NOTE — PROGRESS NOTES
SUBJECTIVE:   CC: Ricki Acuna is an 59 year old male who presents for preventative health visit.     Healthy Habits:    Getting at least 3 servings of Calcium per day:  Yes    Bi-annual eye exam:  NO    Dental care twice a year:  NO    Sleep apnea or symptoms of sleep apnea:  None    Diet:  Regular (no restrictions)    Frequency of exercise:  6-7 days/week    Duration of exercise:  15-30 minutes    Taking medications regularly:  No    Barriers to taking medications:  None    Medication side effects:  None    PHQ-2 Total Score:    Additional concerns today:  No    Skin lesions on forehead present for several months not painful or itchy     Today's PHQ-2 Score:   PHQ-2 (  [employer]) 2020   Q1: Little interest or pleasure in doing things 0   Q2: Feeling down, depressed or hopeless 0   PHQ-2 Score 0       Abuse: Current or Past(Physical, Sexual or Emotional)- No  Do you feel safe in your environment? Yes    Have you ever done Advance Care Planning? (For example, a Health Directive, POLST, or a discussion with a medical provider or your loved ones about your wishes): No, advance care planning information given to patient to review.  Patient declined advance care planning discussion at this time.    Social History     Tobacco Use     Smoking status: Former Smoker     Packs/day: 0.50     Types: Cigarettes     Last attempt to quit: 2020     Years since quittin.6     Smokeless tobacco: Never Used   Substance Use Topics     Alcohol use: Yes     Alcohol/week: 7.0 standard drinks     Types: 7 Glasses of wine per week     Comment: 2 drinks a week     If you drink alcohol do you typically have >3 drinks per day or >7 drinks per week? No    Alcohol Use 2020   Prescreen: >3 drinks/day or >7 drinks/week? No       Last PSA:   PSA   Date Value Ref Range Status   2019 1.66 0 - 4 ug/L Final     Comment:     Assay Method:  Chemiluminescence using Siemens Vista analyzer       Reviewed orders with patient.  Reviewed health maintenance and updated orders accordingly - Yes  Patient Active Problem List   Diagnosis     Benign essential hypertension     Adenomatous colon polyp     Hyperlipidemia LDL goal <160     Tobacco use disorder     Past Surgical History:   Procedure Laterality Date     CHOLECYSTECTOMY      open     COLONOSCOPY  3/20/15    hx polyps     COLONOSCOPY N/A 3/20/2015    Procedure: COMBINED COLONOSCOPY, SINGLE OR MULTIPLE BIOPSY/POLYPECTOMY BY BIOPSY;  Surgeon: Chun Joe MD;  Location:  GI     HERNIA REPAIR      inguinal hernia repair       Social History     Tobacco Use     Smoking status: Former Smoker     Packs/day: 0.50     Types: Cigarettes     Last attempt to quit: 2020     Years since quittin.6     Smokeless tobacco: Never Used   Substance Use Topics     Alcohol use: Yes     Alcohol/week: 7.0 standard drinks     Types: 7 Glasses of wine per week     Comment: 2 drinks a week     Family History   Problem Relation Age of Onset     Prostate Cancer Father      Cerebrovascular Disease Mother      Diabetes Mother          Current Outpatient Medications   Medication Sig Dispense Refill     aspirin 81 MG tablet Take by mouth daily 30 tablet      lisinopril-hydrochlorothiazide (ZESTORETIC) 20-12.5 MG tablet TAKE 1 TABLET BY MOUTH DAILY 90 tablet 0     Allergies   Allergen Reactions     No Known Allergies        Reviewed and updated as needed this visit by clinical staff  Tobacco  Allergies  Meds  Med Hx  Surg Hx  Fam Hx  Soc Hx        Reviewed and updated as needed this visit by Provider        Past Medical History:   Diagnosis Date     Adenomatous colon polyp 3-20-15    repeat in 3 years      Hypertension      Skin cancer     Dr. Antoine Aguirre MD DERMATOLOGY SPECIALIST       Past Surgical History:   Procedure Laterality Date     CHOLECYSTECTOMY      open     COLONOSCOPY  3/20/15    hx polyps     COLONOSCOPY N/A 3/20/2015    Procedure: COMBINED COLONOSCOPY, SINGLE OR MULTIPLE  "BIOPSY/POLYPECTOMY BY BIOPSY;  Surgeon: Chun Joe MD;  Location:  GI     HERNIA REPAIR      inguinal hernia repair       Review of Systems  A 10 organ systems ROS is negative other than any pertinent positives or negatives previously stated.     OBJECTIVE:   BP (!) 131/90 (BP Location: Right arm, Patient Position: Sitting, Cuff Size: Adult Large)   Pulse 71   Temp 97.1  F (36.2  C) (Skin)   Ht 1.857 m (6' 1.1\")   Wt 122.9 kg (271 lb)   SpO2 97%   BMI 35.66 kg/m      Physical Exam  GENERAL: healthy, alert and no distress  EYES: Eyes grossly normal to inspection, PERRL and conjunctivae and sclerae normal  HENT: ear canals and TM's normal, nose and mouth without ulcers or lesions  NECK: no adenopathy, no asymmetry, masses, or scars and thyroid normal to palpation  RESP: lungs clear to auscultation - no rales, rhonchi or wheezes  CV: regular rate and rhythm, normal S1 S2, no S3 or S4, no murmur, click or rub, no peripheral edema and peripheral pulses strong  ABDOMEN: soft, nontender, no hepatosplenomegaly, no masses and bowel sounds normal  RECTAL: normal sphincter tone, no rectal masses, prostate normal size, smooth, nontender without nodules or masses  MS: no gross musculoskeletal defects noted, no edema  SKIN: 2 scaly papules on forehead consistent with actinic keratoses   NEURO: Normal strength and tone, mentation intact and speech normal  PSYCH: mentation appears normal, affect normal/bright    Labs pending     ASSESSMENT/PLAN:   1. Routine general medical examination at a health care facility      2. Morbid obesity (H)  Counseled on diet and exercise interventions to promote weight loss     3. Benign essential hypertension  Good control  - lisinopril-hydrochlorothiazide (ZESTORETIC) 20-12.5 MG tablet; Take 1 tablet by mouth daily  Dispense: 90 tablet; Refill: 3    4. Hyperlipidemia LDL goal <100  recheck    5. Prostate cancer screening    - Prostate spec antigen screen    6. Actinic " "keratosis    Liquid nitrogen was applied for 10-12 seconds to the skin lesions and the expected blistering or scabbing reaction explained. Do not pick at the areas. Patient reminded to expect hypopigmented scars from the procedure. Return if lesions fail to fully resolve.  - DESTRUC BENIGN/PREMAL,2-14 LESIONS [27688]    7. Personal history of tobacco use    - Prof Fee: Shared Decision Making Visit for Lung Cancer Screening  - CT Chest Lung Cancer Scrn Low Dose wo; Future    8. Benign essential hypertension    - lisinopril-hydrochlorothiazide (ZESTORETIC) 20-12.5 MG tablet; Take 1 tablet by mouth daily  Dispense: 90 tablet; Refill: 3    COUNSELING:   Reviewed preventive health counseling, as reflected in patient instructions  Special attention given to:        Regular exercise       Healthy diet/nutrition       Immunizations    Reminded to get shingrix, recommended flu shot             Consider Hep C screening for patients born between 1945 and 1965; negative       HIV screeninx in teen years, 1x in adult years, and at intervals if high risk; done       Colon cancer screening; hyperplastic polyps in 2019       Prostate cancer screening; PSA today       Consider lung cancer screening for ages 55-80 years and 30 pack-year smoking history  ; ;reminded to schedule screening CT    Estimated body mass index is 35.66 kg/m  as calculated from the following:    Height as of this encounter: 1.857 m (6' 1.1\").    Weight as of this encounter: 122.9 kg (271 lb).     Weight management plan: Discussed healthy diet and exercise guidelines    He reports that he quit smoking about 7 months ago. His smoking use included cigarettes. He smoked 0.50 packs per day. He has never used smokeless tobacco.      Counseling Resources:  ATP IV Guidelines  Pooled Cohorts Equation Calculator  FRAX Risk Assessment  ICSI Preventive Guidelines  Dietary Guidelines for Americans,   USDA's MyPlate  ASA Prophylaxis  Lung CA Screening    Bryan TERESA " MD Fouzia  [facility]  Lung Cancer Screening Shared Decision Making Visit     Ricki Acuna is eligible for lung cancer screening on the basis of the information provided in my signed lung cancer screening order.     I have discussed with patient the risks and benefits of screening for lung cancer with low-dose CT.     The risks include:  radiation exposure: one low dose chest CT has as much ionizing radiation as about 15 chest x-rays or 6 months of background radiation living in [place]    false positives: 96% of positive findings/nodules are NOT cancer, but some might still require additional diagnostic evaluation, including biopsy  over-diagnosis: some slow growing cancers that might never have been clinically significant will be detected and treated unnecessarily     The benefit of early detection of lung cancer is contingent upon adherence to annual screening or more frequent follow up if indicated.     Furthermore, reaping the benefits of screening requires Ricki Acuna to be willing and physically able to undergo diagnostic procedures, if indicated. Although no specific guide is available for determining severity of comorbidities, it is reasonable to withhold screening in patients who have greater mortality risk from other diseases.     We did discuss that the only way to prevent lung cancer is to not smoke. Smoking cessation counseling was not given.      I did not offer risk estimation using a calculator such as this one:    ShouldIScreen

## 2020-09-01 NOTE — RESULT ENCOUNTER NOTE
"The following letter pertains to your most recent diagnostic tests:    -Your complete blood counts including your hemoglobin returned normal for you.       -Your prostate specific antigen (PSA) test result returned normal.     -Your total cholesterol is 191 which is at your goal of total cholesterol less than 200.    -Your triglycerides are 174 which are above your goal of triglycerides less than 150.    -Your HDL or \"good cholesterol\" is 31 which is below your goal of HDL cholesterol greater than 40.    -Your LDL cholesterol or \"bad cholesterol\" is 125 which is at your goal of LDL cholesterol less than <130, but increased since last check when it was 120.  Your LDL goal is based on your risk factors for artery disease.       -Liver and gallbladder tests are normal for you. (AST, Alk phos, bilirubin) with the exception of mild elevation in ALT, kidney function is normal for you (Creatinine, GFR), Sodium is normal, Potassium is normal for you, Calcium is normal for you, Glucose (blood sugar) is elevated, but not in the diabetic range.              Bottom line:  Your lab results look a little worse than last check.  I think weight loss could improve your lab results.  Loosing weight will decrease triglycerides, fasting blood sugar and the liver test (the liver test is most likely high due to fat accumulation in the liver).  Increasing physical activity will increase 'good cholesterol' (HDL).   Eating less meat and fried food and eating more fruits and vegetables and high fiber foods will decrease 'bad cholesterol' (LDL).       I recommend you work very hard at these things over the next 3 months and recheck your labs at that interval.   You can schedule a lab appointment for that purpose.          Follow up:  Lab appointment in 3 months to recheck labs.  Return sooner as needed.        Sincerely,    Dr. Fragoso"

## 2020-09-02 ENCOUNTER — HOSPITAL ENCOUNTER (OUTPATIENT)
Dept: CT IMAGING | Facility: CLINIC | Age: 60
Discharge: HOME | End: 2020-09-02
Attending: INTERNAL MEDICINE | Admitting: INTERNAL MEDICINE
Payer: COMMERCIAL

## 2020-09-02 DIAGNOSIS — Z87.891 PERSONAL HISTORY OF TOBACCO USE: ICD-10-CM

## 2020-09-02 PROCEDURE — G0297 LDCT FOR LUNG CA SCREEN: HCPCS

## 2020-09-02 NOTE — RESULT ENCOUNTER NOTE
The following letter pertains to your most recent diagnostic tests:    Good news! Your lung scan remains stable.  We should recheck in one year.        Sincerely,    Dr. Fragoso

## 2020-09-02 NOTE — LETTER
"September 2, 2020      Ricki Acuna  [address] [place] [address]        Dear ,    Good news! Your lung scan remains stable.  We should recheck in one year.         Sincerely,     Dr. Fragoso     Resulted Orders   CT Chest Lung Cancer Scrn Low Dose wo    Narrative    CT CHEST LOW DOSE WITHOUT CONTRAST 9/2/2020 7:09 AM     HISTORY: Personal history of tobacco use.    COMPARISON: September 16, 2019    TECHNIQUE: Scans obtained from the apices through the diaphragm  without IV contrast. Low dose CT chest technique was utilized.  Radiation dose for this scan was reduced using automated exposure  control, adjustment of the mA and/or kV according to patient size, or  iterative reconstruction technique.    FINDINGS:     Lungs:  Numerous scattered tiny nodules are stable.  No new nodules.    Additional findings: No pleural or pericardial effusion. Normal  caliber thoracic aorta.  No coronary calcifications demonstrated.   Normal heart size. Adrenal glands unremarkable. Remaining visualized  upper abdomen unremarkable. No frankly destructive bony lesions.      Impression    IMPRESSION:   1. ACR Assessment Category:  Lung-RADS Category 2. Benign appearance  or behavior. Recommendation: continue annual screening if clinically  relevant (please order exam code IMG 2290).    2. Significant Incidental Finding(s):  Category S: No.    Download the \"LungRADS Assessment Categories\" table at this site:   http://www.acr.org/Quality-Safety/Resources/LungRADS    NATASHA SANDHU MD                 "

## 2020-11-20 NOTE — TELEPHONE ENCOUNTER
lisinopril-hydrochlorothiazide (PRINZIDE/ZESTORETIC) 20-12.5 MG per tablet        Last Written Prescription Date: 3/30/2017  Last Fill Quantity: 90, # refills: 0  Last Office Visit with G, [facility] or [facility] prescribing provider: 12/7/2016       Potassium   Date Value Ref Range Status   06/06/2016 3.8 3.4 - 5.3 mmol/L Final     Creatinine   Date Value Ref Range Status   06/06/2016 0.80 0.66 - 1.25 mg/dL Final     BP Readings from Last 3 Encounters:   12/07/16 126/80   06/06/16 136/86   04/06/16 156/89        30.3

## 2020-12-14 ENCOUNTER — HEALTH MAINTENANCE LETTER (OUTPATIENT)
Age: 60
End: 2020-12-14

## 2021-02-08 ENCOUNTER — TELEPHONE (OUTPATIENT)
Dept: NURSING | Facility: CLINIC | Age: 61
End: 2021-02-08

## 2021-02-08 ENCOUNTER — OFFICE VISIT (OUTPATIENT)
Dept: URGENT CARE | Facility: URGENT CARE | Age: 61
End: 2021-02-08
Attending: FAMILY MEDICINE
Payer: COMMERCIAL

## 2021-02-08 ENCOUNTER — VIRTUAL VISIT (OUTPATIENT)
Dept: URGENT CARE | Facility: CLINIC | Age: 61
End: 2021-02-08
Payer: COMMERCIAL

## 2021-02-08 DIAGNOSIS — R05.9 COUGH: ICD-10-CM

## 2021-02-08 DIAGNOSIS — R05.9 COUGH: Primary | ICD-10-CM

## 2021-02-08 LAB
SARS-COV-2 RNA RESP QL NAA+PROBE: NORMAL
SPECIMEN SOURCE: NORMAL

## 2021-02-08 PROCEDURE — 99207 PR NO CHARGE LOS: CPT

## 2021-02-08 PROCEDURE — U0003 INFECTIOUS AGENT DETECTION BY NUCLEIC ACID (DNA OR RNA); SEVERE ACUTE RESPIRATORY SYNDROME CORONAVIRUS 2 (SARS-COV-2) (CORONAVIRUS DISEASE [COVID-19]), AMPLIFIED PROBE TECHNIQUE, MAKING USE OF HIGH THROUGHPUT TECHNOLOGIES AS DESCRIBED BY CMS-2020-01-R: HCPCS | Performed by: FAMILY MEDICINE

## 2021-02-08 PROCEDURE — U0005 INFEC AGEN DETEC AMPLI PROBE: HCPCS | Performed by: FAMILY MEDICINE

## 2021-02-08 PROCEDURE — 99213 OFFICE O/P EST LOW 20 MIN: CPT | Mod: TEL | Performed by: FAMILY MEDICINE

## 2021-02-08 NOTE — PATIENT INSTRUCTIONS
Call [phone]     It is recommended for fever and discomfort to treat with tylenol first 325-650mg (every 4-6 hours) -- this is preferred over ibuprofen (although evidence is lacking that NSAIDS can worsen illness with COVID-19)      Self-quarantine away from others for 10 days from onset of symptoms as a precaution   -- must be at least 24-48 hours without fever (without the use of ibuprofen/tylenol) before going back out into the public      Stay hydrated: drink  ounces of water a day      If you develop severe shortness of breath/difficulty breathing,  lightheadedness or dizziness-- please call the clinic immediately or go to ED to be evaluated if symptoms appear severe.           COVID test is usually completed by our central testing facility within 36-72 hours but could take longer based on current demand.   -**Please sign up for [facility] , this will be the fastest way to get results of your test**   -otherwise you may see delays as long as 24-48 hours to get notification by phone for a positive result; negative tests could see a delay as long as 7-10 days (via mail)  to reach you      It is important to know false negative rates for the COVID-19 PCR test can be as high as 30% , thus if your test is negative and you have ongoing or worsening symptoms please call your doctor's office for a recommendation on if you should be re-tested for COVID-19

## 2021-02-08 NOTE — TELEPHONE ENCOUNTER
Della calls in with fever of 102, cough, and sore throat.  He would like to be tested for covid. Told him that he could be scheduled for a virtual visit to get test ordered or my chart. Sent to .

## 2021-02-08 NOTE — PROGRESS NOTES
"The patient has been notified of following:     \"This telephone visit will be conducted via a call between you and your physician/provider. We have found that certain health care needs can be provided without the need for a physical exam.  This service lets us provide the care you need with a short phone conversation.  If a prescription is necessary we can send it directly to your pharmacy.  If lab work is needed we can place an order for that and you can then stop by our lab to have the test done at a later time.    Telephone visits are billed at different rates depending on your insurance coverage. During this emergency period, for some insurers they may be billed the same as an in-person visit.  Please reach out to your insurance provider with any questions.    If during the course of the call the physician/provider feels a telephone visit is not appropriate, you will not be charged for this service.\"    Patient has given verbal consent for Telephone visit?  Yes    What phone number would you like to be contacted at? mobile    How would you like to obtain your AVS? MyChart    Subjective:   Ricki Acuna is a 60 year old male who is contacted via telephone thru scheduled urgent care virtual visit to discuss:   3 days ago started with nasal drainage then a sore throat. He also has a light headache. Today  Sore throat has improved today but he now has a light cough.   Temps around 99-99.5  Temp 97 this morning but then a temp of 102 at this time -- now dropping to 100 with using mucinex cold meds    He feels good overall. Breathing has been not an issue for him    His wife is a nurse on the COVID unit.     He has not loss taste or smell. NO others at home who are sick.     Wife is vaccinated.     He is a previous smoker quit a year ago. He is adherent to his BP meds and has been in normotensive 120/70s    Patient Active Problem List    Diagnosis Date Noted     Morbid obesity (H) 08/31/2020     Priority: Medium "     Tobacco use disorder 12/07/2016     Priority: Medium     Hyperlipidemia LDL goal <100 07/21/2015     Priority: Medium     Benign essential hypertension      Priority: Medium     Adenomatous colon polyp      Priority: Medium       Current Outpatient Medications   Medication     aspirin 81 MG tablet     lisinopril-hydrochlorothiazide (ZESTORETIC) 20-12.5 MG tablet     No current facility-administered medications for this visit.        ROS:  As above per HPI    Objective:   Gen:  NAD  Pulm: non-labored work of breathing    No results found for any visits on 02/08/21.    Assessment & Plan:   Ricki Jose Acuna, 60 year old male who presents with:    Cough  Patient without respiratory difficulties.   COVID test ordered. F/u as needed - Get well loop made available. See AVS instructions for additional details.   - Symptomatic COVID-19 Virus (Coronavirus) by PCR  - COVID-19 GetWell Loop Referral      Phone Call Duration : 11  minutes    Winston Sunshine MD   [facility]    The use of Dragon/[employer] dictation services may have been used to construct the content in this note; any grammatical or spelling errors are non-intentional. Please contact the author of this note directly if you are in need of any clarification.

## 2021-02-09 LAB
LABORATORY COMMENT REPORT: NORMAL
SARS-COV-2 RNA RESP QL NAA+PROBE: NEGATIVE
SPECIMEN SOURCE: NORMAL

## 2021-03-30 ENCOUNTER — IMMUNIZATION (OUTPATIENT)
Dept: NURSING | Facility: CLINIC | Age: 61
End: 2021-03-30
Payer: COMMERCIAL

## 2021-03-30 PROCEDURE — 91300 PR COVID VAC PFIZER DIL RECON 30 MCG/0.3 ML IM: CPT

## 2021-03-30 PROCEDURE — 0001A PR COVID VAC PFIZER DIL RECON 30 MCG/0.3 ML IM: CPT

## 2021-04-20 ENCOUNTER — IMMUNIZATION (OUTPATIENT)
Dept: NURSING | Facility: CLINIC | Age: 61
End: 2021-04-20
Attending: INTERNAL MEDICINE
Payer: COMMERCIAL

## 2021-04-20 PROCEDURE — 91300 PR COVID VAC PFIZER DIL RECON 30 MCG/0.3 ML IM: CPT

## 2021-04-20 PROCEDURE — 0002A PR COVID VAC PFIZER DIL RECON 30 MCG/0.3 ML IM: CPT

## 2021-09-10 DIAGNOSIS — I10 BENIGN ESSENTIAL HYPERTENSION: ICD-10-CM

## 2021-09-10 ASSESSMENT — ENCOUNTER SYMPTOMS
HEADACHES: 0
COUGH: 0
HEARTBURN: 0
SORE THROAT: 0
ABDOMINAL PAIN: 0
SHORTNESS OF BREATH: 0
ARTHRALGIAS: 1
WEAKNESS: 0
PARESTHESIAS: 0
NERVOUS/ANXIOUS: 0
CHILLS: 0
EYE PAIN: 0
NAUSEA: 0
FEVER: 0
FREQUENCY: 0
CONSTIPATION: 0
MYALGIAS: 0
DYSURIA: 0
DIARRHEA: 1
JOINT SWELLING: 0
HEMATOCHEZIA: 1
PALPITATIONS: 0
HEMATURIA: 0
DIZZINESS: 0

## 2021-09-13 RX ORDER — LISINOPRIL AND HYDROCHLOROTHIAZIDE 12.5; 2 MG/1; MG/1
1 TABLET ORAL DAILY
Qty: 90 TABLET | Refills: 3 | Status: SHIPPED | OUTPATIENT
Start: 2021-09-13 | End: 2022-09-09

## 2021-09-13 NOTE — TELEPHONE ENCOUNTER
Routing refill request to provider for review/approval because:  Overdue for labs so fails protocol, so must route to pcp.  Pt has visit scheduled in 2 days. This is pended for 90 with 3 refills?  Maritza Toro RN

## 2021-09-13 NOTE — TELEPHONE ENCOUNTER
Next 5 appointments (look out 90 days)    Sep 15, 2021  7:30 AM  PHYSICAL with Bryan Fragoso MD  [facility] ([facility] ) [address] [facility], [address]  [facility] [address]  [phone]          RT Boris (R)

## 2021-09-15 ENCOUNTER — OFFICE VISIT (OUTPATIENT)
Dept: FAMILY MEDICINE | Facility: CLINIC | Age: 61
End: 2021-09-15
Payer: COMMERCIAL

## 2021-09-15 VITALS
BODY MASS INDEX: 34.59 KG/M2 | OXYGEN SATURATION: 98 % | HEIGHT: 73 IN | SYSTOLIC BLOOD PRESSURE: 128 MMHG | TEMPERATURE: 98 F | WEIGHT: 261 LBS | RESPIRATION RATE: 16 BRPM | HEART RATE: 75 BPM | DIASTOLIC BLOOD PRESSURE: 88 MMHG

## 2021-09-15 DIAGNOSIS — Z23 NEED FOR PROPHYLACTIC VACCINATION AND INOCULATION AGAINST INFLUENZA: ICD-10-CM

## 2021-09-15 DIAGNOSIS — D12.6 ADENOMATOUS POLYP OF COLON, UNSPECIFIED PART OF COLON: ICD-10-CM

## 2021-09-15 DIAGNOSIS — K92.1 HEMATOCHEZIA: ICD-10-CM

## 2021-09-15 DIAGNOSIS — K60.2 ANAL FISSURE: ICD-10-CM

## 2021-09-15 DIAGNOSIS — Z00.00 ROUTINE GENERAL MEDICAL EXAMINATION AT A HEALTH CARE FACILITY: Primary | ICD-10-CM

## 2021-09-15 DIAGNOSIS — M17.11 PRIMARY OSTEOARTHRITIS OF RIGHT KNEE: ICD-10-CM

## 2021-09-15 DIAGNOSIS — D50.0 IRON DEFICIENCY ANEMIA DUE TO CHRONIC BLOOD LOSS: ICD-10-CM

## 2021-09-15 DIAGNOSIS — I10 BENIGN ESSENTIAL HYPERTENSION: ICD-10-CM

## 2021-09-15 DIAGNOSIS — Z87.891 PERSONAL HISTORY OF TOBACCO USE: ICD-10-CM

## 2021-09-15 DIAGNOSIS — D64.9 ANEMIA, UNSPECIFIED TYPE: ICD-10-CM

## 2021-09-15 DIAGNOSIS — E78.5 HYPERLIPIDEMIA LDL GOAL <100: ICD-10-CM

## 2021-09-15 DIAGNOSIS — E66.01 MORBID OBESITY (H): ICD-10-CM

## 2021-09-15 DIAGNOSIS — Z12.5 PROSTATE CANCER SCREENING: ICD-10-CM

## 2021-09-15 LAB
ALBUMIN SERPL-MCNC: 3.2 G/DL (ref 3.4–5)
ALP SERPL-CCNC: 97 U/L (ref 40–150)
ALT SERPL W P-5'-P-CCNC: 69 U/L (ref 0–70)
ANION GAP SERPL CALCULATED.3IONS-SCNC: 5 MMOL/L (ref 3–14)
AST SERPL W P-5'-P-CCNC: 23 U/L (ref 0–45)
BILIRUB SERPL-MCNC: 0.5 MG/DL (ref 0.2–1.3)
BUN SERPL-MCNC: 8 MG/DL (ref 7–30)
CALCIUM SERPL-MCNC: 8.3 MG/DL (ref 8.5–10.1)
CHLORIDE BLD-SCNC: 106 MMOL/L (ref 94–109)
CHOLEST SERPL-MCNC: 162 MG/DL
CO2 SERPL-SCNC: 29 MMOL/L (ref 20–32)
CREAT SERPL-MCNC: 0.81 MG/DL (ref 0.66–1.25)
ERYTHROCYTE [DISTWIDTH] IN BLOOD BY AUTOMATED COUNT: 12.8 % (ref 10–15)
FASTING STATUS PATIENT QL REPORTED: YES
GFR SERPL CREATININE-BSD FRML MDRD: >90 ML/MIN/1.73M2
GLUCOSE BLD-MCNC: 129 MG/DL (ref 70–99)
HCT VFR BLD AUTO: 38 % (ref 40–53)
HDLC SERPL-MCNC: 30 MG/DL
HGB BLD-MCNC: 13 G/DL (ref 13.3–17.7)
LDLC SERPL CALC-MCNC: 103 MG/DL
MCH RBC QN AUTO: 30.1 PG (ref 26.5–33)
MCHC RBC AUTO-ENTMCNC: 34.2 G/DL (ref 31.5–36.5)
MCV RBC AUTO: 88 FL (ref 78–100)
NONHDLC SERPL-MCNC: 132 MG/DL
PLATELET # BLD AUTO: 326 10E3/UL (ref 150–450)
POTASSIUM BLD-SCNC: 3.1 MMOL/L (ref 3.4–5.3)
PROT SERPL-MCNC: 6.8 G/DL (ref 6.8–8.8)
PSA SERPL-MCNC: 2.14 UG/L (ref 0–4)
RBC # BLD AUTO: 4.32 10E6/UL (ref 4.4–5.9)
SODIUM SERPL-SCNC: 140 MMOL/L (ref 133–144)
TRIGL SERPL-MCNC: 147 MG/DL
WBC # BLD AUTO: 5.8 10E3/UL (ref 4–11)

## 2021-09-15 PROCEDURE — 99214 OFFICE O/P EST MOD 30 MIN: CPT | Mod: 25 | Performed by: INTERNAL MEDICINE

## 2021-09-15 PROCEDURE — 80061 LIPID PANEL: CPT | Performed by: INTERNAL MEDICINE

## 2021-09-15 PROCEDURE — 36415 COLL VENOUS BLD VENIPUNCTURE: CPT | Performed by: INTERNAL MEDICINE

## 2021-09-15 PROCEDURE — 85027 COMPLETE CBC AUTOMATED: CPT | Performed by: INTERNAL MEDICINE

## 2021-09-15 PROCEDURE — 83550 IRON BINDING TEST: CPT | Performed by: INTERNAL MEDICINE

## 2021-09-15 PROCEDURE — 82728 ASSAY OF FERRITIN: CPT | Performed by: INTERNAL MEDICINE

## 2021-09-15 PROCEDURE — G0103 PSA SCREENING: HCPCS | Performed by: INTERNAL MEDICINE

## 2021-09-15 PROCEDURE — 80053 COMPREHEN METABOLIC PANEL: CPT | Performed by: INTERNAL MEDICINE

## 2021-09-15 PROCEDURE — 99396 PREV VISIT EST AGE 40-64: CPT | Performed by: INTERNAL MEDICINE

## 2021-09-15 PROCEDURE — G0296 VISIT TO DETERM LDCT ELIG: HCPCS | Performed by: INTERNAL MEDICINE

## 2021-09-15 RX ORDER — POTASSIUM CHLORIDE 750 MG/1
10 TABLET, EXTENDED RELEASE ORAL DAILY
Qty: 90 TABLET | Refills: 3 | Status: SHIPPED | OUTPATIENT
Start: 2021-09-15 | End: 2022-09-09

## 2021-09-15 ASSESSMENT — ENCOUNTER SYMPTOMS
DYSURIA: 0
SHORTNESS OF BREATH: 0
PARESTHESIAS: 0
NAUSEA: 0
NERVOUS/ANXIOUS: 0
DIZZINESS: 0
FEVER: 0
EYE PAIN: 0
WEAKNESS: 0
HEARTBURN: 0
HEMATURIA: 0
ARTHRALGIAS: 1
PALPITATIONS: 0
DIARRHEA: 1
JOINT SWELLING: 0
CHILLS: 0
MYALGIAS: 0
CONSTIPATION: 0
ABDOMINAL PAIN: 0
SORE THROAT: 0
FREQUENCY: 0
HEADACHES: 0
HEMATOCHEZIA: 1
COUGH: 0

## 2021-09-15 ASSESSMENT — MIFFLIN-ST. JEOR: SCORE: 2059.35

## 2021-09-15 NOTE — LETTER
September 16, 2021      iRcki Acuna  [address] [place] [address]        Dear ,    The following letter pertains to your most recent diagnostic tests:     -Your prostate specific antigen (PSA) test result returned normal.     -The metabolic panel shows a mildly low potassium level.  The remainder of the metabolic panel is okay for you.     -The cholesterol is improved from last check!  Nice work with this.     -The complete blood count is normal with the exception of a mild decrease in your hemoglobin from last year.  The hemoglobin is a few decimal points below the lower limit of normal.  I have added some additional blood tests onto these samples to see if this might relate from lack of iron.  Those results are pending.       Bottom line: The potassium is low from the hydrochlorothiazide medication that you take.  To address this, I recommend that you start a small dose of potassium supplement to be taken on a daily basis to make sure that the potassium does not drop too low.  I sent a prescription for the potassium supplement to your pharmacy.     The low hemoglobin may require additional evaluation depending on the results of the iron tests that were added to your blood samples from today.  I will communicate the results of those tests to you when they are available.           Sincerely,     Dr. Fragoso      Resulted Orders   CBC with platelets   Result Value Ref Range    WBC Count 5.8 4.0 - 11.0 10e3/uL    RBC Count 4.32 (L) 4.40 - 5.90 10e6/uL    Hemoglobin 13.0 (L) 13.3 - 17.7 g/dL    Hematocrit 38.0 (L) 40.0 - 53.0 %    MCV 88 78 - 100 fL    MCH 30.1 26.5 - 33.0 pg    MCHC 34.2 31.5 - 36.5 g/dL    RDW 12.8 10.0 - 15.0 %    Platelet Count 326 150 - 450 10e3/uL   Comprehensive metabolic panel   Result Value Ref Range    Sodium 140 133 - 144 mmol/L    Potassium 3.1 (L) 3.4 - 5.3 mmol/L    Chloride 106 94 - 109 mmol/L    Carbon Dioxide (CO2) 29 20 - 32 mmol/L    Anion Gap 5 3 - 14 mmol/L     Urea Nitrogen 8 7 - 30 mg/dL    Creatinine 0.81 0.66 - 1.25 mg/dL    Calcium 8.3 (L) 8.5 - 10.1 mg/dL    Glucose 129 (H) 70 - 99 mg/dL    Alkaline Phosphatase 97 40 - 150 U/L    AST 23 0 - 45 U/L    ALT 69 0 - 70 U/L    Protein Total 6.8 6.8 - 8.8 g/dL    Albumin 3.2 (L) 3.4 - 5.0 g/dL    Bilirubin Total 0.5 0.2 - 1.3 mg/dL    GFR Estimate >90 >60 mL/min/1.73m2      Comment:      As of July 11, 2021, eGFR is calculated by the CKD-EPI creatinine equation, without race adjustment. eGFR can be influenced by muscle mass, exercise, and diet. The reported eGFR is an estimation only and is only applicable if the renal function is stable.   Lipid panel reflex to direct LDL Fasting   Result Value Ref Range    Cholesterol 162 <200 mg/dL    Triglycerides 147 <150 mg/dL    Direct Measure HDL 30 (L) >=40 mg/dL    LDL Cholesterol Calculated 103 (H) <=100 mg/dL    Non HDL Cholesterol 132 (H) <130 mg/dL    Patient Fasting > 8hrs? Yes     Narrative    Cholesterol  Desirable:  <200 mg/dL    Triglycerides  Normal:  Less than 150 mg/dL  Borderline High:  150-199 mg/dL  High:  200-499 mg/dL  Very High:  Greater than or equal to 500 mg/dL    Direct Measure HDL  Female:  Greater than or equal to 50 mg/dL   Male:  Greater than or equal to 40 mg/dL    LDL Cholesterol  Desirable:  <100mg/dL  Above Desirable:  100-129 mg/dL   Borderline High:  130-159 mg/dL   High:  160-189 mg/dL   Very High:  >= 190 mg/dL    Non HDL Cholesterol  Desirable:  130 mg/dL  Above Desirable:  130-159 mg/dL  Borderline High:  160-189 mg/dL  High:  190-219 mg/dL  Very High:  Greater than or equal to 220 mg/dL   PROSTATE SPEC ANTIGEN SCREEN   Result Value Ref Range    Prostate Specific Antigen Screen 2.14 0.00 - 4.00 ug/L

## 2021-09-15 NOTE — PATIENT INSTRUCTIONS
"Use over the counter Desitin cream or A+D ointment applied to your bottom after bathing or after cleaning after a bowel movement to heal the small anal fissure (crack in skin) in your bottom that caused the bleeding.     You should get the new shingles vaccine series \"SHINGRIX\" (not Zostavax) at a pharmacy.      Please call [facility] at [phone] to schedule your Lung Cancer Screening CT.       Lung Cancer Screening   Frequently Asked Questions  If you are at high-risk for lung cancer, getting screened with low-dose computed tomography (LDCT) every year can help save your life. This handout offers answers to some of the most common questions about lung cancer screening. If you have other questions, please call [phone] ([phone]).     What is it?  Lung cancer screening uses special X-ray technology to create an image of your lung tissue. The exam is quick and easy and takes less than 10 seconds. We don t give you any medicine or use any needles. You can eat before and after the exam. You don t need to change your clothes as long as the clothing on your chest doesn t contain metal. But, you do need to be able to hold your breath for at least 6 seconds during the exam.    What is the goal of lung cancer screening?  The goal of lung cancer screening is to save lives. Many times, lung cancer is not found until a person starts having physical symptoms. Lung cancer screening can help detect lung cancer in the earliest stages when it may be easier to treat.    Who should be screened for lung cancer?  We suggest lung cancer screening for anyone who is at high-risk for lung cancer. You are in the high-risk group if you:      are between the ages of 55 and 79, and    have smoked at least 1 pack of cigarettes a day for 30 or more years, and    still smoke or have quit within the past 15 years.    However, if you have a new cough or shortness of breath, you should talk to your doctor before being " screened.    Some national lung health advocacy groups also recommend screening for people ages 50 to 79 who have smoked an average of 1 pack of cigarettes a day for 20 years. They must also have at least 1 other risk factor for lung cancer, not including exposure to secondhand smoke. Other risk factors are having had cancer in the past, emphysema, pulmonary fibrosis, COPD, a family history of lung cancer, or exposure to certain materials such as arsenic, asbestos, beryllium, cadmium, chromium, diesel fumes, nickel, radon or silica. Your care team can help you know if you have one of these risk factors.     Why does it matter if I have symptoms?  Certain symptoms can be a sign that you have a condition in your lungs that should be checked and treated by your doctor. These symptoms include fever, chest pain, a new or changing cough, shortness of breath that you have never felt before, coughing up blood or unexplained weight loss. Having any of these symptoms can greatly affect the results of lung cancer screening.       Should all smokers get an LDCT lung cancer screening exam?  It depends. Lung cancer screening is for a very specific group of men and women who have a history of heavy smoking over a long period of time (see  Who should be screened for lung cancer  above).  I am in the high-risk group, but have been diagnosed with cancer in the past. Is LDCT lung cancer screening right for me?  In some cases, you should not have LDCT lung screening, such as when your doctor is already following your cancer with CT scan studies. Your doctor will help you decide if LDCT lung screening is right for you.  Do I need to have a screening exam every year?  Yes. If you are in the high-risk group described earlier, you should get an LDCT lung cancer screening exam every year until you are 79, or are no longer willing or able to undergo screening and possible procedures to diagnose and treat lung cancer.  How effective is LDCT  at preventing death from lung cancer?  Studies have shown that LDCT lung cancer screening can lower the risk of death from lung cancer by 20 percent in people who are at high-risk.  What are the risks?  There are some risks and limitations of LDCT lung cancer screening. We want to make sure you understand the risks and benefits, so please let us know if you have any questions. Your doctor may want to talk with you more about these risks.    Radiation exposure: As with any exam that uses radiation, there is a very small increased risk of cancer. The amount of radiation in LDCT is small--about the same amount a person would get from a mammogram. Your doctor orders the exam when he or she feels the potential benefits outweigh the risks.    False negatives: No test is perfect, including LDCT. It is possible that you may have a medical condition, including lung cancer, that is not found during your exam. This is called a false negative result.    False positives and more testing: LDCT very often finds something in the lung that could be cancer, but in fact is not. This is called a false positive result. False positive tests often cause anxiety. To make sure these findings are not cancer, you may need to have more tests. These tests will be done only if you give us permission. Sometimes patients need a treatment that can have side effects, such as a biopsy. For more information on false positives, see  What can I expect from the results?     Findings not related to lung cancer: Your LDCT exam also takes pictures of areas of your body next to your lungs. In a very small number of cases, the CT scan will show an abnormal finding in one of these areas, such as your kidneys, adrenal glands, liver or thyroid. This finding may not be serious, but you may need more tests. Your doctor can help you decide what other tests you may need, if any.  What can I expect from the results?  About 1 out of 4 LDCT exams will find something  that may need more tests. Most of the time, these findings are lung nodules. Lung nodules are very small collections of tissue in the lung. These nodules are very common, and the vast majority--more than 97 percent--are not cancer (benign). Most are normal lymph nodes or small areas of scarring from past infections.  But, if a small lung nodule is found to be cancer, the cancer can be cured more than 90 percent of the time. To know if the nodule is cancer, we may need to get more images before your next yearly screening exam. If the nodule has suspicious features (for example, it is large, has an odd shape or grows over time), we will refer you to a specialist for further testing.  Will my doctor also get the results?  Yes. Your doctor will get a copy of your results.  Is it okay to keep smoking now that there s a cancer screening exam?  No. Tobacco is one of the strongest cancer-causing agents. It causes not only lung cancer, but other cancers and cardiovascular (heart) diseases as well. The damage caused by smoking builds over time. This means that the longer you smoke, the higher your risk of disease. While it is never too late to quit, the sooner you quit, the better.  Where can I find help to quit smoking?  The best way to prevent lung cancer is to stop smoking. If you have already quit smoking, congratulations and keep it up! For help on quitting smoking, please call [employer] at 8-639-375-LMCF ([phone]) or the American [employer] Society at 1-671.828.8032 to find local resources near you.  One-on-one health coaching:  If you d prefer to work individually with a health care provider on tobacco cessation, we offer:      Medication Therapy Management:  Our specially trained pharmacists work closely with you and your doctor to help you quit smoking.  Call [phone] or [phone] (toll free).     Can Do: Health coaching offered by [facility].  [email or web address]

## 2021-09-15 NOTE — PROGRESS NOTES
SUBJECTIVE:   CC: Ricki Acuna is an 60 year old male who presents for preventative health visit.       Patient has been advised of split billing requirements and indicates understanding: Yes  Healthy Habits:     Getting at least 3 servings of Calcium per day:  NO    Bi-annual eye exam:  NO    Dental care twice a year:  NO    Sleep apnea or symptoms of sleep apnea:  None    Diet:  Regular (no restrictions)    Frequency of exercise:  None    Taking medications regularly:  Yes    Medication side effects:  None    PHQ-2 Total Score: 0    Additional concerns today:  No          See ROS    Today's PHQ-2 Score:   PHQ-2 (  [employer]) 9/10/2021   Q1: Little interest or pleasure in doing things 0   Q2: Feeling down, depressed or hopeless 0   PHQ-2 Score 0   Q1: Little interest or pleasure in doing things Not at all   Q2: Feeling down, depressed or hopeless Not at all   PHQ-2 Score 0       Abuse: Current or Past(Physical, Sexual or Emotional)- No  Do you feel safe in your environment? Yes        Social History     Tobacco Use     Smoking status: Former Smoker     Packs/day: 0.50     Years: 0.00     Pack years: 0.00     Types: Cigarettes     Quit date: 2020     Years since quittin.7     Smokeless tobacco: Never Used   Substance Use Topics     Alcohol use: Yes     Alcohol/week: 7.0 standard drinks     Comment: 2 drinks a week     If you drink alcohol do you typically have >3 drinks per day or >7 drinks per week? No    Alcohol Use 9/10/2021   Prescreen: >3 drinks/day or >7 drinks/week? No   Prescreen: >3 drinks/day or >7 drinks/week? -       Last PSA:   PSA   Date Value Ref Range Status   2020 1.51 0 - 4 ug/L Final     Comment:     Assay Method:  Chemiluminescence using Siemens Vista analyzer       Reviewed orders with patient. Reviewed health maintenance and updated orders accordingly - Yes  Patient Active Problem List   Diagnosis     Benign essential hypertension     Adenomatous colon polyp      Hyperlipidemia LDL goal <100     Morbid obesity (H)     Past Surgical History:   Procedure Laterality Date     ABDOMEN SURGERY       CHOLECYSTECTOMY      open     COLONOSCOPY  3/20/15    hx polyps     COLONOSCOPY N/A 3/20/2015    Procedure: COMBINED COLONOSCOPY, SINGLE OR MULTIPLE BIOPSY/POLYPECTOMY BY BIOPSY;  Surgeon: Chun Joe MD;  Location:  GI     HERNIA REPAIR      inguinal hernia repair       Social History     Tobacco Use     Smoking status: Former Smoker     Packs/day: 0.50     Years: 0.00     Pack years: 0.00     Types: Cigarettes     Quit date: 2020     Years since quittin.7     Smokeless tobacco: Never Used   Substance Use Topics     Alcohol use: Yes     Alcohol/week: 7.0 standard drinks     Comment: 2 drinks a week     Family History   Problem Relation Age of Onset     Prostate Cancer Father      Cerebrovascular Disease Mother      Diabetes Mother          Current Outpatient Medications   Medication Sig Dispense Refill     aspirin 81 MG tablet Take by mouth daily 30 tablet      lisinopril-hydrochlorothiazide (ZESTORETIC) 20-12.5 MG tablet TAKE 1 TABLET BY MOUTH DAILY 90 tablet 3     No Known Allergies    Reviewed and updated as needed this visit by clinical staff  Tobacco  Allergies  Meds              Reviewed and updated as needed this visit by Provider                Past Medical History:   Diagnosis Date     Adenomatous colon polyp 3-20-15    repeat in 3 years      Hypertension      Skin cancer     Dr. Antoine Aguirre MD DERMATOLOGY SPECIALIST       Past Surgical History:   Procedure Laterality Date     ABDOMEN SURGERY       CHOLECYSTECTOMY      open     COLONOSCOPY  3/20/15    hx polyps     COLONOSCOPY N/A 3/20/2015    Procedure: COMBINED COLONOSCOPY, SINGLE OR MULTIPLE BIOPSY/POLYPECTOMY BY BIOPSY;  Surgeon: Chun Joe MD;  Location:  GI     HERNIA REPAIR      inguinal hernia repair       Review of Systems   Constitutional: Negative for chills and fever.   HENT:  "Negative for congestion, ear pain, hearing loss and sore throat.    Eyes: Negative for pain and visual disturbance.   Respiratory: Negative for cough and shortness of breath.    Cardiovascular: Negative for chest pain, palpitations and peripheral edema.   Gastrointestinal: Positive for diarrhea and hematochezia. Negative for abdominal pain, constipation, heartburn and nausea.   Genitourinary: Negative for discharge, dysuria, frequency, genital sores, hematuria, impotence and urgency.   Musculoskeletal: Positive for arthralgias. Negative for joint swelling and myalgias.   Skin: Negative for rash.   Neurological: Negative for dizziness, weakness, headaches and paresthesias.   Psychiatric/Behavioral: Negative for mood changes. The patient is not nervous/anxious.      Noted BRBPR 2 weeks ago, had diarrhea , but now stools are more like mashed potatoes   Known history of right knee OA; had steroid injection at ortho and it helped, pain prevents exercising     OBJECTIVE:   /88 (BP Location: Right arm, Cuff Size: Adult Large)   Pulse 75   Temp 98  F (36.7  C) (Temporal)   Resp 16   Ht 1.857 m (6' 1.1\")   Wt 118.4 kg (261 lb)   SpO2 98%   BMI 34.34 kg/m      Physical Exam  GENERAL: healthy, alert and no distress  EYES: Eyes grossly normal to inspection, PERRL and conjunctivae and sclerae normal  HENT: ear canals and TM's normal  NECK: no adenopathy, no asymmetry, masses, or scars and thyroid normal to palpation  RESP: lungs clear to auscultation - no rales, rhonchi or wheezes  CV: regular rate and rhythm, normal S1 S2, no S3 or S4, no murmur, click or rub, no peripheral edema and peripheral pulses strong  ABDOMEN: soft, nontender, no hepatosplenomegaly, no masses and bowel sounds normal  RECTAL: normal sphincter tone, no rectal masses, prostate normal size, smooth, nontender without nodules or masses; small anal fissure noted at about 5 o'clock on rectal exam (probable source of reported BRPR)  MS: no gross " musculoskeletal defects noted, no edema  SKIN: no suspicious lesions or rashes  NEURO: Normal strength and tone, mentation intact and speech normal  PSYCH: mentation appears normal, affect normal/bright    Labs pending     ASSESSMENT/PLAN:   (Z00.00) Routine general medical examination at a health care facility  (primary encounter diagnosis)  Comment:   Plan:     (E66.01) Morbid obesity (H)  Comment: Counseled on diet and exercise interventions to promote weight loss  Plan:     (I10) Benign essential hypertension  Comment: OK  Plan: Continue current medications    (E78.5) Hyperlipidemia LDL goal <100  Comment: not on statin therapy ; recheck  Plan: CBC with platelets, Comprehensive metabolic         panel, Lipid panel reflex to direct LDL Fasting            (Z23) Need for prophylactic vaccination and inoculation against influenza  Comment: he declined flu shot recommendation      (Z87.891) Personal history of tobacco use  Comment:   Plan: Prof fee: Shared Decisionmaking for Lung Cancer        Screening, Prof Fee: Shared Decision Making         Visit for Lung Cancer Screening, CT Chest Lung         Cancer Scrn Low Dose wo            (D12.6) Adenomatous polyp of colon, unspecified part of colon  Comment: due for colonoscopy in   Plan:     (Z12.5) Prostate cancer screening  Comment:   Plan: PROSTATE SPEC ANTIGEN SCREEN          Anal fissure  See patient instructions    Right knee primary osteoarthritis  Trial of PT     Patient has been advised of split billing requirements and indicates understanding: Yes  COUNSELING:   Reviewed preventive health counseling, as reflected in patient instructions  Special attention given to:        Regular exercise       Healthy diet/nutrition       Immunizations    Recommended flu shot and shingrix              Consider Hep C screening for all patients one time for ages 18-79 years       HIV screeninx in teen years, 1x in adult years, and at intervals if high risk       Colon  "cancer screening; due for colonoscopy in 2024       Prostate cancer screening       Consider lung cancer screening for ages 55-80 years and 30 pack-year smoking history  ; referred for exam     Estimated body mass index is 34.34 kg/m  as calculated from the following:    Height as of this encounter: 1.857 m (6' 1.1\").    Weight as of this encounter: 118.4 kg (261 lb).     Weight management plan: Discussed healthy diet and exercise guidelines    He reports that he quit smoking about 20 months ago. His smoking use included cigarettes. He smoked 0.50 packs per day for 0.00 years. He has never used smokeless tobacco.      Counseling Resources:  ATP IV Guidelines  Pooled Cohorts Equation Calculator  FRAX Risk Assessment  ICSI Preventive Guidelines  Dietary Guidelines for Americans, 2010  [employer] MyPlate  ASA Prophylaxis  Lung CA Screening    Bryan Fragoso MD  [facility]  Lung Cancer Screening Shared Decision Making Visit     Ricki Acuna is eligible for lung cancer screening on the basis of the information provided in my signed lung cancer screening order.     I have discussed with patient the risks and benefits of screening for lung cancer with low-dose CT.     The risks include:  radiation exposure: one low dose chest CT has as much ionizing radiation as about 15 chest x-rays or 6 months of background radiation living in [place]    false positives: 96% of positive findings/nodules are NOT cancer, but some might still require additional diagnostic evaluation, including biopsy  over-diagnosis: some slow growing cancers that might never have been clinically significant will be detected and treated unnecessarily     The benefit of early detection of lung cancer is contingent upon adherence to annual screening or more frequent follow up if indicated.     Furthermore, reaping the benefits of screening requires Ricki Acuna to be willing and physically able to undergo diagnostic procedures, " if indicated. Although no specific guide is available for determining severity of comorbidities, it is reasonable to withhold screening in patients who have greater mortality risk from other diseases.     We did discuss that the only way to prevent lung cancer is to not smoke. Smoking cessation counseling was not given.      I did not offer risk estimation using a calculator such as this one:    ShouldIScreen

## 2021-09-15 NOTE — RESULT ENCOUNTER NOTE
The following letter pertains to your most recent diagnostic tests:    -Your prostate specific antigen (PSA) test result returned normal.     -The metabolic panel shows a mildly low potassium level.  The remainder of the metabolic panel is okay for you.    -The cholesterol is improved from last check!  Nice work with this.    -The complete blood count is normal with the exception of a mild decrease in your hemoglobin from last year.  The hemoglobin is a few decimal points below the lower limit of normal.  I have added some additional blood tests onto these samples to see if this might relate from lack of iron.  Those results are pending.      Bottom line: The potassium is low from the hydrochlorothiazide medication that you take.  To address this, I recommend that you start a small dose of potassium supplement to be taken on a daily basis to make sure that the potassium does not drop too low.  I sent a prescription for the potassium supplement to your pharmacy.    The low hemoglobin may require additional evaluation depending on the results of the iron tests that were added to your blood samples from today.  I will communicate the results of those tests to you when they are available.          Sincerely,    Dr. Fragoso

## 2021-09-16 ENCOUNTER — HOSPITAL ENCOUNTER (OUTPATIENT)
Dept: CT IMAGING | Facility: CLINIC | Age: 61
Discharge: HOME | End: 2021-09-16
Attending: INTERNAL MEDICINE | Admitting: INTERNAL MEDICINE
Payer: COMMERCIAL

## 2021-09-16 DIAGNOSIS — Z87.891 PERSONAL HISTORY OF TOBACCO USE: ICD-10-CM

## 2021-09-16 LAB
FERRITIN SERPL-MCNC: 74 NG/ML (ref 26–388)
IRON SATN MFR SERPL: 18 % (ref 15–46)
IRON SERPL-MCNC: 53 UG/DL (ref 35–180)
TIBC SERPL-MCNC: 298 UG/DL (ref 240–430)

## 2021-09-16 PROCEDURE — 71271 CT THORAX LUNG CANCER SCR C-: CPT

## 2021-09-20 ENCOUNTER — TELEPHONE (OUTPATIENT)
Dept: FAMILY MEDICINE | Facility: CLINIC | Age: 61
End: 2021-09-20

## 2021-09-20 NOTE — TELEPHONE ENCOUNTER
Call from Choua with [facility].    Calling to relay incidental finding from Chest CT 9/16. States that PCP reviewed results but did not relay to patient via mychart.     Routing to provider  - Please review Impression #2 from CT Chest results 9/16. Radiologist noted hepatic steatosis.

## 2021-09-28 ENCOUNTER — THERAPY VISIT (OUTPATIENT)
Dept: PHYSICAL THERAPY | Facility: CLINIC | Age: 61
End: 2021-09-28
Attending: INTERNAL MEDICINE
Payer: COMMERCIAL

## 2021-09-28 DIAGNOSIS — G89.29 CHRONIC PAIN OF RIGHT KNEE: Primary | ICD-10-CM

## 2021-09-28 DIAGNOSIS — M17.11 PRIMARY OSTEOARTHRITIS OF RIGHT KNEE: ICD-10-CM

## 2021-09-28 DIAGNOSIS — M25.561 CHRONIC PAIN OF RIGHT KNEE: Primary | ICD-10-CM

## 2021-09-28 PROCEDURE — 97110 THERAPEUTIC EXERCISES: CPT | Mod: GP | Performed by: PHYSICAL THERAPIST

## 2021-09-28 PROCEDURE — 97161 PT EVAL LOW COMPLEX 20 MIN: CPT | Mod: GP | Performed by: PHYSICAL THERAPIST

## 2021-09-28 ASSESSMENT — ACTIVITIES OF DAILY LIVING (ADL)
HOW_WOULD_YOU_RATE_THE_CURRENT_FUNCTION_OF_YOUR_KNEE_DURING_YOUR_USUAL_DAILY_ACTIVITIES_ON_A_SCALE_FROM_0_TO_100_WITH_100_BEING_YOUR_LEVEL_OF_KNEE_FUNCTION_PRIOR_TO_YOUR_INJURY_AND_0_BEING_THE_INABILITY_TO_PERFORM_ANY_OF_YOUR_USUAL_DAILY_ACTIVITIES?: 80
WEAKNESS: I DO NOT HAVE THE SYMPTOM
KNEE_ACTIVITY_OF_DAILY_LIVING_SCORE: 91.43
STIFFNESS: I DO NOT HAVE THE SYMPTOM
RISE FROM A CHAIR: ACTIVITY IS NOT DIFFICULT
STAND: ACTIVITY IS MINIMALLY DIFFICULT
GO UP STAIRS: ACTIVITY IS NOT DIFFICULT
GO DOWN STAIRS: ACTIVITY IS NOT DIFFICULT
LIMPING: I DO NOT HAVE THE SYMPTOM
GIVING WAY, BUCKLING OR SHIFTING OF KNEE: I DO NOT HAVE THE SYMPTOM
RAW_SCORE: 64
SQUAT: ACTIVITY IS NOT DIFFICULT
SIT WITH YOUR KNEE BENT: ACTIVITY IS NOT DIFFICULT
KNEE_ACTIVITY_OF_DAILY_LIVING_SUM: 64
HOW_WOULD_YOU_RATE_THE_OVERALL_FUNCTION_OF_YOUR_KNEE_DURING_YOUR_USUAL_DAILY_ACTIVITIES?: NEARLY NORMAL
WALK: ACTIVITY IS NOT DIFFICULT
SWELLING: I DO NOT HAVE THE SYMPTOM
PAIN: THE SYMPTOM AFFECTS MY ACTIVITY SLIGHTLY
AS_A_RESULT_OF_YOUR_KNEE_INJURY,_HOW_WOULD_YOU_RATE_YOUR_CURRENT_LEVEL_OF_DAILY_ACTIVITY?: NEARLY NORMAL
KNEEL ON THE FRONT OF YOUR KNEE: ACTIVITY IS FAIRLY DIFFICULT

## 2021-09-28 NOTE — PROGRESS NOTES
Physical Therapy Initial Evaluation  Subjective:  The history is provided by the patient.   Patient Health History  Ricki Acuna being seen for R knee.     Problem began: 9/15/2021.   Problem occurred: 4-5 years ago had onset of knee pain, saw ortho and had an injection. Now when has to be on his knees or work out has inc knee pain. Pain subsides by next day, would like to be more active to lose weight.     General health as reported by patient is good.  Pertinent medical history includes: high blood pressure, overweight and smoking.     Medical allergies: none.       Current medications:  High blood pressure medication.    Current occupation is Owner/Worker (works on airplanes).   Primary job tasks include:  Operating a machine/assembly and lifting/carrying.                  Therapist Generated HPI Evaluation         Type of problem:  Right knee.    This is a chronic condition.  Condition occurred with:  Degenerative joint disease.  Where condition occurred: other.  Patient reports pain:  Anterior.  Pain is described as aching and is intermittent.  Pain radiates to:  Knee. Pain is worse in the P.M..  Since onset symptoms are gradually worsening.  Associated symptoms:  Loss of motion/stiffness. Symptoms are exacerbated by kneeling, bending/squatting, ascending stairs and descending stairs  and relieved by bracing/immobilizing (wears knee protection at work).                              Objective:  Standing Alignment:              Knee:  Genu varus R and genu varus L      Gait:    Weight Bearing Status:  WBAT                                                           Knee Evaluation:  ROM:  PROM: normal  Strength wnl knee: Good QS B; 4/5 glute max R; 5/5 L; 5-/5 Glute med B.            Ligament Testing:  Normal                Special Tests:     Right knee positive for the following tests:  Patellar Compression  Palpation:      Right knee tenderness present at:  Popliteal; Patellar Superior and Patellar  Inferior      Functional Testing:          Quad:    Single Leg Squat:  Left:      Right:        Bilateral Leg Squat:   Excessive anterior knee excursion      Proprioception:   Stork Balance Test: Left:   Right:   % of Uninvolved:  30 sec balance intact B          General     ROS    Assessment/Plan:    Patient is a 60 year old male with right side knee complaints.    Patient has the following significant findings with corresponding treatment plan.                Diagnosis 1:  R knee mechanical knee pain with hx of OA  Pain -  hot/cold therapy, manual therapy, self management, education, directional preference exercise and home program  Decreased strength - therapeutic exercise, therapeutic activities and home program  Impaired gait - gait training and home program    Therapy Evaluation Codes:   Cumulative Therapy Evaluation is: Low complexity.    Previous and current functional limitations:  (See Goal Flow Sheet for this information)    Short term and Long term goals: (See Goal Flow Sheet for this information)     Communication ability:  Patient appears to be able to clearly communicate and understand verbal and written communication and follow directions correctly.  Treatment Explanation - The following has been discussed with the patient:   RX ordered/plan of care  Anticipated outcomes  Possible risks and side effects  This patient would benefit from PT intervention to resume normal activities.   Rehab potential is good.    Frequency:  1 X week, once daily  Duration:  for 4 weeks tapering to 2 X a month over 4 weeks  Discharge Plan:  Achieve all LTG.  Independent in home treatment program.  Reach maximal therapeutic benefit.    Please refer to the daily flowsheet for treatment today, total treatment time and time spent performing 1:1 timed codes.

## 2021-10-02 ENCOUNTER — HEALTH MAINTENANCE LETTER (OUTPATIENT)
Age: 61
End: 2021-10-02

## 2021-10-08 ENCOUNTER — LAB (OUTPATIENT)
Dept: LAB | Facility: CLINIC | Age: 61
End: 2021-10-08
Payer: COMMERCIAL

## 2021-10-08 DIAGNOSIS — D64.9 ANEMIA, UNSPECIFIED TYPE: ICD-10-CM

## 2021-10-08 LAB
HGB BLD-MCNC: 13.1 G/DL (ref 13.3–17.7)
RETICS # AUTO: 0.1 10E6/UL (ref 0.03–0.1)
RETICS/RBC NFR AUTO: 2.3 % (ref 0.5–2)
VIT B12 SERPL-MCNC: 374 PG/ML (ref 193–986)

## 2021-10-08 PROCEDURE — 83010 ASSAY OF HAPTOGLOBIN QUANT: CPT

## 2021-10-08 PROCEDURE — 36415 COLL VENOUS BLD VENIPUNCTURE: CPT

## 2021-10-08 PROCEDURE — 84443 ASSAY THYROID STIM HORMONE: CPT

## 2021-10-08 PROCEDURE — 85045 AUTOMATED RETICULOCYTE COUNT: CPT

## 2021-10-08 PROCEDURE — 86334 IMMUNOFIX E-PHORESIS SERUM: CPT

## 2021-10-08 PROCEDURE — 85018 HEMOGLOBIN: CPT

## 2021-10-08 PROCEDURE — 80053 COMPREHEN METABOLIC PANEL: CPT

## 2021-10-08 PROCEDURE — 83550 IRON BINDING TEST: CPT

## 2021-10-08 PROCEDURE — 82728 ASSAY OF FERRITIN: CPT

## 2021-10-08 PROCEDURE — 82607 VITAMIN B-12: CPT

## 2021-10-08 NOTE — LETTER
October 12, 2021      Ricki Acuna  [address] [place] [address]        Dear ,    The following letter pertains to your most recent diagnostic tests:     The good news is that your follow up potassium level is normal.     The not as good news is that the hemoglobin remain very mildly low.     Extensive testing to understand why the hemoglobin may be a little low (mild anemia) does not reveal an obvious cause.       You had mentioned some bright red blood noted in your stools.       It is conceivable that this contributed to some blood loss and that has resulted in mild anemia.       As we discussed by phone, I think we should do an early colonoscopy to make sure the blood loss is not from a potential dangerous source such as a pre-cancerous colon polyp or a colon or rectal cancer.       I have sent a note to the colonoscopy schedulers to contact you to find a time that works for you for that procedure.     The following website has suggestions for iron rich foods to help your blood count restore to normal:       https://[email or web address]-blood/blood-donation-process/before-during-after/iron-blood-donation/iron-rich-foods.html     Finally, as we discussed, lets check your hemoglobin again in 2 months to make sure it is trending upward.         Resulted Orders   **Hemoglobin FUTURE 14d   Result Value Ref Range    Hemoglobin 13.1 (L) 13.3 - 17.7 g/dL   Comprehensive metabolic panel   Result Value Ref Range    Sodium 139 133 - 144 mmol/L    Potassium 3.5 3.4 - 5.3 mmol/L    Chloride 104 94 - 109 mmol/L    Carbon Dioxide (CO2) 27 20 - 32 mmol/L    Anion Gap 8 3 - 14 mmol/L    Urea Nitrogen 9 7 - 30 mg/dL    Creatinine 0.78 0.66 - 1.25 mg/dL    Calcium 8.3 (L) 8.5 - 10.1 mg/dL    Glucose 117 (H) 70 - 99 mg/dL    Alkaline Phosphatase 92 40 - 150 U/L    AST 19 0 - 45 U/L    ALT 60 0 - 70 U/L    Protein Total 6.7 (L) 6.8 - 8.8 g/dL    Albumin 3.3 (L) 3.4 - 5.0 g/dL    Bilirubin Total 0.3 0.2 -  1.3 mg/dL    GFR Estimate >90 >60 mL/min/1.73m2      Comment:      As of July 11, 2021, eGFR is calculated by the CKD-EPI creatinine equation, without race adjustment. eGFR can be influenced by muscle mass, exercise, and diet. The reported eGFR is an estimation only and is only applicable if the renal function is stable.   Protein Immunofixation Serum   Result Value Ref Range    Immunofixation ELP       No monoclonal protein seen on immunofixation. Pathological significance requires clinical correlation. FENG Germain M.D., Ph.D Pathologist ([phone])   Reticulocyte count   Result Value Ref Range    % Reticulocyte 2.3 (H) 0.5 - 2.0 %    Absolute Reticulocyte 0.103 (H) 0.025 - 0.095 10e6/uL   Haptoglobin   Result Value Ref Range    Haptoglobin 237 (H) 32 - 197 mg/dL   Vitamin B12   Result Value Ref Range    Vitamin B12 374 193 - 986 pg/mL   Iron and iron binding capacity   Result Value Ref Range    Iron 48 35 - 180 ug/dL    Iron Binding Capacity 339 240 - 430 ug/dL    Iron Sat Index 14 (L) 15 - 46 %   TSH with free T4 reflex   Result Value Ref Range    TSH 0.63 0.40 - 4.00 mU/L       If you have any questions or concerns, please call the clinic at the number listed above.       Sincerely,      Bryan Fragoso MD      cade

## 2021-10-10 LAB
ALBUMIN SERPL-MCNC: 3.3 G/DL (ref 3.4–5)
ALP SERPL-CCNC: 92 U/L (ref 40–150)
ALT SERPL W P-5'-P-CCNC: 60 U/L (ref 0–70)
ANION GAP SERPL CALCULATED.3IONS-SCNC: 8 MMOL/L (ref 3–14)
AST SERPL W P-5'-P-CCNC: 19 U/L (ref 0–45)
BILIRUB SERPL-MCNC: 0.3 MG/DL (ref 0.2–1.3)
BUN SERPL-MCNC: 9 MG/DL (ref 7–30)
CALCIUM SERPL-MCNC: 8.3 MG/DL (ref 8.5–10.1)
CHLORIDE BLD-SCNC: 104 MMOL/L (ref 94–109)
CO2 SERPL-SCNC: 27 MMOL/L (ref 20–32)
CREAT SERPL-MCNC: 0.78 MG/DL (ref 0.66–1.25)
GFR SERPL CREATININE-BSD FRML MDRD: >90 ML/MIN/1.73M2
GLUCOSE BLD-MCNC: 117 MG/DL (ref 70–99)
IRON SATN MFR SERPL: 14 % (ref 15–46)
IRON SERPL-MCNC: 48 UG/DL (ref 35–180)
POTASSIUM BLD-SCNC: 3.5 MMOL/L (ref 3.4–5.3)
PROT SERPL-MCNC: 6.7 G/DL (ref 6.8–8.8)
SODIUM SERPL-SCNC: 139 MMOL/L (ref 133–144)
TIBC SERPL-MCNC: 339 UG/DL (ref 240–430)
TSH SERPL DL<=0.005 MIU/L-ACNC: 0.63 MU/L (ref 0.4–4)

## 2021-10-11 ENCOUNTER — LAB (OUTPATIENT)
Dept: LAB | Facility: CLINIC | Age: 61
End: 2021-10-11
Payer: COMMERCIAL

## 2021-10-11 ENCOUNTER — TELEPHONE (OUTPATIENT)
Dept: FAMILY MEDICINE | Facility: CLINIC | Age: 61
End: 2021-10-11

## 2021-10-11 DIAGNOSIS — D64.9 ANEMIA, UNSPECIFIED TYPE: ICD-10-CM

## 2021-10-11 LAB
HAPTOGLOB SERPL-MCNC: 237 MG/DL (ref 32–197)
LDH SERPL L TO P-CCNC: 141 U/L (ref 85–227)
PROT PATTERN SERPL IFE-IMP: NORMAL

## 2021-10-11 PROCEDURE — 36415 COLL VENOUS BLD VENIPUNCTURE: CPT

## 2021-10-11 PROCEDURE — 83615 LACTATE (LD) (LDH) ENZYME: CPT

## 2021-10-11 NOTE — TELEPHONE ENCOUNTER
Call from patient who reports he has received many lab tests and he feels anxious about the results. RN relayed that results are often released prior to the provider reviewing the results. He verbalizes understanding.     He had particular concern re: Protein Immunofixation which was reviewed by pathologist.     Routing to provider to review/advise on labs. Yaphie message response is okay for patient.

## 2021-10-12 LAB — FERRITIN SERPL-MCNC: 54 NG/ML (ref 26–388)

## 2021-10-12 NOTE — RESULT ENCOUNTER NOTE
Can you call him to help him schedule lab appointment in 2 month for hemoglobin recheck (he is expecting a call)

## 2021-10-12 NOTE — RESULT ENCOUNTER NOTE
The following letter pertains to your most recent diagnostic tests:    The good news is that your follow up potassium level is normal.     The not as good news is that the hemoglobin remain very mildly low.    Extensive testing to understand why the hemoglobin may be a little low (mild anemia) does not reveal an obvious cause.      You had mentioned some bright red blood noted in your stools.      It is conceivable that this contributed to some blood loss and that has resulted in mild anemia.      As we discussed by phone, I think we should do an early colonoscopy to make sure the blood loss is not from a potential dangerous source such as a pre-cancerous colon polyp or a colon or rectal cancer.       I have sent a note to the colonoscopy schedulers to contact you to find a time that works for you for that procedure.     The following website has suggestions for iron rich foods to help your blood count restore to normal:      https://[email or web address]/donate-blood/blood-donation-process/before-during-after/iron-blood-donation/iron-rich-foods.html    Finally, as we discussed, lets check your hemoglobin again in 2 months to make sure it is trending upward.      Sincerely,    Bryan Fragoso MD      Sincerely,    Dr. Fragoso

## 2021-10-15 ENCOUNTER — TELEPHONE (OUTPATIENT)
Dept: GASTROENTEROLOGY | Facility: CLINIC | Age: 61
End: 2021-10-15

## 2021-10-15 DIAGNOSIS — Z11.59 ENCOUNTER FOR SCREENING FOR OTHER VIRAL DISEASES: ICD-10-CM

## 2021-10-15 NOTE — TELEPHONE ENCOUNTER
Screening Questions  1. Are you active on mychart? Yes     2. What insurance is in the chart? PO     2.  Ordering/Referring Provider: Bryan Fragoso MD    3. BMI 34.4N    4. Do you have any Lung issues?  N If yes continue:   Do you use daily home oxygen?    Do you have Pulmonary Hypertension? N   Do you have SEVERE asthma? **    5. Have you had a heart, lung, or liver transplant? N    6. Are you currently on dialysis or have chronic kidney disease? N    7. Have you had a stroke or Transient ischemic attack (TIA) within 6 months? N    8. In the past 6 months, have you had any heart related issues including cardiomyopathy or heart attack? N      If yes, did it require cardiac stenting or other implantable device?N      9. Do you have any implantable devices in your body (pacemaker, defib, LVAD)? N    10. Do you take nitroglycerin? If yes, how often? N    11. Are you currently taking any blood thinners? N  -- BABY ASPRINS     12. Are you a diabetic? N    13. (Females) Are you currently pregnant?   If yes, how many weeks?      15. Are you taking any prescription pain medications on a routine schedule? N If yes, MAC sedation.    16. Do you have any chemical dependencies such as alcohol, street drugs, or methadone? NIf yes, MAC sedation.    17. Do you have any history of post-traumatic stress syndrome, severe anxiety or history of psychosis? N    18. Do you transfer independently? Y    19.  Do you have any issues with constipation? N    20. Preferred Pharmacy for Pre Prescription  [facility]/ [facility]     Scheduling Details    Which Colonoscopy Prep was Sent?:    Procedure Scheduled: Colonsocopy  Provider/Surgeon: Maribeth   Date of Procedure: 10/27/2021  Location:  GI   Caller (Please ask for phone number if not scheduled by patient): Self/ Ricki Pose       Sedation Type: CS   Conscious Sedation- Needs  for 6 hours after the procedure  MAC/General-Needs  for 24 hours after procedure    Pre-op Required  at [facility], [facility] and OR for MAC sedation:   (if yes advise patient they will need a pre-op prior to procedure)      Is patient on blood thinners? N (If yes- inform patient to follow up with PCP or provider for follow up instructions)     Informed patient they will need an adult  Y  Cannot take any type of public or medical transportation alone    Pre-Procedure Covid test to be completed at [facility] or Externally: Y    Confirmed Nurse will call to complete assessment Y    Additional comments: N

## 2021-10-19 ENCOUNTER — THERAPY VISIT (OUTPATIENT)
Dept: PHYSICAL THERAPY | Facility: CLINIC | Age: 61
End: 2021-10-19
Payer: COMMERCIAL

## 2021-10-19 DIAGNOSIS — M17.11 PRIMARY OSTEOARTHRITIS OF RIGHT KNEE: Primary | ICD-10-CM

## 2021-10-19 DIAGNOSIS — M25.561 CHRONIC PAIN OF RIGHT KNEE: ICD-10-CM

## 2021-10-19 DIAGNOSIS — G89.29 CHRONIC PAIN OF RIGHT KNEE: ICD-10-CM

## 2021-10-19 PROCEDURE — 97110 THERAPEUTIC EXERCISES: CPT | Mod: GP | Performed by: PHYSICAL THERAPIST

## 2021-10-26 ENCOUNTER — LAB (OUTPATIENT)
Dept: LAB | Facility: CLINIC | Age: 61
End: 2021-10-26
Payer: COMMERCIAL

## 2021-10-26 DIAGNOSIS — Z11.59 ENCOUNTER FOR SCREENING FOR OTHER VIRAL DISEASES: ICD-10-CM

## 2021-10-26 LAB — SARS-COV-2 RNA RESP QL NAA+PROBE: NEGATIVE

## 2021-10-26 PROCEDURE — U0003 INFECTIOUS AGENT DETECTION BY NUCLEIC ACID (DNA OR RNA); SEVERE ACUTE RESPIRATORY SYNDROME CORONAVIRUS 2 (SARS-COV-2) (CORONAVIRUS DISEASE [COVID-19]), AMPLIFIED PROBE TECHNIQUE, MAKING USE OF HIGH THROUGHPUT TECHNOLOGIES AS DESCRIBED BY CMS-2020-01-R: HCPCS

## 2021-10-26 PROCEDURE — U0005 INFEC AGEN DETEC AMPLI PROBE: HCPCS

## 2021-10-27 ENCOUNTER — HOSPITAL ENCOUNTER (OUTPATIENT)
Facility: CLINIC | Age: 61
Discharge: HOME | End: 2021-10-27
Attending: COLON & RECTAL SURGERY | Admitting: COLON & RECTAL SURGERY
Payer: COMMERCIAL

## 2021-10-27 VITALS
SYSTOLIC BLOOD PRESSURE: 137 MMHG | RESPIRATION RATE: 18 BRPM | HEART RATE: 71 BPM | OXYGEN SATURATION: 93 % | HEIGHT: 73 IN | WEIGHT: 261 LBS | BODY MASS INDEX: 34.59 KG/M2 | DIASTOLIC BLOOD PRESSURE: 96 MMHG | TEMPERATURE: 97.7 F

## 2021-10-27 LAB — COLONOSCOPY: NORMAL

## 2021-10-27 PROCEDURE — 250N000011 HC RX IP 250 OP 636: Performed by: COLON & RECTAL SURGERY

## 2021-10-27 PROCEDURE — G0500 MOD SEDAT ENDO SERVICE >5YRS: HCPCS | Performed by: COLON & RECTAL SURGERY

## 2021-10-27 PROCEDURE — 45385 COLONOSCOPY W/LESION REMOVAL: CPT | Performed by: COLON & RECTAL SURGERY

## 2021-10-27 PROCEDURE — 88305 TISSUE EXAM BY PATHOLOGIST: CPT | Mod: TC | Performed by: COLON & RECTAL SURGERY

## 2021-10-27 RX ORDER — FENTANYL CITRATE 50 UG/ML
INJECTION, SOLUTION INTRAMUSCULAR; INTRAVENOUS PRN
Status: COMPLETED | OUTPATIENT
Start: 2021-10-27 | End: 2021-10-27

## 2021-10-27 RX ORDER — LIDOCAINE 40 MG/G
CREAM TOPICAL
Status: DISCONTINUED | OUTPATIENT
Start: 2021-10-27 | End: 2021-10-27 | Stop reason: HOSPADM

## 2021-10-27 RX ORDER — ONDANSETRON 2 MG/ML
4 INJECTION INTRAMUSCULAR; INTRAVENOUS
Status: DISCONTINUED | OUTPATIENT
Start: 2021-10-27 | End: 2021-10-27 | Stop reason: HOSPADM

## 2021-10-27 RX ADMIN — MIDAZOLAM 2 MG: 1 INJECTION INTRAMUSCULAR; INTRAVENOUS at 07:40

## 2021-10-27 RX ADMIN — FENTANYL CITRATE 100 MCG: 50 INJECTION, SOLUTION INTRAMUSCULAR; INTRAVENOUS at 07:40

## 2021-10-27 ASSESSMENT — MIFFLIN-ST. JEOR: SCORE: 2057.77

## 2021-10-27 NOTE — H&P
Colon & Rectal Surgery History and Physical  Pre-Endoscopy Procedure Note    History of Present Illness   I have been asked by Dr. Fragoso to evaluate this 60 year old male for unexplained anemia. He had two hyperplastic polyps on colonoscopy in 2019 and adenomatous polyps on colonoscopy in 2015. He currently denies any abdominal pain, weight loss, bleeding per rectum, or recent change in bowel habits.    Past Medical History  Diagnosis Date     Adenomatous colon polyp 3-20-15    repeat in 3 years      Hypertension      Primary osteoarthritis of right knee 9/28/2021     Skin cancer     Dr. Antoine Aguirre MD       Past Surgical History  Procedure Laterality Date     CHOLECYSTECTOMY      open     HERNIA REPAIR      inguinal hernia repair        Medications  Medication Sig     aspirin 81 MG tablet Take by mouth daily     lisinopril-hydrochlorothiazide (ZESTORETIC) 20-12.5 MG tablet TAKE 1 TABLET BY MOUTH DAILY     potassium chloride ER (KLOR-CON M) 10 MEQ CR tablet Take 1 tablet (10 mEq) by mouth daily       Allergies   No Known Allergies     Family History   Family history includes Cerebrovascular Disease in his mother; Diabetes in his mother; Prostate Cancer in his father.     Social History   He reports that he quit smoking about 21 months ago. His smoking use included cigarettes. He smoked 0.50 packs per day for 0.00 years. He has never used smokeless tobacco. He reports current alcohol use of about 7.0 standard drinks of alcohol per week. He reports that he does not use drugs.    Review of Systems   Constitutional:  No fever, weight change or fatigue.    Eyes:     No dry eyes or vision changes.   Ears/Nose/Throat/Neck:  No oral ulcers, sore throat or voice change.    Cardiovascular:   No palpitations, syncope, angina or edema.   Respiratory:    No chest pain, excessive sleepiness, shortness of breath or hemoptysis.    Gastrointestinal:   No abdominal pain, nausea, vomiting, diarrhea or heartburn.  "   Genitourinary:   No dysuria, hematuria, urinary retention or urinary frequency.   Musculoskeletal:  No joint swelling or arthralgias.    Dermatologic:  No skin rash or other skin changes.   Neurologic:    No focal weakness or numbness. No neuropathy.   Psychiatric:    No depression, anxiety, suicidal ideation, or paranoid ideation.   Endocrine:   No cold or heat intolerance, polydipsia, hirsutism, change in libido, or flushing.   Hematology/Lymphatic:  No bleeding or lymphadenopathy.    Allergy/Immunology:  No rhinitis or hives.     Physical Exam   Vitals:  /101, HR 64, temperature 97.7  F (36.5  C), temperature source Temporal, RR 16, height 1.854 m (6' 1\"), weight 118.4 kg (261 lb), SpO2 97 %.    General:  Alert and oriented to person, place and time   Airway: Normal oropharyngeal airway and neck mobility   Lungs:  Clear bilaterally   Heart:  Regular rate and rhythm   Abdomen: Soft, NT, ND, no masses   Extremities: Warm, good capillary refill    ASA Grade: II (mild systemic disease)    Impression: Cleared for use of conscious sedation for evaluation of anemia    Plan: Proceed with colonoscopy     Rocio Stahl MD  [facility]  [phone]  "

## 2021-10-28 LAB
PATH REPORT.COMMENTS IMP SPEC: NORMAL
PATH REPORT.COMMENTS IMP SPEC: NORMAL
PATH REPORT.FINAL DX SPEC: NORMAL
PATH REPORT.GROSS SPEC: NORMAL
PATH REPORT.MICROSCOPIC SPEC OTHER STN: NORMAL
PATH REPORT.RELEVANT HX SPEC: NORMAL
PHOTO IMAGE: NORMAL

## 2021-10-28 PROCEDURE — 88305 TISSUE EXAM BY PATHOLOGIST: CPT | Mod: 26 | Performed by: PATHOLOGY

## 2021-11-05 ENCOUNTER — THERAPY VISIT (OUTPATIENT)
Dept: PHYSICAL THERAPY | Facility: CLINIC | Age: 61
End: 2021-11-05
Payer: COMMERCIAL

## 2021-11-05 DIAGNOSIS — M17.11 PRIMARY OSTEOARTHRITIS OF RIGHT KNEE: Primary | ICD-10-CM

## 2021-11-05 DIAGNOSIS — M25.561 CHRONIC PAIN OF RIGHT KNEE: ICD-10-CM

## 2021-11-05 DIAGNOSIS — G89.29 CHRONIC PAIN OF RIGHT KNEE: ICD-10-CM

## 2021-11-05 PROCEDURE — 97110 THERAPEUTIC EXERCISES: CPT | Mod: GP | Performed by: PHYSICAL THERAPIST

## 2021-11-22 ENCOUNTER — THERAPY VISIT (OUTPATIENT)
Dept: PHYSICAL THERAPY | Facility: CLINIC | Age: 61
End: 2021-11-22
Payer: COMMERCIAL

## 2021-11-22 DIAGNOSIS — M25.561 CHRONIC PAIN OF RIGHT KNEE: ICD-10-CM

## 2021-11-22 DIAGNOSIS — M17.11 PRIMARY OSTEOARTHRITIS OF RIGHT KNEE: Primary | ICD-10-CM

## 2021-11-22 DIAGNOSIS — G89.29 CHRONIC PAIN OF RIGHT KNEE: ICD-10-CM

## 2021-11-22 PROCEDURE — 97110 THERAPEUTIC EXERCISES: CPT | Mod: GP | Performed by: PHYSICAL THERAPIST

## 2021-11-22 PROCEDURE — 97112 NEUROMUSCULAR REEDUCATION: CPT | Mod: GP | Performed by: PHYSICAL THERAPIST

## 2021-11-22 ASSESSMENT — ACTIVITIES OF DAILY LIVING (ADL)
PAIN: I DO NOT HAVE THE SYMPTOM
SQUAT: ACTIVITY IS NOT DIFFICULT
STIFFNESS: I DO NOT HAVE THE SYMPTOM
WALK: ACTIVITY IS NOT DIFFICULT
KNEE_ACTIVITY_OF_DAILY_LIVING_SUM: 68
SIT WITH YOUR KNEE BENT: ACTIVITY IS NOT DIFFICULT
LIMPING: I DO NOT HAVE THE SYMPTOM
RAW_SCORE: 68
WEAKNESS: I DO NOT HAVE THE SYMPTOM
AS_A_RESULT_OF_YOUR_KNEE_INJURY,_HOW_WOULD_YOU_RATE_YOUR_CURRENT_LEVEL_OF_DAILY_ACTIVITY?: NEARLY NORMAL
STAND: ACTIVITY IS NOT DIFFICULT
KNEEL ON THE FRONT OF YOUR KNEE: ACTIVITY IS MINIMALLY DIFFICULT
GIVING WAY, BUCKLING OR SHIFTING OF KNEE: I DO NOT HAVE THE SYMPTOM
SWELLING: I DO NOT HAVE THE SYMPTOM
HOW_WOULD_YOU_RATE_THE_CURRENT_FUNCTION_OF_YOUR_KNEE_DURING_YOUR_USUAL_DAILY_ACTIVITIES_ON_A_SCALE_FROM_0_TO_100_WITH_100_BEING_YOUR_LEVEL_OF_KNEE_FUNCTION_PRIOR_TO_YOUR_INJURY_AND_0_BEING_THE_INABILITY_TO_PERFORM_ANY_OF_YOUR_USUAL_DAILY_ACTIVITIES?: 90
GO DOWN STAIRS: ACTIVITY IS NOT DIFFICULT
GO UP STAIRS: ACTIVITY IS NOT DIFFICULT
RISE FROM A CHAIR: ACTIVITY IS MINIMALLY DIFFICULT
HOW_WOULD_YOU_RATE_THE_OVERALL_FUNCTION_OF_YOUR_KNEE_DURING_YOUR_USUAL_DAILY_ACTIVITIES?: NEARLY NORMAL
KNEE_ACTIVITY_OF_DAILY_LIVING_SCORE: 97.14

## 2021-12-06 ENCOUNTER — IMMUNIZATION (OUTPATIENT)
Dept: NURSING | Facility: CLINIC | Age: 61
End: 2021-12-06
Payer: COMMERCIAL

## 2021-12-06 PROCEDURE — 91300 PR COVID VAC PFIZER DIL RECON 30 MCG/0.3 ML IM: CPT

## 2021-12-06 PROCEDURE — 0004A PR COVID VAC PFIZER DIL RECON 30 MCG/0.3 ML IM: CPT

## 2021-12-13 ENCOUNTER — MYC MEDICAL ADVICE (OUTPATIENT)
Dept: FAMILY MEDICINE | Facility: CLINIC | Age: 61
End: 2021-12-13

## 2021-12-13 ENCOUNTER — LAB (OUTPATIENT)
Dept: LAB | Facility: CLINIC | Age: 61
End: 2021-12-13
Payer: COMMERCIAL

## 2021-12-13 DIAGNOSIS — E87.6 HYPOKALEMIA: Primary | ICD-10-CM

## 2021-12-13 DIAGNOSIS — D50.0 IRON DEFICIENCY ANEMIA DUE TO CHRONIC BLOOD LOSS: ICD-10-CM

## 2021-12-13 DIAGNOSIS — E87.6 HYPOKALEMIA: ICD-10-CM

## 2021-12-13 LAB
HGB BLD-MCNC: 13.6 G/DL (ref 13.3–17.7)
IRON SATN MFR SERPL: 18 % (ref 15–46)
IRON SERPL-MCNC: 64 UG/DL (ref 35–180)
TIBC SERPL-MCNC: 363 UG/DL (ref 240–430)

## 2021-12-13 PROCEDURE — 84132 ASSAY OF SERUM POTASSIUM: CPT

## 2021-12-13 PROCEDURE — 36415 COLL VENOUS BLD VENIPUNCTURE: CPT

## 2021-12-13 PROCEDURE — 85018 HEMOGLOBIN: CPT

## 2021-12-13 PROCEDURE — 83550 IRON BINDING TEST: CPT

## 2021-12-13 NOTE — LETTER
December 15, 2021      Ricki Acuna  [address] [place] [address]        Dear ,    We are writing to inform you of your test results.    The following letter pertains to your most recent diagnostic tests:     The potassium is OK.        Resulted Orders   Hemoglobin   Result Value Ref Range    Hemoglobin 13.6 13.3 - 17.7 g/dL   Iron and iron binding capacity   Result Value Ref Range    Iron 64 35 - 180 ug/dL    Iron Binding Capacity 363 240 - 430 ug/dL    Iron Sat Index 18 15 - 46 %   Potassium   Result Value Ref Range    Potassium 4.2 3.4 - 5.3 mmol/L       If you have any questions or concerns, please call the clinic at the number listed above.       Sincerely,      Bryan Fragoso MD

## 2021-12-13 NOTE — LETTER
December 14, 2021      Ricki Jose Armand  [address] [place] [address]        Dear ,    The following letter pertains to your most recent diagnostic tests:     Good news! The iron tests are now normal and the hemoglobin is back to normal.           Sincerely,     Dr. Fragoso      Resulted Orders   Hemoglobin   Result Value Ref Range    Hemoglobin 13.6 13.3 - 17.7 g/dL   Iron and iron binding capacity   Result Value Ref Range    Iron 64 35 - 180 ug/dL    Iron Binding Capacity 363 240 - 430 ug/dL    Iron Sat Index 18 15 - 46 %

## 2021-12-13 NOTE — RESULT ENCOUNTER NOTE
The following letter pertains to your most recent diagnostic tests:    Good news! The iron tests are now normal and the hemoglobin is back to normal.          Sincerely,    Dr. Fragoso

## 2021-12-14 LAB — POTASSIUM BLD-SCNC: 4.2 MMOL/L (ref 3.4–5.3)

## 2021-12-14 NOTE — TELEPHONE ENCOUNTER
PCP see below     No recent potassium checked, does pt need level drawn?     Potassium   Date Value Ref Range Status   10/08/2021 3.5 3.4 - 5.3 mmol/L Final   08/31/2020 3.9 3.4 - 5.3 mmol/L Final

## 2021-12-15 NOTE — RESULT ENCOUNTER NOTE
The following letter pertains to your most recent diagnostic tests:    The potassium is OK      Sincerely,    Dr. Fragoso

## 2022-04-16 ENCOUNTER — TRANSFERRED RECORDS (OUTPATIENT)
Dept: HEALTH INFORMATION MANAGEMENT | Facility: CLINIC | Age: 62
End: 2022-04-16
Payer: COMMERCIAL

## 2022-05-28 ENCOUNTER — TRANSFERRED RECORDS (OUTPATIENT)
Dept: HEALTH INFORMATION MANAGEMENT | Facility: CLINIC | Age: 62
End: 2022-05-28
Payer: COMMERCIAL

## 2022-08-15 NOTE — LETTER
"[facility]  [address] [facility]  [address]  [place]  [address]  Tel: [phone]    September 17, 2019    Ricki Acuna  [address]  [facility] [address]        Dear Mr. Acuna,    The following letter pertains to your most recent diagnostic tests:     Good news! Lung cancer screening CT is normal.  Let's recheck in one year.  Work on quitting smoking.     If you have any further questions or problems, please contact our office.      Sincerely,    Bryan Fragoso MD          Enclosure: Lab Results  Results for orders placed or performed during the hospital encounter of 09/16/19   CT Chest Lung Cancer Scrn Low Dose wo    Narrative    CT CHEST LOW DOSE  WITHOUT CONTRAST September 16, 2019 7:45 AM     HISTORY: Tobacco use disorder.    COMPARISON: August 23, 2018.    TECHNIQUE: Scans obtained from the apices through the diaphragm  without IV contrast. Low dose CT chest technique was utilized.  Radiation dose for this scan was reduced using automated exposure  control, adjustment of the mA and/or kV according to patient size, or  iterative reconstruction technique.    FINDINGS:     Lungs: Stable scattered sub-4 mm nodules, likely granulomas. Stable  small fissural nodule on the right. No new nodules.    Additional findings: No pleural or pericardial effusion. Normal  caliber thoracic aorta.  No coronary calcifications demonstrated.   Normal heart size. Adrenal glands unremarkable. Stable liver cyst.  Remaining visualized upper abdomen unremarkable. No frankly  destructive bony lesions.      Impression    IMPRESSION:   1. ACR Assessment Category:  Lung-RADS Category 2. Benign appearance  or behavior. Recommendation: continue annual screening if clinically  relevant (please order exam code IMG 2290).    2. Significant Incidental Finding(s):  Category S: No.    Download the \"LungRADS Assessment Categories\" table at this site:   http://www.acr.org/Quality-Safety/Resources/LungRADS    NATASHA SANDHU MD       "
[de-identified] : will inject with cortisone for pain, start PT. Will see how he progresses. Possibility of shoulder surgery in future discussed

## 2022-09-03 ENCOUNTER — HEALTH MAINTENANCE LETTER (OUTPATIENT)
Age: 62
End: 2022-09-03

## 2022-09-08 DIAGNOSIS — I10 BENIGN ESSENTIAL HYPERTENSION: ICD-10-CM

## 2022-09-09 RX ORDER — POTASSIUM CHLORIDE 750 MG/1
10 TABLET, EXTENDED RELEASE ORAL DAILY
Qty: 30 TABLET | Refills: 0 | Status: SHIPPED | OUTPATIENT
Start: 2022-09-09 | End: 2022-09-22

## 2022-09-09 RX ORDER — LISINOPRIL AND HYDROCHLOROTHIAZIDE 12.5; 2 MG/1; MG/1
1 TABLET ORAL DAILY
Qty: 30 TABLET | Refills: 0 | Status: SHIPPED | OUTPATIENT
Start: 2022-09-09 | End: 2022-09-22

## 2022-09-09 NOTE — TELEPHONE ENCOUNTER
Routing refill request to provider for review/approval because:  Failed protocol due to:   BP Readings from Last 3 Encounters:   10/27/21 (!) 137/96   09/15/21 128/88   08/31/20 126/82     Patient scheduled for an appt 9/22/22-meds pended for 30 days.    Nablia Raines RN

## 2022-09-09 NOTE — TELEPHONE ENCOUNTER
Received a call from the patient stating he is needing a refill of both his Lisinopril-Hydrochlorothiazide and Potassium Chloride. Patient is scheduled for an appointment with Dr. Fragoso 9/22/22.     Medications pended for review and routing to refill pool for review.     Nabila Raines RN

## 2022-09-22 ENCOUNTER — OFFICE VISIT (OUTPATIENT)
Dept: FAMILY MEDICINE | Facility: CLINIC | Age: 62
End: 2022-09-22
Payer: COMMERCIAL

## 2022-09-22 VITALS
BODY MASS INDEX: 34.37 KG/M2 | OXYGEN SATURATION: 98 % | HEART RATE: 76 BPM | HEIGHT: 73 IN | WEIGHT: 259.3 LBS | SYSTOLIC BLOOD PRESSURE: 136 MMHG | DIASTOLIC BLOOD PRESSURE: 80 MMHG

## 2022-09-22 DIAGNOSIS — D50.9 IRON DEFICIENCY ANEMIA, UNSPECIFIED IRON DEFICIENCY ANEMIA TYPE: ICD-10-CM

## 2022-09-22 DIAGNOSIS — E66.01 MORBID OBESITY (H): ICD-10-CM

## 2022-09-22 DIAGNOSIS — Z00.00 ROUTINE GENERAL MEDICAL EXAMINATION AT A HEALTH CARE FACILITY: Primary | ICD-10-CM

## 2022-09-22 DIAGNOSIS — L57.0 ACTINIC KERATOSIS: ICD-10-CM

## 2022-09-22 DIAGNOSIS — E78.5 HYPERLIPIDEMIA LDL GOAL <100: ICD-10-CM

## 2022-09-22 DIAGNOSIS — D64.9 ANEMIA, UNSPECIFIED TYPE: ICD-10-CM

## 2022-09-22 DIAGNOSIS — I10 BENIGN ESSENTIAL HYPERTENSION: ICD-10-CM

## 2022-09-22 DIAGNOSIS — Z87.891 PERSONAL HISTORY OF TOBACCO USE: ICD-10-CM

## 2022-09-22 DIAGNOSIS — L40.9 PSORIASIS: ICD-10-CM

## 2022-09-22 LAB
ERYTHROCYTE [DISTWIDTH] IN BLOOD BY AUTOMATED COUNT: 12.7 % (ref 10–15)
HCT VFR BLD AUTO: 39.8 % (ref 40–53)
HGB BLD-MCNC: 12.9 G/DL (ref 13.3–17.7)
MCH RBC QN AUTO: 28.4 PG (ref 26.5–33)
MCHC RBC AUTO-ENTMCNC: 32.4 G/DL (ref 31.5–36.5)
MCV RBC AUTO: 88 FL (ref 78–100)
PLATELET # BLD AUTO: 344 10E3/UL (ref 150–450)
RBC # BLD AUTO: 4.55 10E6/UL (ref 4.4–5.9)
WBC # BLD AUTO: 8.6 10E3/UL (ref 4–11)

## 2022-09-22 PROCEDURE — 99396 PREV VISIT EST AGE 40-64: CPT | Mod: 25 | Performed by: INTERNAL MEDICINE

## 2022-09-22 PROCEDURE — 80053 COMPREHEN METABOLIC PANEL: CPT | Performed by: INTERNAL MEDICINE

## 2022-09-22 PROCEDURE — 80061 LIPID PANEL: CPT | Performed by: INTERNAL MEDICINE

## 2022-09-22 PROCEDURE — 36415 COLL VENOUS BLD VENIPUNCTURE: CPT | Performed by: INTERNAL MEDICINE

## 2022-09-22 PROCEDURE — G0103 PSA SCREENING: HCPCS | Performed by: INTERNAL MEDICINE

## 2022-09-22 PROCEDURE — 85027 COMPLETE CBC AUTOMATED: CPT | Performed by: INTERNAL MEDICINE

## 2022-09-22 PROCEDURE — 82728 ASSAY OF FERRITIN: CPT | Performed by: INTERNAL MEDICINE

## 2022-09-22 PROCEDURE — G0296 VISIT TO DETERM LDCT ELIG: HCPCS | Performed by: INTERNAL MEDICINE

## 2022-09-22 PROCEDURE — 83550 IRON BINDING TEST: CPT | Performed by: INTERNAL MEDICINE

## 2022-09-22 PROCEDURE — 17000 DESTRUCT PREMALG LESION: CPT | Performed by: INTERNAL MEDICINE

## 2022-09-22 RX ORDER — LISINOPRIL AND HYDROCHLOROTHIAZIDE 12.5; 2 MG/1; MG/1
1 TABLET ORAL DAILY
Qty: 90 TABLET | Refills: 3 | Status: SHIPPED | OUTPATIENT
Start: 2022-09-22 | End: 2022-09-23

## 2022-09-22 RX ORDER — POTASSIUM CHLORIDE 750 MG/1
10 TABLET, EXTENDED RELEASE ORAL DAILY
Qty: 90 TABLET | Refills: 3 | Status: SHIPPED | OUTPATIENT
Start: 2022-09-22 | End: 2022-09-23

## 2022-09-22 RX ORDER — CLOBETASOL PROPIONATE 0.5 MG/G
CREAM TOPICAL 2 TIMES DAILY
Qty: 60 G | Refills: 11 | Status: SHIPPED | OUTPATIENT
Start: 2022-09-22 | End: 2024-08-05

## 2022-09-22 ASSESSMENT — PAIN SCALES - GENERAL: PAINLEVEL: NO PAIN (0)

## 2022-09-22 NOTE — PROGRESS NOTES
SUBJECTIVE:   CC: Ricki is an 61 year old who presents for preventative health visit.     Patient has been advised of split billing requirements and indicates understanding: Yes     HPI        Here for preventive exam   Scaly skin rash on gluteal cleft intermittently for years  Scaly papule on left forehead similar to previous SK treated with liquid nitrogen by dermatologist  He cannot get a prompt appointment with dermatologist     Today's PHQ-2 Score:   PHQ-2 (  [employer]) 2022   Q1: Little interest or pleasure in doing things 0   Q2: Feeling down, depressed or hopeless 0   PHQ-2 Score 0   PHQ-2 Total Score (12-17 Years)- Positive if 3 or more points; Administer PHQ-A if positive -   Q1: Little interest or pleasure in doing things -   Q2: Feeling down, depressed or hopeless -   PHQ-2 Score -       Abuse: Current or Past(Physical, Sexual or Emotional)- No  Do you feel safe in your environment? Yes        Social History     Tobacco Use     Smoking status: Former Smoker     Packs/day: 0.50     Years: 0.00     Pack years: 0.00     Types: Cigarettes     Quit date: 2020     Years since quittin.7     Smokeless tobacco: Never Used   Substance Use Topics     Alcohol use: Yes     Alcohol/week: 7.0 standard drinks     Comment: 2 drinks a week     If you drink alcohol do you typically have >3 drinks per day or >7 drinks per week? No    No flowsheet data found.    Last PSA:   PSA   Date Value Ref Range Status   2020 1.51 0 - 4 ug/L Final     Comment:     Assay Method:  Chemiluminescence using Siemens Vista analyzer     Prostate Specific Antigen Screen   Date Value Ref Range Status   09/15/2021 2.14 0.00 - 4.00 ug/L Final       Reviewed orders with patient. Reviewed health maintenance and updated orders accordingly - Yes  Patient Active Problem List   Diagnosis     Benign essential hypertension     Adenomatous colon polyp     Hyperlipidemia LDL goal <100     Morbid obesity (H)     Primary osteoarthritis of  right knee     Past Surgical History:   Procedure Laterality Date     ABDOMEN SURGERY       CHOLECYSTECTOMY      open     COLONOSCOPY  3/20/15    hx polyps     COLONOSCOPY N/A 3/20/2015    Procedure: COMBINED COLONOSCOPY, SINGLE OR MULTIPLE BIOPSY/POLYPECTOMY BY BIOPSY;  Surgeon: Chun Joe MD;  Location:  GI     COLONOSCOPY N/A 10/27/2021    Procedure: COLONOSCOPY, FLEXIBLE, WITH LESION REMOVAL USING SNARE;  Surgeon: Rocio Stahl MD;  Location:  GI     HERNIA REPAIR      inguinal hernia repair       Social History     Tobacco Use     Smoking status: Former Smoker     Packs/day: 0.50     Years: 0.00     Pack years: 0.00     Types: Cigarettes     Quit date: 2020     Years since quittin.7     Smokeless tobacco: Never Used   Substance Use Topics     Alcohol use: Yes     Alcohol/week: 7.0 standard drinks     Comment: 2 drinks a week     Family History   Problem Relation Age of Onset     Prostate Cancer Father      Cerebrovascular Disease Mother      Diabetes Mother          Current Outpatient Medications   Medication Sig Dispense Refill     aspirin 81 MG tablet Take by mouth daily 30 tablet      clobetasol (TEMOVATE) 0.05 % external cream Apply topically 2 times daily Apply to bottom rash sparingly for no longer than 14 days without a several day break 60 g 11     lisinopril-hydrochlorothiazide (ZESTORETIC) 20-12.5 MG tablet Take 1 tablet by mouth daily 90 tablet 3     potassium chloride ER (KLOR-CON M) 10 MEQ CR tablet Take 1 tablet (10 mEq) by mouth daily 90 tablet 3     No Known Allergies    Reviewed and updated as needed this visit by clinical staff   Tobacco  Allergies  Meds                Reviewed and updated as needed this visit by Provider                   Past Medical History:   Diagnosis Date     Adenomatous colon polyp 3-20-15    repeat in 3 years      Hypertension      Primary osteoarthritis of right knee 2021     Skin cancer     Dr. Antoine Aguirre MD DERMATOLOGY  "SPECIALIST       Past Surgical History:   Procedure Laterality Date     ABDOMEN SURGERY       CHOLECYSTECTOMY      open     COLONOSCOPY  3/20/15    hx polyps     COLONOSCOPY N/A 3/20/2015    Procedure: COMBINED COLONOSCOPY, SINGLE OR MULTIPLE BIOPSY/POLYPECTOMY BY BIOPSY;  Surgeon: Chun Joe MD;  Location:  GI     COLONOSCOPY N/A 10/27/2021    Procedure: COLONOSCOPY, FLEXIBLE, WITH LESION REMOVAL USING SNARE;  Surgeon: Rocio Stahl MD;  Location:  GI     HERNIA REPAIR      inguinal hernia repair       Review of Systems  A 10 organ systems ROS is negative other than any pertinent positives or negatives previously stated.     OBJECTIVE:   /80 (BP Location: Left arm, Patient Position: Sitting, Cuff Size: Adult Regular)   Pulse 76   Ht 1.854 m (6' 1\")   Wt 117.6 kg (259 lb 4.8 oz)   SpO2 98%   BMI 34.21 kg/m      Physical Exam  GENERAL: healthy, alert and no distress  EYES: Eyes grossly normal to inspection, PERRL and conjunctivae and sclerae normal  HENT: ear canals and TM's normal, nose and mouth without ulcers or lesions  NECK: no adenopathy, no asymmetry, masses, or scars and thyroid normal to palpation  RESP: lungs clear to auscultation - no rales, rhonchi or wheezes  CV: regular rate and rhythm, normal S1 S2, no S3 or S4, no murmur, click or rub, no peripheral edema and peripheral pulses strong  ABDOMEN: soft, nontender, no hepatosplenomegaly, no masses and bowel sounds normal  RECTAL: normal sphincter tone, no rectal masses, prostate normal size, smooth, nontender without nodules or masses  MS: no gross musculoskeletal defects noted, no edema  SKIN: swell demarcated scaly plaque on gluteal cleft complains of psoriasis, scaly papule left forehead consistent with AK   NEURO: Normal strength and tone, mentation intact and speech normal  PSYCH: mentation appears normal, affect normal/bright    Labs pending     ASSESSMENT/PLAN:       ICD-10-CM    1. Routine general medical examination at " a health care facility  Z00.00 PROSTATE SPEC ANTIGEN SCREEN     PROSTATE SPEC ANTIGEN SCREEN   2. Benign essential hypertension  I10 potassium chloride ER (KLOR-CON M) 10 MEQ CR tablet   3. Hyperlipidemia LDL goal <100  E78.5 Lipid panel reflex to direct LDL Fasting     CBC with platelets     Comprehensive metabolic panel     Lipid panel reflex to direct LDL Fasting     CBC with platelets     Comprehensive metabolic panel   4. Morbid obesity (H)  E66.01    5. Personal history of tobacco use  Z87.891 Prof fee: Shared Decision Making for Lung Cancer Screening     CT Chest Lung Cancer Scrn Low Dose wo   6. Actinic keratosis  L57.0 DESTRUC BENIGN/PREMAL,1ST LESION [94738]   7. Psoriasis  L40.9 clobetasol (TEMOVATE) 0.05 % external cream     Blood pressure ok on second check reading; check labs   Recheck lipids   Counseled on diet and exercise interventions to promote weight loss   Try topical clobex for psoriasis rash ; risks and toxicities discussed       Liquid nitrogen was applied for 10-12 seconds to the lower extremity forehead actinic keratosis and the expected blistering or scabbing reaction explained. Do not pick at the area. Patient reminded to expect hypopigmented scars from the procedure. Return if lesion fails to fully resolve.    Patient has been advised of split billing requirements and indicates understanding: Yes    COUNSELING:   Reviewed preventive health counseling, as reflected in patient instructions  Special attention given to:        Regular exercise       Healthy diet/nutrition       Immunizations    He declined my recommendation for flu shot and COVID booster today              Consider Hep C screening for all patients one time for ages 18-79 years       HIV screeninx in teen years, 1x in adult years, and at intervals if high risk       Colorectal cancer screening       Prostate cancer screening       Consider lung cancer screening for ages 55-80 years (77 for Medicare) and 20 pack-year  "smoking history ; referred for exam     Estimated body mass index is 34.21 kg/m  as calculated from the following:    Height as of this encounter: 1.854 m (6' 1\").    Weight as of this encounter: 117.6 kg (259 lb 4.8 oz).     Weight management plan: Discussed healthy diet and exercise guidelines    He reports that he quit smoking about 2 years ago. His smoking use included cigarettes. He smoked 0.50 packs per day for 0.00 years. He has never used smokeless tobacco.      Counseling Resources:  ATP IV Guidelines  Pooled Cohorts Equation Calculator  FRAX Risk Assessment  ICSI Preventive Guidelines  Dietary Guidelines for Americans, 2010  USDA's MyPlate  ASA Prophylaxis  Lung CA Screening    Bryan Fragoso MD  [facility]    "

## 2022-09-22 NOTE — PROGRESS NOTES
Lung Cancer Screening Shared Decision Making Visit     Ricki Acuna, a 61 year old male, is eligible for lung cancer screening    History   Smoking Status     Former Smoker     Packs/day: 0.50     Years: 0.00     Types: Cigarettes     Quit date: 1/1/2020   Smokeless Tobacco     Never Used       I have discussed with patient the risks and benefits of screening for lung cancer with low-dose CT.     The risks include:    radiation exposure: one low dose chest CT has as much ionizing radiation as about 15 chest x-rays, or 6 months of background radiation living in [place]      false positives: most findings/nodules are NOT cancer, but some might still require additional diagnostic evaluation, including biopsy    over-diagnosis: some slow growing cancers that might never have been clinically significant will be detected and treated unnecessarily     The benefit of early detection of lung cancer is contingent upon adherence to annual screening or more frequent follow up if indicated.     Furthermore, to benefit from screening, Ricki must be willing and able to undergo diagnostic procedures, if indicated. Although no specific guide is available for determining severity of comorbidities, it is reasonable to withhold screening in patients who have greater mortality risk from other diseases.     We did discuss that the best way to prevent lung cancer is to not smoke.    Some patients may value a numeric estimation of lung cancer risk when evaluating if lung cancer screening is right for them, here is one calculator:    ShouldIScreen

## 2022-09-22 NOTE — PATIENT INSTRUCTIONS
Please call [facility] at [phone] to schedule your CT scan for lung cancer screening.         Lung Cancer Screening   Frequently Asked Questions  If you are at high-risk for lung cancer, getting screened with low-dose computed tomography (LDCT) every year can help save your life. This handout offers answers to some of the most common questions about lung cancer screening. If you have other questions, please call [phone] ([phone]).     What is it?  Lung cancer screening uses special X-ray technology to create an image of your lung tissue. The exam is quick and easy and takes less than 10 seconds. We don t give you any medicine or use any needles. You can eat before and after the exam. You don t need to change your clothes as long as the clothing on your chest doesn t contain metal. But, you do need to be able to hold your breath for at least 6 seconds during the exam.    What is the goal of lung cancer screening?  The goal of lung cancer screening is to save lives. Many times, lung cancer is not found until a person starts having physical symptoms. Lung cancer screening can help detect lung cancer in the earliest stages when it may be easier to treat.    Who should be screened for lung cancer?  We suggest lung cancer screening for anyone who is at high-risk for lung cancer. You are in the high-risk group if you:     are between the ages of 55 and 79, and   have smoked at least 1 pack of cigarettes a day for 20 or more years, and   still smoke or have quit within the past 15 years.    However, if you have a new cough or shortness of breath, you should talk to your doctor before being screened.    Why does it matter if I have symptoms?  Certain symptoms can be a sign that you have a condition in your lungs that should be checked and treated by your doctor. These symptoms include fever, chest pain, a new or changing cough, shortness of breath that you have never felt before, coughing up  blood or unexplained weight loss. Having any of these symptoms can greatly affect the results of lung cancer screening.       Should all smokers get an LDCT lung cancer screening exam?  It depends. Lung cancer screening is for a very specific group of men and women who have a history of heavy smoking over a long period of time (see  Who should be screened for lung cancer  above).  I am in the high-risk group, but have been diagnosed with cancer in the past. Is LDCT lung cancer screening right for me?  In some cases, you should not have LDCT lung screening, such as when your doctor is already following your cancer with CT scan studies. Your doctor will help you decide if LDCT lung screening is right for you.  Do I need to have a screening exam every year?  Yes. If you are in the high-risk group described earlier, you should get an LDCT lung cancer screening exam every year until you are 79, or are no longer willing or able to undergo screening and possible procedures to diagnose and treat lung cancer.  How effective is LDCT at preventing death from lung cancer?  Studies have shown that LDCT lung cancer screening can lower the risk of death from lung cancer by 20 percent in people who are at high-risk.  What are the risks?  There are some risks and limitations of LDCT lung cancer screening. We want to make sure you understand the risks and benefits, so please let us know if you have any questions. Your doctor may want to talk with you more about these risks.   Radiation exposure: As with any exam that uses radiation, there is a very small increased risk of cancer. The amount of radiation in LDCT is small--about the same amount a person would get from a mammogram. Your doctor orders the exam when he or she feels the potential benefits outweigh the risks.   False negatives: No test is perfect, including LDCT. It is possible that you may have a medical condition, including lung cancer, that is not found during your exam.  This is called a false negative result.   False positives and more testing: LDCT very often finds something in the lung that could be cancer, but in fact is not. This is called a false positive result. False positive tests often cause anxiety. To make sure these findings are not cancer, you may need to have more tests. These tests will be done only if you give us permission. Sometimes patients need a treatment that can have side effects, such as a biopsy. For more information on false positives, see  What can I expect from the results?    Findings not related to lung cancer: Your LDCT exam also takes pictures of areas of your body next to your lungs. In a very small number of cases, the CT scan will show an abnormal finding in one of these areas, such as your kidneys, adrenal glands, liver or thyroid. This finding may not be serious, but you may need more tests. Your doctor can help you decide what other tests you may need, if any.  What can I expect from the results?  About 1 out of 4 LDCT exams will find something that may need more tests. Most of the time, these findings are lung nodules. Lung nodules are very small collections of tissue in the lung. These nodules are very common, and the vast majority--more than 97 percent--are not cancer (benign). Most are normal lymph nodes or small areas of scarring from past infections.  But, if a small lung nodule is found to be cancer, the cancer can be cured more than 90 percent of the time. To know if the nodule is cancer, we may need to get more images before your next yearly screening exam. If the nodule has suspicious features (for example, it is large, has an odd shape or grows over time), we will refer you to a specialist for further testing.  Will my doctor also get the results?  Yes. Your doctor will get a copy of your results.  Is it okay to keep smoking now that there s a cancer screening exam?  No. Tobacco is one of the strongest cancer-causing agents. It  causes not only lung cancer, but other cancers and cardiovascular (heart) diseases as well. The damage caused by smoking builds over time. This means that the longer you smoke, the higher your risk of disease. While it is never too late to quit, the sooner you quit, the better.  Where can I find help to quit smoking?  The best way to prevent lung cancer is to stop smoking. If you have already quit smoking, congratulations and keep it up! For help on quitting smoking, please call [facility] at 6-262-QUITNOW ([phone]) or the American [employer] at 1-514.988.7835 to find local resources near you.  One-on-one health coaching:  If you d prefer to work individually with a health care provider on tobacco cessation, we offer:     Medication Therapy Management:  Our specially trained pharmacists work closely with you and your doctor to help you quit smoking.  Call [phone] or [phone] (toll free).

## 2022-09-23 LAB
ALBUMIN SERPL-MCNC: 3.8 G/DL (ref 3.4–5)
ALP SERPL-CCNC: 124 U/L (ref 40–150)
ALT SERPL W P-5'-P-CCNC: 57 U/L (ref 0–70)
ANION GAP SERPL CALCULATED.3IONS-SCNC: 8 MMOL/L (ref 3–14)
AST SERPL W P-5'-P-CCNC: 22 U/L (ref 0–45)
BILIRUB SERPL-MCNC: 0.7 MG/DL (ref 0.2–1.3)
BUN SERPL-MCNC: 10 MG/DL (ref 7–30)
CALCIUM SERPL-MCNC: 8.9 MG/DL (ref 8.5–10.1)
CHLORIDE BLD-SCNC: 101 MMOL/L (ref 94–109)
CHOLEST SERPL-MCNC: 180 MG/DL
CO2 SERPL-SCNC: 29 MMOL/L (ref 20–32)
CREAT SERPL-MCNC: 0.8 MG/DL (ref 0.66–1.25)
FASTING STATUS PATIENT QL REPORTED: YES
FERRITIN SERPL-MCNC: 43 NG/ML (ref 26–388)
GFR SERPL CREATININE-BSD FRML MDRD: >90 ML/MIN/1.73M2
GLUCOSE BLD-MCNC: 108 MG/DL (ref 70–99)
HDLC SERPL-MCNC: 30 MG/DL
IRON SATN MFR SERPL: 10 % (ref 15–46)
IRON SERPL-MCNC: 35 UG/DL (ref 35–180)
LDLC SERPL CALC-MCNC: 119 MG/DL
NONHDLC SERPL-MCNC: 150 MG/DL
POTASSIUM BLD-SCNC: 2.9 MMOL/L (ref 3.4–5.3)
PROT SERPL-MCNC: 7.3 G/DL (ref 6.8–8.8)
PSA SERPL-MCNC: 2.16 UG/L (ref 0–4)
SODIUM SERPL-SCNC: 138 MMOL/L (ref 133–144)
TIBC SERPL-MCNC: 355 UG/DL (ref 240–430)
TRIGL SERPL-MCNC: 153 MG/DL

## 2022-09-23 RX ORDER — AMLODIPINE AND OLMESARTAN MEDOXOMIL 5; 20 MG/1; MG/1
1 TABLET ORAL DAILY
Qty: 90 TABLET | Refills: 3 | Status: SHIPPED | OUTPATIENT
Start: 2022-09-23 | End: 2023-09-20

## 2022-09-23 NOTE — RESULT ENCOUNTER NOTE
"The following letter pertains to your most recent diagnostic tests:    -Your prostate specific antigen (PSA) test result returned normal.     -Your total cholesterol is 180 which is at your goal of total cholesterol less than 200.    -Your triglycerides are 153 which are just above your goal of triglycerides less than 150.    -Your HDL or \"good cholesterol\" is 30 which is below your goal of HDL cholesterol greater than 40.    -Your LDL cholesterol or \"bad cholesterol\" is 119 which is above your goal of LDL cholesterol less than <100.  Your LDL goal is based on your risk factors for artery disease.    -The potassium is low again.      -Liver and gallbladder tests are normal for you. (ALT,AST, Alk phos, bilirubin), kidney function is normal for you (Creatinine, GFR), Sodium is normal, Calcium is normal for you, the Glucose (blood sugar) is elevated but it is not in the diabetic range (diabetes is defined as a fasting blood sugar reading greater than 126 on two separate occassions).        -The hemoglobin is a little lower again.           Bottom line:  I will add some blood work on to the blood drawn yesterday to try to understand why your hemoglobin is mildly low again.    I think we need to change your blood pressure medication to a different medication that will not cause your potassium level to drop so low.  You will feel better with a normal potassium level and it is safer for you heart to keep the potassium in a normal range.       I recommend that we stop lisinopril- hydrochlorothiazide (Zestoretic) and change your blood pressure medication to a new medication called olmseartan-amlodipine.  You can also stop your potassium supplement once you make this change.       We will need to recheck your blood pressure and potassium level 1-2 weeks after you make this medication change.  You can schedule a lab appointment and medical assistant blood pressure check appointment for that purpose.      I will let you know " what further steps to take regarding your hemoglobin once I have additional lab results back.      Finally, your cholesterol is too high.  But, first things first, lets get your blood pressure medications straightened out and work on your anemia (low hemoglobin) and then we can tackle your cholesterol.         Sincerely,    Dr. Fragoso

## 2022-09-23 NOTE — RESULT ENCOUNTER NOTE
Can you call him and help him schedule a MA blood pressure check and lab appointment in 1-2 weeks from now as per my letter

## 2022-09-24 NOTE — RESULT ENCOUNTER NOTE
The following letter pertains to your most recent diagnostic tests:    The iron tests suggest that your low hemoglobin is from iron deficiency.    Since the colonoscopy last year did not show any obvious sources for intestinal blood loss and since the anemia remains persistent, I think you should schedule an upper gastrointestinal endoscopy procedure to look for potential blood loss sources in the stomach or first part of the intestines (upper gastrointestinal tract).      I will have the specialist that performs this procedure's  call you to find a time to get that procedure done.          Sincerely,    Dr. Fragoso

## 2022-09-26 ENCOUNTER — TELEPHONE (OUTPATIENT)
Dept: GASTROENTEROLOGY | Facility: CLINIC | Age: 62
End: 2022-09-26

## 2022-09-26 ENCOUNTER — DOCUMENTATION ONLY (OUTPATIENT)
Dept: LAB | Facility: CLINIC | Age: 62
End: 2022-09-26

## 2022-09-26 NOTE — PROGRESS NOTES
Unable to add on: Tissue transglutaminase shyanne IgA and IgG     Reason: Sample unsuitable for this test    Lab was reorder as a future lab.

## 2022-09-26 NOTE — TELEPHONE ENCOUNTER
Screening Questions  BLUE  KIND OF PREP RED  LOCATION [review exclusion criteria] GREEN  SEDATION TYPE        Y Are you active on mychart?       Bryan Fragoso MD Ordering/Referring Provider?        PO What type of coverage do you have?      N Have you had a positive covid test in the last 90 days?     1. 34.21 BMI  [BMI 40+ - review exclusion criteria]    2. Y  Are you able to give consent for your medical care? [IF NO,RN REVIEW]        3. N  Are you taking any prescription pain medications on a routine schedule?        3a.  EXTENDED PREP What kind of prescription?   4. N Do you have any chemical dependencies such as alcohol, street drugs, or methadone?    5. N Do you have any history of post-traumatic stress syndrome, severe anxiety or history of psychosis?      **If yes 3- 5 , please schedule with MAC sedation.**          IF YES TO ANY 6 - 10 - HOSPITAL SETTING ONLY.     6.   N Do you need assistance transferring?     7.   N Have you had a heart or lung transplant?    8.   N Are you currently on dialysis?   9.   N Do you use daily home oxygen?   10. N Do you take nitroglycerin?   10a. N If yes, how often?     11. [FEMALES]  N Are you currently pregnant?    11a. N If yes, how many weeks? [ Greater than 12 weeks, OR NEEDED]    12. N Do you have Pulmonary Hypertension? *NEED PAC APPT AT [facility]*     13. N [review exclusion criteria]  Do you have any implantable devices in your body (pacemaker, defib, LVAD)?    14. N In the past 6 months, have you had any heart related issues including cardiomyopathy or heart attack?     14a. N If yes, did it require cardiac stenting if so when?     15. N Have you had a stroke or Transient ischemic attack (TIA - aka  mini stroke ) within 6 months?      16. N Do you have mod to severe Obstructive Sleep Apnea?  [Hospital only - Ok at [facility]]    17. N Do you have SEVERE AND UNCONTROLLED asthma? *NEED PAC APPT AT [facility]*     18. N Are you currently taking any blood thinners?     " 18a. If yes, inform patient to \"follow up w/ ordering provider for bridging instructions.\"    19. N Do you take the medication Phentermine?    19a. If yes, \"Hold for 7 days before procedure.  Please consult your prescribing provider if you have questions about holding this medication.\"     20. N  Do you have chronic kidney disease?      21. N  Do you have a diagnosis of diabetes?     22. N  On a regular basis do you go 3-5 days between bowel movements?     23. N Preferred LOCAL Pharmacy for Pre Prescription    [ LIST ONLY ONE PHARMACY]     [id number] - [place], [place] - [address] & [facility]        - CLOSING REMINDERS -    Informed patient they will need an adult    Cannot take any type of public or medical transportation alone    Conscious Sedation- Needs  for 6 hours after the procedure       MAC/General-Needs  for 24 hours after procedure    Pre-Procedure Covid test to be completed [[facility] PCR Testing Required]    Confirmed Nurse will call to complete assessment       - SCHEDULING DETAILS -     MIKA  Surgeon    9/29/22  Date of Procedure  Upper Endoscopy [EGD]  Type of Procedure Scheduled    Location    CS Sedation Type      PAC / Pre-op Required         Additional comments:            "

## 2022-09-28 ENCOUNTER — HOSPITAL ENCOUNTER (OUTPATIENT)
Dept: CT IMAGING | Facility: CLINIC | Age: 62
Discharge: HOME | End: 2022-09-28
Attending: INTERNAL MEDICINE | Admitting: INTERNAL MEDICINE
Payer: COMMERCIAL

## 2022-09-28 DIAGNOSIS — Z87.891 PERSONAL HISTORY OF TOBACCO USE: ICD-10-CM

## 2022-09-28 PROCEDURE — 71271 CT THORAX LUNG CANCER SCR C-: CPT

## 2022-09-28 NOTE — RESULT ENCOUNTER NOTE
The following letter pertains to your most recent diagnostic tests:    Good news! The lung cancer scan is negative.  We should recheck in one year.       Sincerely,    Dr. Fragoso

## 2022-09-29 ENCOUNTER — HOSPITAL ENCOUNTER (OUTPATIENT)
Facility: CLINIC | Age: 62
Discharge: HOME | End: 2022-09-29
Attending: INTERNAL MEDICINE | Admitting: INTERNAL MEDICINE
Payer: COMMERCIAL

## 2022-09-29 VITALS
RESPIRATION RATE: 14 BRPM | HEART RATE: 67 BPM | SYSTOLIC BLOOD PRESSURE: 93 MMHG | OXYGEN SATURATION: 94 % | DIASTOLIC BLOOD PRESSURE: 79 MMHG

## 2022-09-29 LAB — UPPER GI ENDOSCOPY: NORMAL

## 2022-09-29 PROCEDURE — 43249 ESOPH EGD DILATION <30 MM: CPT | Performed by: INTERNAL MEDICINE

## 2022-09-29 PROCEDURE — 999N000099 HC STATISTIC MODERATE SEDATION < 10 MIN: Performed by: INTERNAL MEDICINE

## 2022-09-29 PROCEDURE — 250N000011 HC RX IP 250 OP 636: Performed by: INTERNAL MEDICINE

## 2022-09-29 PROCEDURE — 88305 TISSUE EXAM BY PATHOLOGIST: CPT | Mod: 26

## 2022-09-29 PROCEDURE — 43239 EGD BIOPSY SINGLE/MULTIPLE: CPT | Performed by: INTERNAL MEDICINE

## 2022-09-29 PROCEDURE — 88305 TISSUE EXAM BY PATHOLOGIST: CPT | Mod: TC | Performed by: INTERNAL MEDICINE

## 2022-09-29 RX ORDER — FENTANYL CITRATE 50 UG/ML
INJECTION, SOLUTION INTRAMUSCULAR; INTRAVENOUS PRN
Status: COMPLETED | OUTPATIENT
Start: 2022-09-29 | End: 2022-09-29

## 2022-09-29 RX ADMIN — MIDAZOLAM 2 MG: 1 INJECTION INTRAMUSCULAR; INTRAVENOUS at 10:56

## 2022-09-29 RX ADMIN — MIDAZOLAM 2 MG: 1 INJECTION INTRAMUSCULAR; INTRAVENOUS at 10:52

## 2022-09-29 RX ADMIN — FENTANYL CITRATE 100 MCG: 50 INJECTION, SOLUTION INTRAMUSCULAR; INTRAVENOUS at 10:54

## 2022-09-29 ASSESSMENT — ACTIVITIES OF DAILY LIVING (ADL): ADLS_ACUITY_SCORE: 35

## 2022-09-29 NOTE — H&P
[facility]  Pre-Endoscopy History and Physical     Ricki Acuna MRN# [id number]   YOB: 1960 Age: 61 year old     Date of Procedure: 9/29/2022  Primary care provider: Bryan Fragoso  Type of Endoscopy: esophagogastroduodenoscopy (upper GI endoscopy)  Reason for Procedure: GERD  Type of Anesthesia Anticipated: Moderate Sedation    HPI:    Ricki is a 61 year old male who will be undergoing the above procedure.      A history and physical has been performed. The patient's medications and allergies have been reviewed. The risks and benefits of the procedure and the sedation options and risks were discussed with the patient.  All questions were answered and informed consent was obtained.      He denies a personal or family history of anesthesia complications or bleeding disorders.     No Known Allergies     Prior to Admission Medications   Prescriptions Last Dose Informant Patient Reported? Taking?   amLODIPine-Olmesartan 5-20 MG TABS   No No   Sig: Take 1 Tablespoonful by mouth daily   aspirin 81 MG tablet   Yes No   Sig: Take by mouth daily   clobetasol (TEMOVATE) 0.05 % external cream   No No   Sig: Apply topically 2 times daily Apply to bottom rash sparingly for no longer than 14 days without a several day break      Facility-Administered Medications: None       Patient Active Problem List   Diagnosis     Benign essential hypertension     Adenomatous colon polyp     Hyperlipidemia LDL goal <100     Morbid obesity (H)     Primary osteoarthritis of right knee        Past Medical History:   Diagnosis Date     Adenomatous colon polyp 3-20-15    repeat in 3 years      Hypertension      Primary osteoarthritis of right knee 9/28/2021     Skin cancer     Dr. Antoine Aguirre MD DERMATOLOGY SPECIALIST         Past Surgical History:   Procedure Laterality Date     ABDOMEN SURGERY       CHOLECYSTECTOMY      open     COLONOSCOPY  3/20/15    hx polyps     COLONOSCOPY N/A 3/20/2015     "Procedure: COMBINED COLONOSCOPY, SINGLE OR MULTIPLE BIOPSY/POLYPECTOMY BY BIOPSY;  Surgeon: Chun Joe MD;  Location:  GI     COLONOSCOPY N/A 10/27/2021    Procedure: COLONOSCOPY, FLEXIBLE, WITH LESION REMOVAL USING SNARE;  Surgeon: Rocio Stahl MD;  Location:  GI     HERNIA REPAIR      inguinal hernia repair       Social History     Tobacco Use     Smoking status: Former Smoker     Packs/day: 0.50     Years: 0.00     Pack years: 0.00     Types: Cigarettes     Quit date: 2020     Years since quittin.7     Smokeless tobacco: Never Used   Substance Use Topics     Alcohol use: Yes     Alcohol/week: 7.0 standard drinks     Comment: 2 drinks a week       Family History   Problem Relation Age of Onset     Prostate Cancer Father      Cerebrovascular Disease Mother      Diabetes Mother        REVIEW OF SYSTEMS:     5 point ROS negative except as noted above in HPI, including Gen., Resp., CV, GI &  system review.      PHYSICAL EXAM:   There were no vitals taken for this visit. Estimated body mass index is 34.21 kg/m  as calculated from the following:    Height as of 22: 1.854 m (6' 1\").    Weight as of 22: 117.6 kg (259 lb 4.8 oz).   GENERAL APPEARANCE: healthy, alert and no distress  MENTAL STATUS: alert  AIRWAY EXAM: Mallampatti Class II (visualization of the soft palate, fauces, and uvula)  RESP: lungs clear to auscultation - no rales, rhonchi or wheezes  CV: regular rates and rhythm      DIAGNOSTICS:    Not indicated      IMPRESSION   ASA Class 1 - Healthy patient, no medical problems        PLAN:       Plan for esophagogastroduodenoscopy (upper GI endoscopy). We discussed the risks, benefits and alternatives and the patient wished to proceed.    The above has been forwarded to the consulting provider.      Signed Electronically by: Avel Carver MD,MD  2022      [facility]  Ph: [phone] Fax: [phone]    "

## 2022-10-04 LAB
PATH REPORT.COMMENTS IMP SPEC: NORMAL
PATH REPORT.FINAL DX SPEC: NORMAL
PATH REPORT.GROSS SPEC: NORMAL
PATH REPORT.MICROSCOPIC SPEC OTHER STN: NORMAL
PATH REPORT.RELEVANT HX SPEC: NORMAL
PHOTO IMAGE: NORMAL

## 2022-10-11 ENCOUNTER — LAB (OUTPATIENT)
Dept: LAB | Facility: CLINIC | Age: 62
End: 2022-10-11
Payer: COMMERCIAL

## 2022-10-11 ENCOUNTER — ALLIED HEALTH/NURSE VISIT (OUTPATIENT)
Dept: FAMILY MEDICINE | Facility: CLINIC | Age: 62
End: 2022-10-11
Payer: COMMERCIAL

## 2022-10-11 VITALS — OXYGEN SATURATION: 98 % | DIASTOLIC BLOOD PRESSURE: 75 MMHG | HEART RATE: 60 BPM | SYSTOLIC BLOOD PRESSURE: 123 MMHG

## 2022-10-11 DIAGNOSIS — I10 BENIGN ESSENTIAL HYPERTENSION: ICD-10-CM

## 2022-10-11 DIAGNOSIS — I10 BENIGN ESSENTIAL HYPERTENSION: Primary | ICD-10-CM

## 2022-10-11 DIAGNOSIS — D64.9 ANEMIA, UNSPECIFIED TYPE: ICD-10-CM

## 2022-10-11 LAB
BUN SERPL-MCNC: 8 MG/DL (ref 7–30)
CREAT SERPL-MCNC: 0.78 MG/DL (ref 0.66–1.25)
GFR SERPL CREATININE-BSD FRML MDRD: >90 ML/MIN/1.73M2
POTASSIUM BLD-SCNC: 3.8 MMOL/L (ref 3.4–5.3)

## 2022-10-11 PROCEDURE — 84520 ASSAY OF UREA NITROGEN: CPT

## 2022-10-11 PROCEDURE — 36415 COLL VENOUS BLD VENIPUNCTURE: CPT

## 2022-10-11 PROCEDURE — 84132 ASSAY OF SERUM POTASSIUM: CPT

## 2022-10-11 PROCEDURE — 82565 ASSAY OF CREATININE: CPT

## 2022-10-11 PROCEDURE — 86364 TISS TRNSGLTMNASE EA IG CLAS: CPT

## 2022-10-11 PROCEDURE — 99207 PR NO CHARGE NURSE ONLY: CPT

## 2022-10-11 NOTE — RESULT ENCOUNTER NOTE
The following letter pertains to your most recent diagnostic tests:    The labs are OK and suggest that you are tolerating the new blood pressure medication without any concerns.        Sincerely,    Dr. Fragoso

## 2022-10-11 NOTE — PROGRESS NOTES
BP check after starting amLODIPine-Olmesartan 5-20 MG on 9-24-22    Pt reports no side effects.     Today 123/75  p 60    Home readings placed on Dr. Fragoso's desk.    Most 120's -130's / 70's -80's

## 2022-10-12 LAB
TTG IGA SER-ACNC: 0.3 U/ML
TTG IGG SER-ACNC: <0.6 U/ML

## 2022-10-12 NOTE — RESULT ENCOUNTER NOTE
The following letter pertains to your most recent diagnostic tests:    Your tissue transglutaminase antibodies are negative which means you do not have celiac disease (sprue) which is an allergy/ intolerance to wheat gluten.   I checked this to make sure this was not the cause of your low hemoglobin readings.        Sincerely,    Dr. Fragoso

## 2023-01-15 ENCOUNTER — HEALTH MAINTENANCE LETTER (OUTPATIENT)
Age: 63
End: 2023-01-15

## 2023-09-20 ENCOUNTER — VIRTUAL VISIT (OUTPATIENT)
Dept: FAMILY MEDICINE | Facility: CLINIC | Age: 63
End: 2023-09-20
Payer: COMMERCIAL

## 2023-09-20 DIAGNOSIS — D50.9 IRON DEFICIENCY ANEMIA, UNSPECIFIED IRON DEFICIENCY ANEMIA TYPE: ICD-10-CM

## 2023-09-20 DIAGNOSIS — Z87.891 PERSONAL HISTORY OF TOBACCO USE: ICD-10-CM

## 2023-09-20 DIAGNOSIS — R60.9 EDEMA, UNSPECIFIED TYPE: Primary | ICD-10-CM

## 2023-09-20 DIAGNOSIS — N48.1 BALANITIS: ICD-10-CM

## 2023-09-20 DIAGNOSIS — I10 BENIGN ESSENTIAL HYPERTENSION: ICD-10-CM

## 2023-09-20 DIAGNOSIS — E78.5 HYPERLIPIDEMIA LDL GOAL <100: ICD-10-CM

## 2023-09-20 PROCEDURE — G0296 VISIT TO DETERM LDCT ELIG: HCPCS | Mod: VID | Performed by: INTERNAL MEDICINE

## 2023-09-20 PROCEDURE — 99214 OFFICE O/P EST MOD 30 MIN: CPT | Mod: VID | Performed by: INTERNAL MEDICINE

## 2023-09-20 RX ORDER — CLOTRIMAZOLE 1 %
CREAM (GRAM) TOPICAL 2 TIMES DAILY
Qty: 60 G | Refills: 11 | Status: SHIPPED | OUTPATIENT
Start: 2023-09-20 | End: 2024-08-05

## 2023-09-20 RX ORDER — OLMESARTAN MEDOXOMIL AND HYDROCHLOROTHIAZIDE 20/12.5 20; 12.5 MG/1; MG/1
1 TABLET ORAL DAILY
Qty: 90 TABLET | Refills: 3 | Status: SHIPPED | OUTPATIENT
Start: 2023-09-20 | End: 2024-09-09

## 2023-09-20 NOTE — PATIENT INSTRUCTIONS
Lung Cancer Screening   Frequently Asked Questions  If you are at high-risk for lung cancer, getting screened with low-dose computed tomography (LDCT) every year can help save your life. This handout offers answers to some of the most common questions about lung cancer screening. If you have other questions, please call [phone] ([phone]).     What is it?  Lung cancer screening uses special X-ray technology to create an image of your lung tissue. The exam is quick and easy and takes less than 10 seconds. We don t give you any medicine or use any needles. You can eat before and after the exam. You don t need to change your clothes as long as the clothing on your chest doesn t contain metal. But, you do need to be able to hold your breath for at least 6 seconds during the exam.    What is the goal of lung cancer screening?  The goal of lung cancer screening is to save lives. Many times, lung cancer is not found until a person starts having physical symptoms. Lung cancer screening can help detect lung cancer in the earliest stages when it may be easier to treat.    Who should be screened for lung cancer?  We suggest lung cancer screening for anyone who is at high-risk for lung cancer. You are in the high-risk group if you:      are between the ages of 55 and 79, and    have smoked at least 1 pack of cigarettes a day for 20 or more years, and    still smoke or have quit within the past 15 years.    However, if you have a new cough or shortness of breath, you should talk to your doctor before being screened.    Why does it matter if I have symptoms?  Certain symptoms can be a sign that you have a condition in your lungs that should be checked and treated by your doctor. These symptoms include fever, chest pain, a new or changing cough, shortness of breath that you have never felt before, coughing up blood or unexplained weight loss. Having any of these symptoms can greatly affect the results of lung  cancer screening.       Should all smokers get an LDCT lung cancer screening exam?  It depends. Lung cancer screening is for a very specific group of men and women who have a history of heavy smoking over a long period of time (see  Who should be screened for lung cancer  above).  I am in the high-risk group, but have been diagnosed with cancer in the past. Is LDCT lung cancer screening right for me?  In some cases, you should not have LDCT lung screening, such as when your doctor is already following your cancer with CT scan studies. Your doctor will help you decide if LDCT lung screening is right for you.  Do I need to have a screening exam every year?  Yes. If you are in the high-risk group described earlier, you should get an LDCT lung cancer screening exam every year until you are 79, or are no longer willing or able to undergo screening and possible procedures to diagnose and treat lung cancer.  How effective is LDCT at preventing death from lung cancer?  Studies have shown that LDCT lung cancer screening can lower the risk of death from lung cancer by 20 percent in people who are at high-risk.  What are the risks?  There are some risks and limitations of LDCT lung cancer screening. We want to make sure you understand the risks and benefits, so please let us know if you have any questions. Your doctor may want to talk with you more about these risks.    Radiation exposure: As with any exam that uses radiation, there is a very small increased risk of cancer. The amount of radiation in LDCT is small--about the same amount a person would get from a mammogram. Your doctor orders the exam when he or she feels the potential benefits outweigh the risks.    False negatives: No test is perfect, including LDCT. It is possible that you may have a medical condition, including lung cancer, that is not found during your exam. This is called a false negative result.    False positives and more testing: LDCT very often finds  something in the lung that could be cancer, but in fact is not. This is called a false positive result. False positive tests often cause anxiety. To make sure these findings are not cancer, you may need to have more tests. These tests will be done only if you give us permission. Sometimes patients need a treatment that can have side effects, such as a biopsy. For more information on false positives, see  What can I expect from the results?     Findings not related to lung cancer: Your LDCT exam also takes pictures of areas of your body next to your lungs. In a very small number of cases, the CT scan will show an abnormal finding in one of these areas, such as your kidneys, adrenal glands, liver or thyroid. This finding may not be serious, but you may need more tests. Your doctor can help you decide what other tests you may need, if any.  What can I expect from the results?  About 1 out of 4 LDCT exams will find something that may need more tests. Most of the time, these findings are lung nodules. Lung nodules are very small collections of tissue in the lung. These nodules are very common, and the vast majority--more than 97 percent--are not cancer (benign). Most are normal lymph nodes or small areas of scarring from past infections.  But, if a small lung nodule is found to be cancer, the cancer can be cured more than 90 percent of the time. To know if the nodule is cancer, we may need to get more images before your next yearly screening exam. If the nodule has suspicious features (for example, it is large, has an odd shape or grows over time), we will refer you to a specialist for further testing.  Will my doctor also get the results?  Yes. Your doctor will get a copy of your results.  Is it okay to keep smoking now that there s a cancer screening exam?  No. Tobacco is one of the strongest cancer-causing agents. It causes not only lung cancer, but other cancers and cardiovascular (heart) diseases as well. The damage  caused by smoking builds over time. This means that the longer you smoke, the higher your risk of disease. While it is never too late to quit, the sooner you quit, the better.  Where can I find help to quit smoking?  The best way to prevent lung cancer is to stop smoking. If you have already quit smoking, congratulations and keep it up! For help on quitting smoking, please call [employer] at 9-949-QUITNOW ([phone]) or the [employer] at 1-598.875.1010 to find local resources near you.  One-on-one health coaching:  If you d prefer to work individually with a health care provider on tobacco cessation, we offer:      Medication Therapy Management:  Our specially trained pharmacists work closely with you and your doctor to help you quit smoking.  Call [phone] or [phone] (toll free).

## 2023-09-20 NOTE — PROGRESS NOTES
Ricki is a 62 year old who is being evaluated via a billable video visit.      How would you like to obtain your AVS? MyChart  If the video visit is dropped, the invitation should be resent by: Text to cell phone: [phone]  Will anyone else be joining your video visit? No      Visit Diagnosis, Associated Orders, and Comments     ICD-10-CM    1. Edema, unspecified type  R60.9 US SEGUNDO Doppler with Exercise Bilateral     History sounds most consistent with venous insufficiency given that the swelling worsens as the day progresses and resolves overnight.  Amlodipine is a risk factor for leg swelling.  We decided to stop amlodipine and use hydrochlorothiazide in the combination tablet instead of amlodipine.  Given the asymmetric nature of the swelling, I recommended a Doppler ultrasound to exclude DVT.  He is already using compression hosiery.      2. Balanitis  N48.1 clotrimazole (LOTRIMIN) 1 % external cream     By video exam, it is difficult to diagnose this problem, but I suspect that he probably has balanitis and could try using clotrimazole.  I told him that it may take several weeks of persistent use before the skin problem resolves.  I also told him that if there is any pain or difficulty retracting the foreskin that he should have a face-to-face evaluation.  I think he understood these instructions.      3. Benign essential hypertension  I10 olmesartan-hydrochlorothiazide (BENICAR HCT) 20-12.5 MG tablet     Comprehensive metabolic panel     CBC with platelets     Again, we are going to stop the amlodipine component of the amlodipine olmesartan combination tablet and replace it with hydrochlorothiazide 12.5 mg as above.      4. Hyperlipidemia LDL goal <100  E78.5 Lipid panel reflex to direct LDL Fasting     Recheck lipids      5. Personal history of tobacco use  Z87.891 Prof fee: Shared Decision Making for Lung Cancer Screening     CT Chest Lung Cancer Scrn Low Dose wo     Referred for screening CT        Plan  to schedule lab appointment with medical assistant blood pressure check in 1 to 3 weeks to follow-up on the amlodipine to hydrochlorothiazide change.  Also, the patient is due for a preventive exam, hopefully we can get that scheduled in the next 3 months.          Sin Robison is a 62 year old, presenting for the following health issues:  Hypertension (Patient is having a video virtual appointment to follow up with BP medication and bilateral leg edema.)      History of Present Illness       Reason for visit:  BP and swelling in both lower legs . And if labs can be done    He eats 2-3 servings of fruits and vegetables daily.He consumes 0 sweetened beverage(s) daily.He exercises with enough effort to increase his heart rate 60 or more minutes per day.  He exercises with enough effort to increase his heart rate 3 or less days per week.   He is taking medications regularly.           62-year-old man with hypertension, hyperlipidemia, history of cigarette smoking  He changed jobs and his new job requires him to stand for 8 or 9 hours straight  He has noted swelling in his bilateral legs right greater than left at the end of his work shifts  In the morning, he has no swelling  He finds that at night he has to get up to urinate more frequently and wonders if the swelling in his legs is returning to his circulation and making him feel like he needs to urinate at night?  Also, he has noticed a rash under his foreskin and mildly itchy skin associated with it without pain  Finally, he is overdue for screening lab test and wonders if they can be ordered?  In addition, he is overdue for lung cancer screening CT and wonders if that could be ordered?      Review of Systems         Objective           Vitals:  No vitals were obtained today due to virtual visit.    Physical Exam   GENERAL: Healthy, alert and no distress  EYES: Eyes grossly normal to inspection.  No discharge or erythema, or obvious scleral/conjunctival  abnormalities.  RESP: No audible wheeze, cough, or visible cyanosis.  No visible retractions or increased work of breathing.    SKIN: Visible skin clear. No significant rash, abnormal pigmentation or lesions.  NEURO: Cranial nerves grossly intact.  Mentation and speech appropriate for age.  PSYCH: Mentation appears normal, affect normal/bright, judgement and insight intact, normal speech and appearance well-groomed.                Video-Visit Details    Type of service:  Video Visit     Originating Location (pt. Location): Home    Distant Location (provider location):  On-site  Platform used for Video Visit: [facility]      Lung Cancer Screening Shared Decision Making Visit     Ricki Acuna, a 62 year old male, is eligible for lung cancer screening    History   Smoking Status    Former    Packs/day: 0.50    Years: 0.00    Types: Cigarettes    Quit date: 1/1/2020   Smokeless Tobacco    Never       I have discussed with patient the risks and benefits of screening for lung cancer with low-dose CT.     The risks include:    radiation exposure: one low dose chest CT has as much ionizing radiation as about 15 chest x-rays, or 6 months of background radiation living in [place]      false positives: most findings/nodules are NOT cancer, but some might still require additional diagnostic evaluation, including biopsy    over-diagnosis: some slow growing cancers that might never have been clinically significant will be detected and treated unnecessarily     The benefit of early detection of lung cancer is contingent upon adherence to annual screening or more frequent follow up if indicated.     Furthermore, to benefit from screening, Ricki must be willing and able to undergo diagnostic procedures, if indicated. Although no specific guide is available for determining severity of comorbidities, it is reasonable to withhold screening in patients who have greater mortality risk from other diseases.     We did discuss that the  best way to prevent lung cancer is to not smoke.    Some patients may value a numeric estimation of lung cancer risk when evaluating if lung cancer screening is right for them, here is one calculator:    ShouldIScreen

## 2023-10-04 ENCOUNTER — HOSPITAL ENCOUNTER (OUTPATIENT)
Dept: ULTRASOUND IMAGING | Facility: CLINIC | Age: 63
Discharge: HOME | End: 2023-10-04
Attending: INTERNAL MEDICINE
Payer: COMMERCIAL

## 2023-10-04 ENCOUNTER — HOSPITAL ENCOUNTER (OUTPATIENT)
Dept: CT IMAGING | Facility: CLINIC | Age: 63
Discharge: HOME | End: 2023-10-04
Attending: INTERNAL MEDICINE
Payer: COMMERCIAL

## 2023-10-04 DIAGNOSIS — Z87.891 PERSONAL HISTORY OF TOBACCO USE: ICD-10-CM

## 2023-10-04 DIAGNOSIS — R60.9 EDEMA, UNSPECIFIED TYPE: ICD-10-CM

## 2023-10-04 PROCEDURE — 71271 CT THORAX LUNG CANCER SCR C-: CPT

## 2023-10-04 PROCEDURE — 93970 EXTREMITY STUDY: CPT

## 2023-10-04 NOTE — RESULT ENCOUNTER NOTE
The following letter pertains to your most recent diagnostic tests:    Good news! The ultrasound is negative for dangerous blood clots in the legs.        Sincerely,    Dr. Fragoso

## 2023-10-04 NOTE — RESULT ENCOUNTER NOTE
The following letter pertains to your most recent diagnostic tests:    Good news! The lung cancer screening CT scan is negative.  We should recheck in one year.        Sincerely,    Dr. Fragoso

## 2023-10-05 ENCOUNTER — LAB (OUTPATIENT)
Dept: LAB | Facility: CLINIC | Age: 63
End: 2023-10-05
Payer: COMMERCIAL

## 2023-10-05 ENCOUNTER — ALLIED HEALTH/NURSE VISIT (OUTPATIENT)
Dept: FAMILY MEDICINE | Facility: CLINIC | Age: 63
End: 2023-10-05
Payer: COMMERCIAL

## 2023-10-05 VITALS — SYSTOLIC BLOOD PRESSURE: 126 MMHG | DIASTOLIC BLOOD PRESSURE: 74 MMHG | HEART RATE: 76 BPM

## 2023-10-05 DIAGNOSIS — I10 BENIGN ESSENTIAL HYPERTENSION: Primary | ICD-10-CM

## 2023-10-05 DIAGNOSIS — D50.9 IRON DEFICIENCY ANEMIA, UNSPECIFIED IRON DEFICIENCY ANEMIA TYPE: ICD-10-CM

## 2023-10-05 DIAGNOSIS — E78.5 HYPERLIPIDEMIA LDL GOAL <100: ICD-10-CM

## 2023-10-05 DIAGNOSIS — I10 BENIGN ESSENTIAL HYPERTENSION: ICD-10-CM

## 2023-10-05 DIAGNOSIS — R73.01 IFG (IMPAIRED FASTING GLUCOSE): Primary | ICD-10-CM

## 2023-10-05 LAB
ALBUMIN SERPL BCG-MCNC: 4.2 G/DL (ref 3.5–5.2)
ALP SERPL-CCNC: 137 U/L (ref 40–129)
ALT SERPL W P-5'-P-CCNC: 28 U/L (ref 0–70)
ANION GAP SERPL CALCULATED.3IONS-SCNC: 9 MMOL/L (ref 7–15)
AST SERPL W P-5'-P-CCNC: 21 U/L (ref 0–45)
BILIRUB SERPL-MCNC: 0.4 MG/DL
BUN SERPL-MCNC: 13.3 MG/DL (ref 8–23)
CALCIUM SERPL-MCNC: 9 MG/DL (ref 8.8–10.2)
CHLORIDE SERPL-SCNC: 104 MMOL/L (ref 98–107)
CHOLEST SERPL-MCNC: 144 MG/DL
CREAT SERPL-MCNC: 0.91 MG/DL (ref 0.67–1.17)
DEPRECATED HCO3 PLAS-SCNC: 26 MMOL/L (ref 22–29)
EGFRCR SERPLBLD CKD-EPI 2021: >90 ML/MIN/1.73M2
ERYTHROCYTE [DISTWIDTH] IN BLOOD BY AUTOMATED COUNT: 13.2 % (ref 10–15)
FERRITIN SERPL-MCNC: 14 NG/ML (ref 31–409)
GLUCOSE SERPL-MCNC: 127 MG/DL (ref 70–99)
HBA1C MFR BLD: 6.3 % (ref 0–5.6)
HCT VFR BLD AUTO: 37.1 % (ref 40–53)
HDLC SERPL-MCNC: 31 MG/DL
HGB BLD-MCNC: 11.2 G/DL (ref 13.3–17.7)
IRON BINDING CAPACITY (ROCHE): 360 UG/DL (ref 240–430)
IRON SATN MFR SERPL: 13 % (ref 15–46)
IRON SERPL-MCNC: 45 UG/DL (ref 61–157)
LDLC SERPL CALC-MCNC: 87 MG/DL
MCH RBC QN AUTO: 25.7 PG (ref 26.5–33)
MCHC RBC AUTO-ENTMCNC: 30.2 G/DL (ref 31.5–36.5)
MCV RBC AUTO: 85 FL (ref 78–100)
NONHDLC SERPL-MCNC: 113 MG/DL
PLATELET # BLD AUTO: 342 10E3/UL (ref 150–450)
POTASSIUM SERPL-SCNC: 3.7 MMOL/L (ref 3.4–5.3)
PROT SERPL-MCNC: 6.9 G/DL (ref 6.4–8.3)
RBC # BLD AUTO: 4.35 10E6/UL (ref 4.4–5.9)
SODIUM SERPL-SCNC: 139 MMOL/L (ref 135–145)
TRIGL SERPL-MCNC: 128 MG/DL
WBC # BLD AUTO: 6.3 10E3/UL (ref 4–11)

## 2023-10-05 PROCEDURE — 83036 HEMOGLOBIN GLYCOSYLATED A1C: CPT

## 2023-10-05 PROCEDURE — 83550 IRON BINDING TEST: CPT

## 2023-10-05 PROCEDURE — 99207 PR NO CHARGE NURSE ONLY: CPT

## 2023-10-05 PROCEDURE — 83540 ASSAY OF IRON: CPT

## 2023-10-05 PROCEDURE — 82728 ASSAY OF FERRITIN: CPT

## 2023-10-05 PROCEDURE — 80061 LIPID PANEL: CPT

## 2023-10-05 PROCEDURE — 85027 COMPLETE CBC AUTOMATED: CPT

## 2023-10-05 PROCEDURE — 36415 COLL VENOUS BLD VENIPUNCTURE: CPT

## 2023-10-05 PROCEDURE — 80053 COMPREHEN METABOLIC PANEL: CPT

## 2023-10-05 NOTE — RESULT ENCOUNTER NOTE
"The following letter pertains to your most recent diagnostic tests:    -Your hemoglobin A1c test which is a diabetes blood test that represents and average of your blood sugars over the last 3 months returned at 6.3 which is in the prediabetic range.    -Your cholesterol panel looks healthy with the exception of mildly low \"good cholesterol\" (HDL).      -Liver and gallbladder tests are normal for you. (ALT,AST, Alk phos, bilirubin), kidney function is normal for you (Creatinine, GFR), Sodium is normal, Potassium is normal for you, Calcium is normal for you, Glucose (blood sugar) is high.     -The hemoglobin returned slightly lower than last check a year.  I have added iron tests onto the blood that was drawn to see if the lower hemoglobin is related to lack of iron.        Bottom line:      There are 2 major problems with your blood test results:    1.  You have prediabetes    Prediabetes is a condition in which blood sugar levels are higher than normal but not high enough to be diagnosed as diabetes. If left untreated, prediabetes can progress to type 2 diabetes, a serious condition that can lead to many health problems.      The most effective way to prevent prediabetes from progressing to diabetes is to make healthy lifestyle changes. This includes eating a healthy diet that is low in sugar and starches (carbohydrates).  It is particularly important to avoid foods that contain added sugars such as high fructose corn syrup.  Soda pop, juices, sport drinks, candies and sweets commonly contain added sugars.     In addition to changing your diet, increasing physical activity can prevent progression to diabetes. Regular exercise can help improve insulin sensitivity and reduce blood sugar levels. Aim for at 150 minutes of moderate-intensity physical activity each week.  This could include brisk walking, cycling, or swimming.  This increase of physical activity will also help your \"good cholesterol\" HDL " improved.    Losing weight is an effective way to prevent prediabetes from progressing to diabetes. Even a modest weight loss of 5-10% of you current body weight can make a significant difference in reducing your risk of developing type 2 diabetes.    A combination of healthy eating and regular exercise can help you lose weight and reduce your risk of developing type 2 diabetes.       2.  Your anemia (low hemoglobin) is slightly worse.  If the pending iron studies suggest that the anemia is from lack of iron, I recommend that we proceed with a colonoscopy a year earlier than previously planned to make sure there is nothing in the colon that might explain the iron deficiency anemia such as a colon polyp or early colon cancer.  I think we should also repeat the upper endoscopy at the time to make sure there is no upper gastrointestinal source for blood loss that might explain the anemia.    I will let you know the results of the iron studies when they are available        Sincerely,    Dr. Fragoso

## 2023-10-05 NOTE — RESULT ENCOUNTER NOTE
The following letter pertains to your most recent diagnostic tests:    The iron studies reveal iron deficiency as the cause for your worsening low hemoglobin.    It is important that we schedule a colonoscopy and an upper endoscopy to understand why your iron levels continue to be low.  I will have the colonoscopy and upper endoscopy schedulers call you to find a time to schedule those tests.    Sincerely,    Dr. Fragoso

## 2023-10-05 NOTE — PROGRESS NOTES
Ricki Acuna is a 62 year old patient who comes in today for a Blood Pressure check.  Initial BP:  /74 (BP Location: Left arm, Patient Position: Sitting, Cuff Size: Adult Large)   Pulse 76      76    **Pt has been checking BP at home and readings have been in 120s as well.     Disposition: follow-up as previously indicated by provider.  Thais Fuchs, CMA

## 2023-10-06 ENCOUNTER — TELEPHONE (OUTPATIENT)
Dept: GASTROENTEROLOGY | Facility: CLINIC | Age: 63
End: 2023-10-06
Payer: COMMERCIAL

## 2023-10-06 NOTE — TELEPHONE ENCOUNTER
"Endoscopy Scheduling Screen    Have you had a positive Covid test in the last 14 days?  No    Are you active on MyChart?   Yes    What insurance is in the chart?  Other:  [facility]    Ordering/Referring Provider: KARINA JIMENEZ    (If ordering provider performs procedure, schedule with ordering provider unless otherwise instructed. )    BMI: Estimated body mass index is 34.21 kg/m  as calculated from the following:    Height as of 9/22/22: 1.854 m (6' 1\").    Weight as of 9/22/22: 117.6 kg (259 lb 4.8 oz).     Sedation Ordered  moderate sedation.   If patient BMI > 50 do not schedule in [facility].    If patient BMI > 45 do not schedule at [facility].    Are you taking methadone or Suboxone?  No    Are you taking any prescription medications for pain 3 or more times per week?   No    Do you have a history of malignant hyperthermia or adverse reaction to anesthesia?  No     Have you been diagnosed or told you have pulmonary hypertension?   No    Do you have an LVAD?  No    Have you been told you have moderate to severe sleep apnea?  No    Have you been told you have COPD, asthma, or any other lung disease?  No    Do you have any heart conditions?  No     Have you ever had an organ transplant?   No    Have you ever had or are you awaiting a heart or lung transplant?   No    Have you had a stroke or transient ischemic attack (TIA aka \"mini stroke\" in the last 6 months?   No    Have you been diagnosed with or been told you have cirrhosis of the liver?   No    Are you currently on dialysis?   No    Do you need assistance transferring?   No    BMI: Estimated body mass index is 34.21 kg/m  as calculated from the following:    Height as of 9/22/22: 1.854 m (6' 1\").    Weight as of 9/22/22: 117.6 kg (259 lb 4.8 oz).     Is patients BMI > 40 and scheduling location [facility]?  No    Do you take an injectable medication for weight loss or diabetes (excluding insulin)?  No    Do you take the medication Naltrexone?  No    Do you take blood " thinners?  Yes     Are you taking Effient/Prasugrel?  No, you must contact your prescribing provider for direction on holding or bridging with a different medication.       Prep   Are you currently on dialysis or do you have chronic kidney disease?  No    Do you have a diagnosis of diabetes?  No    Do you have a diagnosis of cystic fibrosis (CF)?  No    On a regular basis do you go 3 -5 days between bowel movements?  No    BMI > 40?  No    Preferred Pharmacy:    [id number]  [place], [place] - [address] [place] [place] [address]  Phone: [phone] Fax: [phone]      Final Scheduling Details   Colonoscopy prep sent?  Standard MiraLAX    Procedure scheduled  Colonoscopy / Upper endoscopy (EGD)    Surgeon:  TEO     Date of procedure:  11/24/2023     Pre-OP / PAC:   No - Not required for this site.    Location  [facility] - Per order.    Sedation   Moderate Sedation - Per order.      Patient Reminders:   You will receive a call from a Nurse to review instructions and health history.  This assessment must be completed prior to your procedure.  Failure to complete the Nurse assessment may result in the procedure being cancelled.      On the day of your procedure, please designate an adult(s) who can drive you home stay with you for the next 24 hours. The medicines used in the exam will make you sleepy. You will not be able to drive.      You cannot take public transportation, ride share services, or non-medical taxi service without a responsible caregiver.  Medical transport services are allowed with the requirement that a responsible caregiver will receive you at your destination.  We require that drivers and caregivers are confirmed prior to your procedure.

## 2023-10-11 ASSESSMENT — ENCOUNTER SYMPTOMS
SORE THROAT: 0
DYSURIA: 0
ABDOMINAL PAIN: 0
HEMATOCHEZIA: 1
HEMATURIA: 0
WEAKNESS: 0
CONSTIPATION: 0
DIARRHEA: 0
HEARTBURN: 0
COUGH: 0
FEVER: 0
SHORTNESS OF BREATH: 0
CHILLS: 0
ARTHRALGIAS: 0
EYE PAIN: 0
FREQUENCY: 0
PALPITATIONS: 0
MYALGIAS: 0
PARESTHESIAS: 0
DIZZINESS: 0
NAUSEA: 0
NERVOUS/ANXIOUS: 0
JOINT SWELLING: 0
HEADACHES: 0

## 2023-10-18 ENCOUNTER — OFFICE VISIT (OUTPATIENT)
Dept: FAMILY MEDICINE | Facility: CLINIC | Age: 63
End: 2023-10-18
Payer: COMMERCIAL

## 2023-10-18 VITALS
RESPIRATION RATE: 13 BRPM | TEMPERATURE: 97.5 F | HEIGHT: 73 IN | DIASTOLIC BLOOD PRESSURE: 78 MMHG | WEIGHT: 260.7 LBS | SYSTOLIC BLOOD PRESSURE: 136 MMHG | BODY MASS INDEX: 34.55 KG/M2 | OXYGEN SATURATION: 97 % | HEART RATE: 75 BPM

## 2023-10-18 DIAGNOSIS — Z12.5 SCREENING FOR PROSTATE CANCER: ICD-10-CM

## 2023-10-18 DIAGNOSIS — E78.5 HYPERLIPIDEMIA LDL GOAL <100: ICD-10-CM

## 2023-10-18 DIAGNOSIS — D50.9 IRON DEFICIENCY ANEMIA, UNSPECIFIED IRON DEFICIENCY ANEMIA TYPE: ICD-10-CM

## 2023-10-18 DIAGNOSIS — Z00.00 ROUTINE GENERAL MEDICAL EXAMINATION AT A HEALTH CARE FACILITY: Primary | ICD-10-CM

## 2023-10-18 DIAGNOSIS — I10 BENIGN ESSENTIAL HYPERTENSION: ICD-10-CM

## 2023-10-18 DIAGNOSIS — R73.03 PREDIABETES: ICD-10-CM

## 2023-10-18 PROCEDURE — G0103 PSA SCREENING: HCPCS | Performed by: INTERNAL MEDICINE

## 2023-10-18 PROCEDURE — 36415 COLL VENOUS BLD VENIPUNCTURE: CPT | Performed by: INTERNAL MEDICINE

## 2023-10-18 PROCEDURE — 99396 PREV VISIT EST AGE 40-64: CPT | Performed by: INTERNAL MEDICINE

## 2023-10-18 ASSESSMENT — ENCOUNTER SYMPTOMS
HEADACHES: 0
HEARTBURN: 0
SORE THROAT: 0
HEMATURIA: 0
DIARRHEA: 0
PALPITATIONS: 0
ABDOMINAL PAIN: 0
SHORTNESS OF BREATH: 0
EYE PAIN: 0
FREQUENCY: 0
PARESTHESIAS: 0
FEVER: 0
COUGH: 0
JOINT SWELLING: 0
NERVOUS/ANXIOUS: 0
DIZZINESS: 0
ARTHRALGIAS: 0
MYALGIAS: 0
HEMATOCHEZIA: 1
WEAKNESS: 0
CONSTIPATION: 0
NAUSEA: 0
CHILLS: 0
DYSURIA: 0

## 2023-10-18 ASSESSMENT — PAIN SCALES - GENERAL: PAINLEVEL: NO PAIN (0)

## 2023-10-18 NOTE — PROGRESS NOTES
SUBJECTIVE:   CC: Ricki is an 62 year old who presents for preventative health visit.       Healthy Habits:     Getting at least 3 servings of Calcium per day:  Yes    Bi-annual eye exam:  NO    Dental care twice a year:  Yes    Sleep apnea or symptoms of sleep apnea:  None    Diet:  Regular (no restrictions)    Frequency of exercise:  None    Taking medications regularly:  Yes    Medication side effects:  None    Additional concerns today:  No        His penis rash has resolved.  We discussed that he is having hematochezia.  No melena.  He also has intermittent diarrhea but no blood in the diarrhea.  He notes blood in his toilet bowl and on his toilet tissue.  Again, he was referred for upper and lower endoscopy for iron deficiency anemia prior to this visit.  His home blood pressure readings have been under good control since switching from amlodipine to hydrochlorothiazide and he has had no side effects from that medication.  His leg swelling has resolved all but the slightest bit of swelling at the end of the long standing work day that resolves overnight.      Social History     Tobacco Use    Smoking status: Former     Packs/day: 0.50     Years: 0.00     Additional pack years: 0.00     Total pack years: 0.00     Types: Cigarettes     Quit date: 1/1/2020     Years since quitting: 3.7    Smokeless tobacco: Never   Substance Use Topics    Alcohol use: Yes     Alcohol/week: 7.0 standard drinks of alcohol     Comment: 2 drinks a week             10/11/2023     9:38 AM   Alcohol Use   Prescreen: >3 drinks/day or >7 drinks/week? No       Last PSA:   PSA   Date Value Ref Range Status   08/31/2020 1.51 0 - 4 ug/L Final     Comment:     Assay Method:  Chemiluminescence using Siemens Vista analyzer     Prostate Specific Antigen Screen   Date Value Ref Range Status   09/22/2022 2.16 0.00 - 4.00 ug/L Final       Reviewed orders with patient. Reviewed health maintenance and updated orders accordingly - Yes  Patient Active  Problem List   Diagnosis    Benign essential hypertension    Adenomatous colon polyp    Hyperlipidemia LDL goal <100    Morbid obesity (H)    Primary osteoarthritis of right knee     Past Surgical History:   Procedure Laterality Date    ABDOMEN SURGERY      CHOLECYSTECTOMY      open    COLONOSCOPY  3/20/15    hx polyps    COLONOSCOPY N/A 3/20/2015    Procedure: COMBINED COLONOSCOPY, SINGLE OR MULTIPLE BIOPSY/POLYPECTOMY BY BIOPSY;  Surgeon: Chun Joe MD;  Location:  GI    COLONOSCOPY N/A 10/27/2021    Procedure: COLONOSCOPY, FLEXIBLE, WITH LESION REMOVAL USING SNARE;  Surgeon: Rocio Stahl MD;  Location:  GI    ESOPHAGOSCOPY, GASTROSCOPY, DUODENOSCOPY (EGD), COMBINED N/A 9/29/2022    Procedure: ESOPHAGOGASTRODUODENOSCOPY, WITH BIOPSY;  Surgeon: Avel Carver MD;  Location:  GI    HERNIA REPAIR      inguinal hernia repair       Social History     Tobacco Use    Smoking status: Former     Packs/day: 0.50     Years: 0.00     Additional pack years: 0.00     Total pack years: 0.00     Types: Cigarettes     Quit date: 1/1/2020     Years since quitting: 3.7    Smokeless tobacco: Never   Substance Use Topics    Alcohol use: Yes     Alcohol/week: 7.0 standard drinks of alcohol     Comment: 2 drinks a week     Family History   Problem Relation Age of Onset    Prostate Cancer Father     Cerebrovascular Disease Mother     Diabetes Mother          Current Outpatient Medications   Medication Sig Dispense Refill    olmesartan-hydrochlorothiazide (BENICAR HCT) 20-12.5 MG tablet Take 1 tablet by mouth daily 90 tablet 3    clobetasol (TEMOVATE) 0.05 % external cream Apply topically 2 times daily Apply to bottom rash sparingly for no longer than 14 days without a several day break (Patient not taking: Reported on 10/18/2023) 60 g 11    clotrimazole (LOTRIMIN) 1 % external cream Apply topically 2 times daily (Patient not taking: Reported on 10/18/2023) 60 g 11     No Known Allergies    Reviewed and  updated as needed this visit by clinical staff   Tobacco  Allergies  Meds              Reviewed and updated as needed this visit by Provider                 Past Medical History:   Diagnosis Date    Adenomatous colon polyp 3-20-15    repeat in 3 years     Hypertension     Primary osteoarthritis of right knee 9/28/2021    Skin cancer     Dr. Antoine Aguirre MD DERMATOLOGY SPECIALIST       Past Surgical History:   Procedure Laterality Date    ABDOMEN SURGERY      CHOLECYSTECTOMY      open    COLONOSCOPY  3/20/15    hx polyps    COLONOSCOPY N/A 3/20/2015    Procedure: COMBINED COLONOSCOPY, SINGLE OR MULTIPLE BIOPSY/POLYPECTOMY BY BIOPSY;  Surgeon: Chun Joe MD;  Location:  GI    COLONOSCOPY N/A 10/27/2021    Procedure: COLONOSCOPY, FLEXIBLE, WITH LESION REMOVAL USING SNARE;  Surgeon: Rocio Stahl MD;  Location: [facility]    ESOPHAGOSCOPY, GASTROSCOPY, DUODENOSCOPY (EGD), COMBINED N/A 9/29/2022    Procedure: ESOPHAGOGASTRODUODENOSCOPY, WITH BIOPSY;  Surgeon: Avel Carver MD;  Location:  GI    HERNIA REPAIR      inguinal hernia repair       Review of Systems   Constitutional:  Negative for chills and fever.   HENT:  Negative for congestion, ear pain, hearing loss and sore throat.    Eyes:  Negative for pain and visual disturbance.   Respiratory:  Negative for cough and shortness of breath.    Cardiovascular:  Negative for chest pain, palpitations and peripheral edema.   Gastrointestinal:  Positive for hematochezia. Negative for abdominal pain, constipation, diarrhea, heartburn and nausea.   Genitourinary:  Negative for dysuria, frequency, genital sores, hematuria, impotence, penile discharge and urgency.   Musculoskeletal:  Negative for arthralgias, joint swelling and myalgias.   Skin:  Negative for rash.   Neurological:  Negative for dizziness, weakness, headaches and paresthesias.   Psychiatric/Behavioral:  Negative for mood changes. The patient is not nervous/anxious.          OBJECTIVE:  "  /78 (BP Location: Right arm, Patient Position: Sitting, Cuff Size: Adult Regular)   Pulse 75   Temp 97.5  F (36.4  C) (Oral)   Resp 13   Ht 1.854 m (6' 1\")   Wt 118.3 kg (260 lb 11.2 oz)   SpO2 97%   BMI 34.40 kg/m      Physical Exam  GENERAL: healthy, alert and no distress  EYES: Eyes grossly normal to inspection, PERRL and conjunctivae and sclerae normal  HENT: ear canals and TM's normal, nose and mouth without ulcers or lesions  NECK: no adenopathy, no asymmetry, masses, or scars and thyroid normal to palpation  RESP: lungs clear to auscultation - no rales, rhonchi or wheezes  CV: regular rate and rhythm, normal S1 S2, no S3 or S4, no murmur, click or rub, no peripheral edema and peripheral pulses strong  ABDOMEN: soft, nontender, no hepatosplenomegaly, no masses and bowel sounds normal   (male): normal male genitalia without lesions or urethral discharge, no hernia  RECTAL: He declined a rectal examination today  MS: no gross musculoskeletal defects noted, no edema  SKIN: no suspicious lesions or rashes  NEURO: Normal strength and tone, mentation intact and speech normal  PSYCH: mentation appears normal, affect normal/bright        ASSESSMENT/PLAN:       ICD-10-CM    1. Routine general medical examination at a health care facility  Z00.00       2. Benign essential hypertension  I10       3. Hyperlipidemia LDL goal <100  E78.5       4. Prediabetes  R73.03       5. Iron deficiency anemia, unspecified iron deficiency anemia type  D50.9       6. Screening for prostate cancer  Z12.5 PSA, screen     CANCELED: PSA, screen          Blood pressure okay on hydrochlorothiazide  Recent lipid panel was healthy  Discussed diet interventions and exercise interventions to improve prediabetes  Again, referred for EGD colonoscopy to evaluate recurrent iron deficiency anemia, celiac sprue testing has been negative, check capsule endoscopy if upper and lower endoscopy test do not show source of iron deficiency  A " PSA was not included on his recent labs so I called him and asked him to return to have that lab drawn today  He would like to have his A1c checked in the 6-month interval      COUNSELING:   Reviewed preventive health counseling, as reflected in patient instructions  Special attention given to:        Regular exercise       Healthy diet/nutrition       Immunizations  Vaccinated for: He declined a COVID-vaccine today, he does not plan to get more COVID vaccines, he declined a flu shot today.  I reminded him to get shingles vaccine.         Colorectal cancer screening; referred for exam for evaluation of new iron deficiency anemia       Prostate cancer screening; I called to return to have PSA drawn       Consider lung cancer screening for ages 55-80 years (77 for Medicare) and 20 pack-year smoking history ; he had a recent scan        He reports that he quit smoking about 3 years ago. His smoking use included cigarettes. He smoked an average of 0.50 packs per day. He has never used smokeless tobacco.          Bryan Fragoso MD  [facility]

## 2023-10-19 LAB — PSA SERPL DL<=0.01 NG/ML-MCNC: 2.34 NG/ML (ref 0–4.5)

## 2023-10-19 NOTE — RESULT ENCOUNTER NOTE
The following letter pertains to your most recent diagnostic tests:    Good news! -Your prostate specific antigen (PSA) test result returned normal.       Sincerely,    Dr. Fragoso

## 2023-11-24 ENCOUNTER — HOSPITAL ENCOUNTER (OUTPATIENT)
Facility: CLINIC | Age: 63
Discharge: HOME | End: 2023-11-24
Attending: INTERNAL MEDICINE | Admitting: INTERNAL MEDICINE
Payer: COMMERCIAL

## 2023-11-24 VITALS
OXYGEN SATURATION: 100 % | WEIGHT: 260 LBS | RESPIRATION RATE: 11 BRPM | BODY MASS INDEX: 33.37 KG/M2 | SYSTOLIC BLOOD PRESSURE: 140 MMHG | HEIGHT: 74 IN | DIASTOLIC BLOOD PRESSURE: 95 MMHG | HEART RATE: 64 BPM

## 2023-11-24 LAB
COLONOSCOPY: NORMAL
UPPER GI ENDOSCOPY: NORMAL

## 2023-11-24 PROCEDURE — 88305 TISSUE EXAM BY PATHOLOGIST: CPT | Mod: 26 | Performed by: PATHOLOGY

## 2023-11-24 PROCEDURE — 43239 EGD BIOPSY SINGLE/MULTIPLE: CPT | Performed by: INTERNAL MEDICINE

## 2023-11-24 PROCEDURE — 88305 TISSUE EXAM BY PATHOLOGIST: CPT | Mod: TC | Performed by: INTERNAL MEDICINE

## 2023-11-24 PROCEDURE — 258N000003 HC RX IP 258 OP 636: Performed by: INTERNAL MEDICINE

## 2023-11-24 PROCEDURE — 250N000013 HC RX MED GY IP 250 OP 250 PS 637: Performed by: INTERNAL MEDICINE

## 2023-11-24 PROCEDURE — 250N000011 HC RX IP 250 OP 636: Performed by: INTERNAL MEDICINE

## 2023-11-24 PROCEDURE — 99153 MOD SED SAME PHYS/QHP EA: CPT | Performed by: INTERNAL MEDICINE

## 2023-11-24 PROCEDURE — G0500 MOD SEDAT ENDO SERVICE >5YRS: HCPCS | Performed by: INTERNAL MEDICINE

## 2023-11-24 PROCEDURE — 250N000009 HC RX 250: Performed by: INTERNAL MEDICINE

## 2023-11-24 PROCEDURE — 45380 COLONOSCOPY AND BIOPSY: CPT | Mod: PT | Performed by: INTERNAL MEDICINE

## 2023-11-24 RX ORDER — SIMETHICONE 40MG/0.6ML
SUSPENSION, DROPS(FINAL DOSAGE FORM)(ML) ORAL PRN
Status: DISCONTINUED | OUTPATIENT
Start: 2023-11-24 | End: 2023-11-24 | Stop reason: HOSPADM

## 2023-11-24 RX ORDER — FENTANYL CITRATE 50 UG/ML
INJECTION, SOLUTION INTRAMUSCULAR; INTRAVENOUS PRN
Status: DISCONTINUED | OUTPATIENT
Start: 2023-11-24 | End: 2023-11-24 | Stop reason: HOSPADM

## 2023-11-24 RX ORDER — SODIUM CHLORIDE 9 MG/ML
INJECTION, SOLUTION INTRAVENOUS CONTINUOUS PRN
Status: DISCONTINUED | OUTPATIENT
Start: 2023-11-24 | End: 2023-11-24 | Stop reason: HOSPADM

## 2023-11-24 ASSESSMENT — ACTIVITIES OF DAILY LIVING (ADL): ADLS_ACUITY_SCORE: 35

## 2023-12-12 ENCOUNTER — PATIENT OUTREACH (OUTPATIENT)
Dept: GASTROENTEROLOGY | Facility: CLINIC | Age: 63
End: 2023-12-12
Payer: COMMERCIAL

## 2024-04-15 NOTE — RESULT ENCOUNTER NOTE
The following letter pertains to your most recent diagnostic tests:    Good news! Lung cancer screening CT is normal.  Let's recheck in one year.  Work on quitting smoking.        Sincerely,    Dr. Fragoso
Never smoker

## 2024-06-10 ENCOUNTER — HOSPITAL ENCOUNTER (EMERGENCY)
Facility: CLINIC | Age: 64
Discharge: HOME | End: 2024-06-10
Attending: EMERGENCY MEDICINE | Admitting: EMERGENCY MEDICINE
Payer: COMMERCIAL

## 2024-06-10 VITALS
SYSTOLIC BLOOD PRESSURE: 132 MMHG | HEART RATE: 87 BPM | TEMPERATURE: 98 F | OXYGEN SATURATION: 99 % | RESPIRATION RATE: 16 BRPM | DIASTOLIC BLOOD PRESSURE: 87 MMHG

## 2024-06-10 DIAGNOSIS — N47.2 PARAPHIMOSIS: ICD-10-CM

## 2024-06-10 DIAGNOSIS — N48.89 PENILE PAIN: ICD-10-CM

## 2024-06-10 PROCEDURE — 96372 THER/PROPH/DIAG INJ SC/IM: CPT | Performed by: EMERGENCY MEDICINE

## 2024-06-10 PROCEDURE — 99284 EMERGENCY DEPT VISIT MOD MDM: CPT | Mod: 25

## 2024-06-10 PROCEDURE — 250N000011 HC RX IP 250 OP 636: Mod: JZ | Performed by: EMERGENCY MEDICINE

## 2024-06-10 RX ORDER — MORPHINE SULFATE 4 MG/ML
8 INJECTION, SOLUTION INTRAMUSCULAR; INTRAVENOUS ONCE
Status: COMPLETED | OUTPATIENT
Start: 2024-06-10 | End: 2024-06-10

## 2024-06-10 RX ADMIN — MORPHINE SULFATE 8 MG: 4 INJECTION, SOLUTION INTRAMUSCULAR; INTRAVENOUS at 22:31

## 2024-06-10 ASSESSMENT — ACTIVITIES OF DAILY LIVING (ADL)
ADLS_ACUITY_SCORE: 35
ADLS_ACUITY_SCORE: 35

## 2024-06-10 ASSESSMENT — COLUMBIA-SUICIDE SEVERITY RATING SCALE - C-SSRS
2. HAVE YOU ACTUALLY HAD ANY THOUGHTS OF KILLING YOURSELF IN THE PAST MONTH?: NO
1. IN THE PAST MONTH, HAVE YOU WISHED YOU WERE DEAD OR WISHED YOU COULD GO TO SLEEP AND NOT WAKE UP?: NO
6. HAVE YOU EVER DONE ANYTHING, STARTED TO DO ANYTHING, OR PREPARED TO DO ANYTHING TO END YOUR LIFE?: NO

## 2024-06-11 ENCOUNTER — TELEPHONE (OUTPATIENT)
Dept: FAMILY MEDICINE | Facility: CLINIC | Age: 64
End: 2024-06-11
Payer: COMMERCIAL

## 2024-06-11 DIAGNOSIS — N47.2 PARAPHIMOSIS: Primary | ICD-10-CM

## 2024-06-11 NOTE — ED TRIAGE NOTES
Pt was taking a shower one hour ago and retracted foreskin to clean, unable to pull back. C/o increasing swelling.      Triage Assessment (Adult)       Row Name 06/10/24 9769          Respiratory WDL    Respiratory WDL WDL        Cardiac WDL    Cardiac WDL WDL        Cognitive/Neuro/Behavioral WDL    Cognitive/Neuro/Behavioral WDL WDL

## 2024-06-11 NOTE — ED PROVIDER NOTES
Emergency Department Note      History of Present Illness     Chief Complaint  Penis/Scrotum Problem    HPI  Ricki Acuna is a 63 year old male who presents to the emergency room with what appears to be a paraphimosis and pain at the tip of his penis.  He states that he was cleaning his foreskin and pulled it back over his penis, and could not get it to go back.  He states that he has had issues like this over the last several weeks, but has always been able to pull his foreskin back eventually.  He tried for an hour at home with Vaseline prior to coming in, and states that the tip of his penis is very red and swollen       Independent Historian  No    Review of External Notes  Yes I have reviewed the patient's last office visit when he was seen by his family practice doctor on 18 October of 2023 for routine general medical exam.      Past Medical History   Medical History and Problem List  Past Medical History:   Diagnosis Date    Adenomatous colon polyp 3-20-15    Hypertension     Primary osteoarthritis of right knee 9/28/2021    Skin cancer        Medications  clobetasol (TEMOVATE) 0.05 % external cream  clotrimazole (LOTRIMIN) 1 % external cream  olmesartan-hydrochlorothiazide (BENICAR HCT) 20-12.5 MG tablet        Surgical History   Past Surgical History:   Procedure Laterality Date    ABDOMEN SURGERY      CHOLECYSTECTOMY      open    COLONOSCOPY  3/20/15    hx polyps    COLONOSCOPY N/A 3/20/2015    Procedure: COMBINED COLONOSCOPY, SINGLE OR MULTIPLE BIOPSY/POLYPECTOMY BY BIOPSY;  Surgeon: Chun Joe MD;  Location:  GI    COLONOSCOPY N/A 10/27/2021    Procedure: COLONOSCOPY, FLEXIBLE, WITH LESION REMOVAL USING SNARE;  Surgeon: Rocio Stahl MD;  Location:  GI    COLONOSCOPY N/A 11/24/2023    Procedure: Colonoscopy;  Surgeon: Sherry Minaya MD;  Location: [facility]    ESOPHAGOSCOPY, GASTROSCOPY, DUODENOSCOPY (EGD), COMBINED N/A 9/29/2022    Procedure: ESOPHAGOGASTRODUODENOSCOPY, WITH  BIOPSY;  Surgeon: Avel Carver MD;  Location:  GI    ESOPHAGOSCOPY, GASTROSCOPY, DUODENOSCOPY (EGD), COMBINED N/A 11/24/2023    Procedure: Esophagoscopy, gastroscopy, duodenoscopy (EGD), combined;  Surgeon: Sherry Minaay MD;  Location:  GI    HERNIA REPAIR      inguinal hernia repair         Physical Exam   Patient Vitals for the past 24 hrs:   BP Temp Pulse Resp SpO2   06/10/24 2137 (!) 150/80 98  F (36.7  C) 90 16 98 %       Physical Exam  Vitals: reviewed by me  General: Pt seen on [facility], pleasant, cooperative, and alert to conversation  Eyes: Tracking well, clear conjunctiva BL  ENT: MMM, midline trachea.   Lungs: No tachypnea, no accessory muscle use. No respiratory distress.   CV: Rate as above  : Does have an obvious paraphimosis and the tip of his penis is very red and swollen, not easily reduced.  Quite well-appearing.  MSK: no joint effusion.  No evidence of trauma  Skin: No rash  Neuro: Clear speech and no facial droop.  Psych: Not RIS, no e/o AH/VH          ED Course    Medications Administered  Medications   morphine (PF) injection 8 mg (has no administration in time range)       Procedures  Paraphimosis reduction:    Using dry gauze, manual reduction was attempted, but eventually it became clear that the patient had pain, and at this point patient was given morphine, we compressed the tip of the penis for 15 minutes, and also set a bag of ice on it as well.  This too proved unsuccessful given the significant edema, and at this point we opted for dorsal penile block.  We were able to successfully reduce the paraphimosis and pull the foreskin into an anatomical location.  He has passed urine since this.        Social Determinants of Health adding to complexity of care  None      Disposition  The patient was discharged.       Medical Decision Making / Diagnosis         MDM  This is a very pleasant 63-year-old male who presents to the emergency room with what appears to be  paraphimosis.  There was a significant amount of edema and redness and pain initially associated with this as it sounds like he has been trying to reduce it at home using Vaseline for the last hour or so.  Eventually we had to do a dorsal block and this was actually quite successful as well as the compression timing since we are able to easily reduce this once the patient was anesthetized and compressed appropriately.  We watched him in the ER for period of observation and then the nurse told me that he had successfully urinated without issue.  He states that he has no discomfort, his penis is back in anatomical location, and he feels comfortable following with urology in the week ahead, red flags for when to come back to the ER were discussed in detail, patient is highly reliable appearing and I do think that he will come back if anything gets worse        ICD-10 Codes:    ICD-10-CM    1. Paraphimosis  N47.2       2. Penile pain  N48.89                 Jose Arevalo MD  06/10/24 9567

## 2024-06-11 NOTE — TELEPHONE ENCOUNTER
Patient Returning Call - REFERRAL REQUEST    Reason for call:  Appt - Pt was seen in ED last night 6/11, was advised by ED  to get a referral for a Urologist from PCP within 1 week     Information relayed to patient:  None     Patient has additional questions:  No    What are your questions/concerns:  No, Pt just needs referral for Urologist      Could we send this information to you in [facility] or would you prefer to receive a phone call?:   Patient would prefer a phone call   Okay to leave a detailed message?: Yes at Cell number on file:    Telephone Information:   Mobile [phone]

## 2024-06-11 NOTE — DISCHARGE INSTRUCTIONS
As we discussed, you did have a paraphimosis today, and we did have to give you a dose of morphine as part of this, so please do have someone take you home, or feel free to wait in the waiting room until you feel 100% normal.  He did have a paraphimosis and this needs to be addressed with your urologist in the next 1 week.  We were able to reduce the paraphimosis and it does not look like there is any emergent problem remaining at this time, but again this can happen in the future so do be certain that you check in with your urologist of your choosing in the next 1 week for more definitive management.  Come back to the ER immediately if this happens again or with any other concerns.

## 2024-06-20 ENCOUNTER — OFFICE VISIT (OUTPATIENT)
Dept: UROLOGY | Facility: CLINIC | Age: 64
End: 2024-06-20
Payer: COMMERCIAL

## 2024-06-20 VITALS
HEIGHT: 74 IN | SYSTOLIC BLOOD PRESSURE: 122 MMHG | WEIGHT: 250 LBS | DIASTOLIC BLOOD PRESSURE: 70 MMHG | BODY MASS INDEX: 32.08 KG/M2

## 2024-06-20 DIAGNOSIS — N47.2 PARAPHIMOSIS: ICD-10-CM

## 2024-06-20 DIAGNOSIS — N47.1 PHIMOSIS: Primary | ICD-10-CM

## 2024-06-20 PROCEDURE — 99203 OFFICE O/P NEW LOW 30 MIN: CPT | Performed by: STUDENT IN AN ORGANIZED HEALTH CARE EDUCATION/TRAINING PROGRAM

## 2024-06-20 RX ORDER — CEFAZOLIN SODIUM 2 G/50ML
2 SOLUTION INTRAVENOUS SEE ADMIN INSTRUCTIONS
Status: CANCELLED | OUTPATIENT
Start: 2024-06-20

## 2024-06-20 RX ORDER — CEFAZOLIN SODIUM 2 G/50ML
2 SOLUTION INTRAVENOUS
Status: CANCELLED | OUTPATIENT
Start: 2024-06-20

## 2024-06-20 ASSESSMENT — PAIN SCALES - GENERAL: PAINLEVEL: NO PAIN (0)

## 2024-06-20 NOTE — NURSING NOTE
Chief Complaint   Patient presents with    Phimosis     Pt has no urinary concerns.      Miri Head, EMT

## 2024-06-20 NOTE — PATIENT INSTRUCTIONS
Recommend ~one week off of work postop, then no heavy lifting or strenuous activity for two weeks total

## 2024-06-20 NOTE — LETTER
6/20/2024       RE: Ricki Acuna  [address] [place] [address]     Dear Colleague,    Thank you for referring your patient, Ricki Acuna, to the [facility] [place] [facility]. Please see a copy of my visit note below.    [facility]  Main Office: [address]  [address]  [place] [address]       CHIEF COMPLAINT:  phimosis    HISTORY:   62 yo uncircumcised male with phimosis and paraphimosis incident 6/10/2024    Over the past 6 months has had increasing difficulty in retracting foreskin. Last week had an episode of paraphimosis requiring ER visit and manual reduction after dorsal penile block      PAST MEDICAL HISTORY:   Past Medical History:   Diagnosis Date    Adenomatous colon polyp 03/20/2015    repeat in 3 years     Hernia, abdominal     Hypertension     Primary osteoarthritis of right knee 09/28/2021    Skin cancer     Dr. Antoine Aguirre MD DERMATOLOGY SPECIALIST        PAST SURGICAL HISTORY:   Past Surgical History:   Procedure Laterality Date    ABDOMEN SURGERY      CHOLECYSTECTOMY      open    COLONOSCOPY  03/20/2015    hx polyps    COLONOSCOPY N/A 03/20/2015    Procedure: COMBINED COLONOSCOPY, SINGLE OR MULTIPLE BIOPSY/POLYPECTOMY BY BIOPSY;  Surgeon: Chun Joe MD;  Location:  GI    COLONOSCOPY N/A 10/27/2021    Procedure: COLONOSCOPY, FLEXIBLE, WITH LESION REMOVAL USING SNARE;  Surgeon: Rocio Stahl MD;  Location:  GI    COLONOSCOPY N/A 11/24/2023    Procedure: Colonoscopy;  Surgeon: Sherry Minaya MD;  Location:  GI    ESOPHAGOSCOPY, GASTROSCOPY, DUODENOSCOPY (EGD), COMBINED N/A 09/29/2022    Procedure: ESOPHAGOGASTRODUODENOSCOPY, WITH BIOPSY;  Surgeon: Avel Carver MD;  Location:  GI    ESOPHAGOSCOPY, GASTROSCOPY, DUODENOSCOPY (EGD), COMBINED N/A 11/24/2023    Procedure: Esophagoscopy, gastroscopy, duodenoscopy (EGD), combined;  Surgeon: Sherry Minaya MD;   "Location:  GI    HERNIA REPAIR      inguinal hernia repair       FAMILY HISTORY:   Family History   Problem Relation Age of Onset    Cerebrovascular Disease Mother     Diabetes Mother     Prostate Cancer Father        SOCIAL HISTORY:   Social History     Tobacco Use    Smoking status: Former     Current packs/day: 0.00     Types: Cigarettes     Start date: 2020     Quit date: 2020     Years since quittin.4    Smokeless tobacco: Never   Substance Use Topics    Alcohol use: Yes     Alcohol/week: 7.0 standard drinks of alcohol     Comment: 2 drinks a week        No Known Allergies      Current Outpatient Medications:     olmesartan-hydrochlorothiazide (BENICAR HCT) 20-12.5 MG tablet, Take 1 tablet by mouth daily, Disp: 90 tablet, Rfl: 3    clobetasol (TEMOVATE) 0.05 % external cream, Apply topically 2 times daily Apply to bottom rash sparingly for no longer than 14 days without a several day break (Patient not taking: Reported on 10/18/2023), Disp: 60 g, Rfl: 11    clotrimazole (LOTRIMIN) 1 % external cream, Apply topically 2 times daily (Patient not taking: Reported on 10/18/2023), Disp: 60 g, Rfl: 11    Review Of Systems:  Skin: No rash, pruritis, or skin pigmentation  Eyes: No changes in vision  Ears/Nose/Throat: No changes in hearing, no nosebleeds  Respiratory: No shortness of breath, dyspnea on exertion, cough, or hemoptysis  Cardiovascular: No chest pain or palpitations  Gastrointestinal: No diarrhea or constipation. No abdominal pain. No hematochezia  Genitourinary: see HPI  Musculoskeletal: No pain or swelling of joints, normal range of motion  Neurologic: No weakness or tremors  Psychiatric: No recent changes in memory or mood  Hematologic/Lymphatic/Immunologic: No easy bruising or enlarged lymph nodes  Endocrine: No weight gain or loss      PHYSICAL EXAM:    /70   Ht 1.88 m (6' 2\")   Wt 113.4 kg (250 lb)   BMI 32.10 kg/m    General appearance: In NAD, conversant  HEENT: Normocephalic " "and atraumatic, anicteric sclera  Cardiovascular: Not examined  Respiratory: normal, non-labored breathing  Gastrointestinal: negative, Abdomen soft, non-tender, and non-distended.   Musculoskeletal: Not Examined  Peripheral Vascular/extremity: No peripheral edema  Skin: Normal temperature, turgor, and texture. No rash  Psychiatric: Appropriate affect, alert and oriented to person, place, and time    Penis: severe phimosis with cicatrix, cracking of skin    Cystoscopy: Not done      UA RESULTS:  No results for input(s): \"COLOR\", \"APPEARANCE\", \"URINEGLC\", \"URINEBILI\", \"URINEKETONE\", \"SG\", \"UBLD\", \"URINEPH\", \"PROTEIN\", \"UROBILINOGEN\", \"NITRITE\", \"LEUKEST\", \"RBCU\", \"WBCU\" in the last [id number] hours.    Bladder Scan:     Other Labs:      Imaging Studies: None      CLINICAL IMPRESSION:   62 yo uncircumcised male with phimosis and paraphimosis incident 6/10/2024    PLAN:   CIRCUMCISION     Christophe Farmer MD   [facility]  [phone] clinic phone    "

## 2024-06-20 NOTE — PROGRESS NOTES
[facility]  Main Office: [address]  [address]  [place] [address]       CHIEF COMPLAINT:  phimosis    HISTORY:   62 yo uncircumcised male with phimosis and paraphimosis incident 6/10/2024    Over the past 6 months has had increasing difficulty in retracting foreskin. Last week had an episode of paraphimosis requiring ER visit and manual reduction after dorsal penile block      PAST MEDICAL HISTORY:   Past Medical History:   Diagnosis Date    Adenomatous colon polyp 03/20/2015    repeat in 3 years     Hernia, abdominal     Hypertension     Primary osteoarthritis of right knee 09/28/2021    Skin cancer     Dr. Antoine Aguirre MD DERMATOLOGY SPECIALIST        PAST SURGICAL HISTORY:   Past Surgical History:   Procedure Laterality Date    ABDOMEN SURGERY      CHOLECYSTECTOMY      open    COLONOSCOPY  03/20/2015    hx polyps    COLONOSCOPY N/A 03/20/2015    Procedure: COMBINED COLONOSCOPY, SINGLE OR MULTIPLE BIOPSY/POLYPECTOMY BY BIOPSY;  Surgeon: Chun Joe MD;  Location:  GI    COLONOSCOPY N/A 10/27/2021    Procedure: COLONOSCOPY, FLEXIBLE, WITH LESION REMOVAL USING SNARE;  Surgeon: Rocio Stahl MD;  Location: [facility]    COLONOSCOPY N/A 11/24/2023    Procedure: Colonoscopy;  Surgeon: Sherry Minaya MD;  Location:  GI    ESOPHAGOSCOPY, GASTROSCOPY, DUODENOSCOPY (EGD), COMBINED N/A 09/29/2022    Procedure: ESOPHAGOGASTRODUODENOSCOPY, WITH BIOPSY;  Surgeon: Avel Carver MD;  Location: [facility]    ESOPHAGOSCOPY, GASTROSCOPY, DUODENOSCOPY (EGD), COMBINED N/A 11/24/2023    Procedure: Esophagoscopy, gastroscopy, duodenoscopy (EGD), combined;  Surgeon: Sherry Minaya MD;  Location:  GI    HERNIA REPAIR      inguinal hernia repair       FAMILY HISTORY:   Family History   Problem Relation Age of Onset    Cerebrovascular Disease Mother     Diabetes Mother     Prostate Cancer Father        SOCIAL HISTORY:   Social History     Tobacco Use    Smoking status: Former     Current  "packs/day: 0.00     Types: Cigarettes     Start date: 2020     Quit date: 2020     Years since quittin.4    Smokeless tobacco: Never   Substance Use Topics    Alcohol use: Yes     Alcohol/week: 7.0 standard drinks of alcohol     Comment: 2 drinks a week        No Known Allergies      Current Outpatient Medications:     olmesartan-hydrochlorothiazide (BENICAR HCT) 20-12.5 MG tablet, Take 1 tablet by mouth daily, Disp: 90 tablet, Rfl: 3    clobetasol (TEMOVATE) 0.05 % external cream, Apply topically 2 times daily Apply to bottom rash sparingly for no longer than 14 days without a several day break (Patient not taking: Reported on 10/18/2023), Disp: 60 g, Rfl: 11    clotrimazole (LOTRIMIN) 1 % external cream, Apply topically 2 times daily (Patient not taking: Reported on 10/18/2023), Disp: 60 g, Rfl: 11    Review Of Systems:  Skin: No rash, pruritis, or skin pigmentation  Eyes: No changes in vision  Ears/Nose/Throat: No changes in hearing, no nosebleeds  Respiratory: No shortness of breath, dyspnea on exertion, cough, or hemoptysis  Cardiovascular: No chest pain or palpitations  Gastrointestinal: No diarrhea or constipation. No abdominal pain. No hematochezia  Genitourinary: see HPI  Musculoskeletal: No pain or swelling of joints, normal range of motion  Neurologic: No weakness or tremors  Psychiatric: No recent changes in memory or mood  Hematologic/Lymphatic/Immunologic: No easy bruising or enlarged lymph nodes  Endocrine: No weight gain or loss      PHYSICAL EXAM:    /70   Ht 1.88 m (6' 2\")   Wt 113.4 kg (250 lb)   BMI 32.10 kg/m    General appearance: In NAD, conversant  HEENT: Normocephalic and atraumatic, anicteric sclera  Cardiovascular: Not examined  Respiratory: normal, non-labored breathing  Gastrointestinal: negative, Abdomen soft, non-tender, and non-distended.   Musculoskeletal: Not Examined  Peripheral Vascular/extremity: No peripheral edema  Skin: Normal temperature, turgor, and " "texture. No rash  Psychiatric: Appropriate affect, alert and oriented to person, place, and time    Penis: severe phimosis with cicatrix, cracking of skin    Cystoscopy: Not done      UA RESULTS:  No results for input(s): \"COLOR\", \"APPEARANCE\", \"URINEGLC\", \"URINEBILI\", \"URINEKETONE\", \"SG\", \"UBLD\", \"URINEPH\", \"PROTEIN\", \"UROBILINOGEN\", \"NITRITE\", \"LEUKEST\", \"RBCU\", \"WBCU\" in the last 13673 hours.    Bladder Scan:     Other Labs:      Imaging Studies: None      CLINICAL IMPRESSION:   62 yo uncircumcised male with phimosis and paraphimosis incident 6/10/2024    PLAN:   CIRCUMCISION     Christophe Farmer MD   [facility]  [phone] clinic phone    "

## 2024-08-05 ENCOUNTER — OFFICE VISIT (OUTPATIENT)
Dept: FAMILY MEDICINE | Facility: CLINIC | Age: 64
End: 2024-08-05
Payer: COMMERCIAL

## 2024-08-05 VITALS
RESPIRATION RATE: 16 BRPM | BODY MASS INDEX: 32.94 KG/M2 | HEIGHT: 74 IN | OXYGEN SATURATION: 97 % | HEART RATE: 73 BPM | WEIGHT: 256.7 LBS | TEMPERATURE: 97.2 F | DIASTOLIC BLOOD PRESSURE: 78 MMHG | SYSTOLIC BLOOD PRESSURE: 125 MMHG

## 2024-08-05 DIAGNOSIS — D50.9 IRON DEFICIENCY ANEMIA, UNSPECIFIED IRON DEFICIENCY ANEMIA TYPE: ICD-10-CM

## 2024-08-05 DIAGNOSIS — N47.2 PARAPHIMOSIS: ICD-10-CM

## 2024-08-05 DIAGNOSIS — R73.03 PREDIABETES: ICD-10-CM

## 2024-08-05 DIAGNOSIS — Z01.818 PREOP GENERAL PHYSICAL EXAM: Primary | ICD-10-CM

## 2024-08-05 LAB
CREAT SERPL-MCNC: 0.91 MG/DL (ref 0.67–1.17)
EGFRCR SERPLBLD CKD-EPI 2021: >90 ML/MIN/1.73M2
ERYTHROCYTE [DISTWIDTH] IN BLOOD BY AUTOMATED COUNT: 14.4 % (ref 10–15)
FERRITIN SERPL-MCNC: 14 NG/ML (ref 31–409)
HBA1C MFR BLD: 6.7 % (ref 0–5.6)
HCT VFR BLD AUTO: 37.2 % (ref 40–53)
HGB BLD-MCNC: 11.4 G/DL (ref 13.3–17.7)
IRON BINDING CAPACITY (ROCHE): 402 UG/DL (ref 240–430)
IRON SATN MFR SERPL: 13 % (ref 15–46)
IRON SERPL-MCNC: 52 UG/DL (ref 61–157)
MCH RBC QN AUTO: 25.4 PG (ref 26.5–33)
MCHC RBC AUTO-ENTMCNC: 30.6 G/DL (ref 31.5–36.5)
MCV RBC AUTO: 83 FL (ref 78–100)
PLATELET # BLD AUTO: 385 10E3/UL (ref 150–450)
POTASSIUM SERPL-SCNC: 3.7 MMOL/L (ref 3.4–5.3)
RBC # BLD AUTO: 4.48 10E6/UL (ref 4.4–5.9)
WBC # BLD AUTO: 7.8 10E3/UL (ref 4–11)

## 2024-08-05 PROCEDURE — 36415 COLL VENOUS BLD VENIPUNCTURE: CPT | Performed by: NURSE PRACTITIONER

## 2024-08-05 PROCEDURE — 84132 ASSAY OF SERUM POTASSIUM: CPT | Performed by: NURSE PRACTITIONER

## 2024-08-05 PROCEDURE — 83036 HEMOGLOBIN GLYCOSYLATED A1C: CPT | Performed by: NURSE PRACTITIONER

## 2024-08-05 PROCEDURE — 83550 IRON BINDING TEST: CPT | Performed by: NURSE PRACTITIONER

## 2024-08-05 PROCEDURE — 82728 ASSAY OF FERRITIN: CPT | Performed by: NURSE PRACTITIONER

## 2024-08-05 PROCEDURE — 82565 ASSAY OF CREATININE: CPT | Performed by: NURSE PRACTITIONER

## 2024-08-05 PROCEDURE — 85027 COMPLETE CBC AUTOMATED: CPT | Performed by: NURSE PRACTITIONER

## 2024-08-05 PROCEDURE — 99214 OFFICE O/P EST MOD 30 MIN: CPT | Performed by: NURSE PRACTITIONER

## 2024-08-05 PROCEDURE — 83540 ASSAY OF IRON: CPT | Performed by: NURSE PRACTITIONER

## 2024-08-05 RX ORDER — ASPIRIN 81 MG/1
81 TABLET ORAL DAILY
COMMUNITY

## 2024-08-05 ASSESSMENT — PAIN SCALES - GENERAL: PAINLEVEL: NO PAIN (0)

## 2024-08-05 NOTE — PATIENT INSTRUCTIONS
How to Take Your Medication Before Surgery  Preoperative Medication Instructions   Antiplatelet or Anticoagulation Medication Instructions   - aspirin: Discontinue aspirin 10 days prior to procedure to reduce bleeding risk. It should be resumed postoperatively.     Additional Medication Instructions  Take all scheduled medications on the day of surgery EXCEPT for modifications listed below:   - Diuretics: DO NOT TAKE on the day of surgery.       Patient Education   Preparing for Your Surgery  Getting started  A nurse will call you to review your health history and instructions. They will give you an arrival time based on your scheduled surgery time. Please be ready to share:  Your doctor's clinic name and phone number  Your medical, surgical, and anesthesia history  A list of allergies and sensitivities  A list of medicines, including herbal treatments and over-the-counter drugs  Whether the patient has a legal guardian (ask how to send us the papers in advance)  Please tell us if you're pregnant--or if there's any chance you might be pregnant. Some surgeries may injure a fetus (unborn baby), so they require a pregnancy test. Surgeries that are safe for a fetus don't always need a test, and you can choose whether to have one.   If you have a child who's having surgery, please ask for a copy of Preparing for Your Child's Surgery.    Preparing for surgery  Within 10 to 30 days of surgery: Have a pre-op exam (sometimes called an H&P, or History and Physical). This can be done at a clinic or pre-operative center.  If you're having a , you may not need this exam. Talk to your care team.  At your pre-op exam, talk to your care team about all medicines you take. If you need to stop any medicines before surgery, ask when to start taking them again.  We do this for your safety. Many medicines can make you bleed too much during surgery. Some change how well surgery (anesthesia) drugs work.  Call your insurance  company to let them know you're having surgery. (If you don't have insurance, call [phone].)  Call your clinic if there's any change in your health. This includes signs of a cold or flu (sore throat, runny nose, cough, rash, fever). It also includes a scrape or scratch near the surgery site.  If you have questions on the day of surgery, call your hospital or surgery center.  Eating and drinking guidelines  For your safety: Unless your surgeon tells you otherwise, follow the guidelines below.  Eat and drink as usual until 8 hours before you arrive for surgery. After that, no food or milk.  Drink clear liquids until 2 hours before you arrive. These are liquids you can see through, like water, Gatorade, and Propel Water. They also include plain black coffee and tea (no cream or milk), candy, and breath mints. You can spit out gum when you arrive.  If you drink alcohol: Stop drinking it the night before surgery.  If your care team tells you to take medicine on the morning of surgery, it's okay to take it with a sip of water.  Preventing infection  Shower or bathe the night before and morning of your surgery. Follow the instructions your clinic gave you. (If no instructions, use regular soap.)  Don't shave or clip hair near your surgery site. We'll remove the hair if needed.  Don't smoke or vape the morning of surgery. You may chew nicotine gum up to 2 hours before surgery. A nicotine patch is okay.  Note: Some surgeries require you to completely quit smoking and nicotine. Check with your surgeon.  Your care team will make every effort to keep you safe from infection. We will:  Clean our hands often with soap and water (or an alcohol-based hand rub).  Clean the skin at your surgery site with a special soap that kills germs.  Give you a special gown to keep you warm. (Cold raises the risk of infection.)  Wear special hair covers, masks, gowns and gloves during surgery.  Give antibiotic medicine, if prescribed. Not  all surgeries need antibiotics.  What to bring on the day of surgery  Photo ID and insurance card  Copy of your health care directive, if you have one  Glasses and hearing aids (bring cases)  You can't wear contacts during surgery  Inhaler and eye drops, if you use them (tell us about these when you arrive)  CPAP machine or breathing device, if you use them  A few personal items, if spending the night  If you have . . .  A pacemaker, ICD (cardiac defibrillator) or other implant: Bring the ID card.  An implanted stimulator: Bring the remote control.  A legal guardian: Bring a copy of the certified (court-stamped) guardianship papers.  Please remove any jewelry, including body piercings. Leave jewelry and other valuables at home.  If you're going home the day of surgery  You must have a responsible adult drive you home. They should stay with you overnight as well.  If you don't have someone to stay with you, and you aren't safe to go home alone, we may keep you overnight. Insurance often won't pay for this.  After surgery  If it's hard to control your pain or you need more pain medicine, please call your surgeon's office.  Questions?   If you have any questions for your care team, list them here: _________________________________________________________________________________________________________________________________________________________________________ ____________________________________ ____________________________________ ____________________________________  For informational purposes only. Not to replace the advice of your health care provider. Copyright   2003, 2019 [facility]. All rights reserved. Clinically reviewed by Amy Prescott MD. [employer] 527160 - REV 12/22.

## 2024-08-05 NOTE — PROGRESS NOTES
Preoperative Evaluation  [facility]  [address] [place] [address] [address] [facility], [address]  [facility] [address]  Phone: [phone]  Primary Provider: Bryan Fragoso MD  Pre-op Performing Provider: APRIL Barboza CNP  Aug 5, 2024             8/1/2024   Surgical Information   What procedure is being done? Circumcision   Facility or Hospital where procedure/surgery will be performed: [facility]   Who is doing the procedure / surgery? Dr. Christophe Farmer   Date of surgery / procedure: August 26   Time of surgery / procedure: 7:30 AM   Where do you plan to recover after surgery? at home with family        Fax number for surgical facility: Note does not need to be faxed, will be available electronically in Epic.    Assessment & Plan     The proposed surgical procedure is considered LOW risk.    Preop general physical exam  Ok for surgery. No concerns   - Potassium; Future  - Creatinine; Future  - Potassium  - Creatinine    Paraphimosis  Plan procedure     Iron deficiency anemia, unspecified iron deficiency anemia type  Recheck labs   - Ferritin; Future  - Iron and iron binding capacity; Future  - CBC with platelets; Future  - Ferritin  - Iron and iron binding capacity  - CBC with platelets    Prediabetes  A1C rechecked today. Will be seeing PCP soon to discuss new diabetes diagnosis    - Hemoglobin A1c            - No identified additional risk factors other than previously addressed    Preoperative Medication Instructions  Antiplatelet or Anticoagulation Medication Instructions   - aspirin: Discontinue aspirin 10 days prior to procedure to reduce bleeding risk. It should be resumed postoperatively.     Additional Medication Instructions  Take all scheduled medications on the day of surgery EXCEPT for modifications listed below:   - Diuretics: DO NOT TAKE on the day of surgery.    Recommendation  Approval given to proceed with proposed procedure, without further diagnostic evaluation.    Subjective    Ricki is a 63 year old, presenting for the following:  Pre-Op Exam        HPI related to upcoming procedure:   Going for circumcision  Has been feeling well. No fatigue.  On ASA for polyp prophylaxis           8/1/2024   Pre-Op Questionnaire   Have you ever had a heart attack or stroke? No   Have you ever had surgery on your heart or blood vessels, such as a stent placement, a coronary artery bypass, or surgery on an artery in your head, neck, heart, or legs? No   Do you have chest pain with activity? No   Do you have a history of heart failure? No   Do you currently have a cold, bronchitis or symptoms of other infection? No   Do you have a cough, shortness of breath, or wheezing? No   Do you or anyone in your family have previous history of blood clots? No   Do you or does anyone in your family have a serious bleeding problem such as prolonged bleeding following surgeries or cuts? No   Have you ever had problems with anemia or been told to take iron pills? No   Have you had any abnormal blood loss such as black, tarry or bloody stools? No   Have you ever had a blood transfusion? No   Are you willing to have a blood transfusion if it is medically needed before, during, or after your surgery? Yes   Have you or any of your relatives ever had problems with anesthesia? No   Do you have sleep apnea, excessive snoring or daytime drowsiness? No   Do you have any artifical heart valves or other implanted medical devices like a pacemaker, defibrillator, or continuous glucose monitor? No   Do you have artificial joints? No   Are you allergic to latex? No        Health Care Directive  Patient does not have a Health Care Directive or Living Will:     Preoperative Review of    reviewed - no record of controlled substances prescribed.      Status of Chronic Conditions:  See problem list for active medical problems.  Problems all longstanding and stable, except as noted/documented.  See ROS for pertinent symptoms related to  these conditions.    Patient Active Problem List    Diagnosis Date Noted    Primary osteoarthritis of right knee 09/28/2021     Priority: Medium    Morbid obesity (H) 08/31/2020     Priority: Medium    Hyperlipidemia LDL goal <100 07/21/2015     Priority: Medium    Benign essential hypertension      Priority: Medium    Adenomatous colon polyp      Priority: Medium      Past Medical History:   Diagnosis Date    Adenomatous colon polyp 03/20/2015    repeat in 3 years     Hernia, abdominal     Hypertension     Primary osteoarthritis of right knee 09/28/2021    Skin cancer     Dr. Antoine Aguirre MD DERMATOLOGY SPECIALIST      Past Surgical History:   Procedure Laterality Date    ABDOMEN SURGERY      CHOLECYSTECTOMY      open    COLONOSCOPY  03/20/2015    hx polyps    COLONOSCOPY N/A 03/20/2015    Procedure: COMBINED COLONOSCOPY, SINGLE OR MULTIPLE BIOPSY/POLYPECTOMY BY BIOPSY;  Surgeon: Chun Joe MD;  Location:  GI    COLONOSCOPY N/A 10/27/2021    Procedure: COLONOSCOPY, FLEXIBLE, WITH LESION REMOVAL USING SNARE;  Surgeon: Rocio Stahl MD;  Location:  GI    COLONOSCOPY N/A 11/24/2023    Procedure: Colonoscopy;  Surgeon: Sherry Minaya MD;  Location:  GI    ESOPHAGOSCOPY, GASTROSCOPY, DUODENOSCOPY (EGD), COMBINED N/A 09/29/2022    Procedure: ESOPHAGOGASTRODUODENOSCOPY, WITH BIOPSY;  Surgeon: Avel Carver MD;  Location: [facility]    ESOPHAGOSCOPY, GASTROSCOPY, DUODENOSCOPY (EGD), COMBINED N/A 11/24/2023    Procedure: Esophagoscopy, gastroscopy, duodenoscopy (EGD), combined;  Surgeon: Sherry Minaya MD;  Location:  GI    HERNIA REPAIR      inguinal hernia repair     Current Outpatient Medications   Medication Sig Dispense Refill    clobetasol (TEMOVATE) 0.05 % external cream Apply topically 2 times daily Apply to bottom rash sparingly for no longer than 14 days without a several day break (Patient not taking: Reported on 10/18/2023) 60 g 11    clotrimazole (LOTRIMIN) 1 % external cream  "Apply topically 2 times daily (Patient not taking: Reported on 10/18/2023) 60 g 11    olmesartan-hydrochlorothiazide (BENICAR HCT) 20-12.5 MG tablet Take 1 tablet by mouth daily 90 tablet 3       No Known Allergies     Social History     Tobacco Use    Smoking status: Former     Current packs/day: 0.00     Types: Cigarettes     Start date: 2020     Quit date: 2020     Years since quittin.5    Smokeless tobacco: Never   Substance Use Topics    Alcohol use: Yes     Alcohol/week: 7.0 standard drinks of alcohol     Comment: 2 drinks a week     Family History   Problem Relation Age of Onset    Cerebrovascular Disease Mother     Diabetes Mother     Prostate Cancer Father      History   Drug Use No             Review of Systems  Constitutional, neuro, ENT, endocrine, pulmonary, cardiac, gastrointestinal, genitourinary, musculoskeletal, integument and psychiatric systems are negative, except as otherwise noted.    Objective    /78 (BP Location: Left arm, Patient Position: Sitting, Cuff Size: Adult Regular)   Pulse 73   Temp 97.2  F (36.2  C) (Temporal)   Resp 16   Ht 1.88 m (6' 2\")   Wt 116.4 kg (256 lb 11.2 oz)   SpO2 97%   BMI 32.96 kg/m     Estimated body mass index is 32.1 kg/m  as calculated from the following:    Height as of 24: 1.88 m (6' 2\").    Weight as of 24: 113.4 kg (250 lb).  Physical Exam  GENERAL: alert and no distress  EYES: Eyes grossly normal to inspection, PERRL and conjunctivae and sclerae normal  RESP: lungs clear to auscultation - no rales, rhonchi or wheezes  CV: regular rate and rhythm, normal S1 S2, no S3 or S4, no murmur, click or rub, no peripheral edema  MS: no gross musculoskeletal defects noted, no edema  SKIN: no suspicious lesions or rashes  NEURO: Normal strength and tone, mentation intact and speech normal  PSYCH: mentation appears normal, affect normal/bright    Recent Labs   Lab Test 10/05/23  0911   HGB 11.2*         POTASSIUM 3.7   CR " 0.91   A1C 6.3*        Diagnostics  Recent Results (from the past 24 hour(s))   Hemoglobin A1c    Collection Time: 08/05/24  3:58 PM   Result Value Ref Range    Hemoglobin A1C 6.7 (H) 0.0 - 5.6 %   CBC with platelets    Collection Time: 08/05/24  3:58 PM   Result Value Ref Range    WBC Count 7.8 4.0 - 11.0 10e3/uL    RBC Count 4.48 4.40 - 5.90 10e6/uL    Hemoglobin 11.4 (L) 13.3 - 17.7 g/dL    Hematocrit 37.2 (L) 40.0 - 53.0 %    MCV 83 78 - 100 fL    MCH 25.4 (L) 26.5 - 33.0 pg    MCHC 30.6 (L) 31.5 - 36.5 g/dL    RDW 14.4 10.0 - 15.0 %    Platelet Count 385 150 - 450 10e3/uL      No EKG required, no history of coronary heart disease, significant arrhythmia, peripheral arterial disease or other structural heart disease.    Revised Cardiac Risk Index (RCRI)  The patient has the following serious cardiovascular risks for perioperative complications:   - No serious cardiac risks = 0 points     RCRI Interpretation: 0 points: Class I (very low risk - 0.4% complication rate)         Signed Electronically by: APRIL Barboza CNP  A copy of this evaluation report is provided to the requesting physician.

## 2024-08-08 NOTE — RESULT ENCOUNTER NOTE
Your iron is really low. You would qualify for iron infusions if you would like to do that. Otherwise you can take an iron supplement Mon, Wed, Fri such as ferrous sulfate. Let me know if you are interested in infusions. You are still good for surgery.   Annel

## 2024-08-25 ENCOUNTER — ANESTHESIA EVENT (OUTPATIENT)
Dept: SURGERY | Facility: CLINIC | Age: 64
End: 2024-08-25
Payer: COMMERCIAL

## 2024-08-25 NOTE — ANESTHESIA PREPROCEDURE EVALUATION
Anesthesia Pre-Procedure Evaluation    Patient: Ricki Acuna   MRN: [id number] : 1960        Procedure : Procedure(s):  CIRCUMCISION          Past Medical History:   Diagnosis Date     Adenomatous colon polyp 2015    repeat in 3 years      Hernia, abdominal      Hypertension      Primary osteoarthritis of right knee 2021     Skin cancer     Dr. Antoine Aguirre MD DERMATOLOGY SPECIALIST       Past Surgical History:   Procedure Laterality Date     ABDOMEN SURGERY       CHOLECYSTECTOMY      open     COLONOSCOPY  2015    hx polyps     COLONOSCOPY N/A 2015    Procedure: COMBINED COLONOSCOPY, SINGLE OR MULTIPLE BIOPSY/POLYPECTOMY BY BIOPSY;  Surgeon: Chun Joe MD;  Location:  GI     COLONOSCOPY N/A 10/27/2021    Procedure: COLONOSCOPY, FLEXIBLE, WITH LESION REMOVAL USING SNARE;  Surgeon: Rocio Stahl MD;  Location:  GI     COLONOSCOPY N/A 2023    Procedure: Colonoscopy;  Surgeon: Sherry Minaya MD;  Location: [facility]     ESOPHAGOSCOPY, GASTROSCOPY, DUODENOSCOPY (EGD), COMBINED N/A 2022    Procedure: ESOPHAGOGASTRODUODENOSCOPY, WITH BIOPSY;  Surgeon: Avel Carver MD;  Location: [facility]     ESOPHAGOSCOPY, GASTROSCOPY, DUODENOSCOPY (EGD), COMBINED N/A 2023    Procedure: Esophagoscopy, gastroscopy, duodenoscopy (EGD), combined;  Surgeon: Sherry Minaya MD;  Location: [facility]     HERNIA REPAIR      inguinal hernia repair      No Known Allergies   Social History     Tobacco Use     Smoking status: Former     Current packs/day: 0.00     Types: Cigarettes     Start date: 2020     Quit date: 2020     Years since quittin.6     Smokeless tobacco: Never   Substance Use Topics     Alcohol use: Yes     Alcohol/week: 7.0 standard drinks of alcohol     Comment: 2 drinks a week      Wt Readings from Last 1 Encounters:   24 116.4 kg (256 lb 11.2 oz)        Anesthesia Evaluation            ROS/MED HX  ENT/Pulmonary:  - neg pulmonary ROS   "(-) sleep apnea   Neurologic:  - neg neurologic ROS     Cardiovascular:     (+) Dyslipidemia hypertension- -   -  - -                                      METS/Exercise Tolerance:     Hematologic:     (+)      anemia,          Musculoskeletal:       GI/Hepatic:    (-) GERD   Renal/Genitourinary: Comment: Paraphimosis      Endo: Comment: PreDM    (+)  type II DM,   Not using insulin,          Obesity,       Psychiatric/Substance Use:  - neg psychiatric ROS     Infectious Disease:       Malignancy:       Other:            Physical Exam    Airway        Mallampati: II   TM distance: > 3 FB   Neck ROM: full   Mouth opening: > 3 cm    Respiratory Devices and Support         Dental       (+) Minor Abnormalities - some fillings, tiny chips      Cardiovascular   cardiovascular exam normal          Pulmonary   pulmonary exam normal            OUTSIDE LABS:  CBC:   Lab Results   Component Value Date    WBC 7.8 08/05/2024    WBC 6.3 10/05/2023    HGB 11.4 (L) 08/05/2024    HGB 11.2 (L) 10/05/2023    HCT 37.2 (L) 08/05/2024    HCT 37.1 (L) 10/05/2023     08/05/2024     10/05/2023     BMP:   Lab Results   Component Value Date     10/05/2023     09/22/2022    POTASSIUM 3.7 08/05/2024    POTASSIUM 3.7 10/05/2023    CHLORIDE 104 10/05/2023    CHLORIDE 101 09/22/2022    CO2 26 10/05/2023    CO2 29 09/22/2022    BUN 13.3 10/05/2023    BUN 8 10/11/2022    CR 0.91 08/05/2024    CR 0.91 10/05/2023     (H) 10/05/2023     (H) 09/22/2022     COAGS: No results found for: \"PTT\", \"INR\", \"FIBR\"  POC: No results found for: \"BGM\", \"HCG\", \"HCGS\"  HEPATIC:   Lab Results   Component Value Date    ALBUMIN 4.2 10/05/2023    PROTTOTAL 6.9 10/05/2023    ALT 28 10/05/2023    AST 21 10/05/2023    ALKPHOS 137 (H) 10/05/2023    BILITOTAL 0.4 10/05/2023     OTHER:   Lab Results   Component Value Date    A1C 6.7 (H) 08/05/2024    LUIGI 9.0 10/05/2023    PHOS 2.2 (L) 06/06/2016    LIPASE 190 02/06/2018    TSH 0.63 " "10/08/2021       Anesthesia Plan    ASA Status:  2    NPO Status:  NPO Appropriate    Anesthesia Type: General.     - Airway: LMA   Induction: Intravenous, Propofol.   Maintenance: Balanced.        Consents    Anesthesia Plan(s) and associated risks, benefits, and realistic alternatives discussed. Questions answered and patient/representative(s) expressed understanding.     - Discussed:     - Discussed with:  Patient            Postoperative Care    Pain management: IV analgesics.   PONV prophylaxis: Ondansetron (or other 5HT-3), Background Propofol Infusion     Comments:               Mehran Strauss DO, DO    I have reviewed the pertinent notes and labs in the chart from the past 30 days and (re)examined the patient.  Any updates or changes from those notes are reflected in this note.              # DMII: A1C = 6.7 % (Ref range: 0.0 - 5.6 %) within past 6 months  # Obesity: Estimated body mass index is 32.96 kg/m  as calculated from the following:    Height as of 8/5/24: 1.88 m (6' 2\").    Weight as of 8/5/24: 116.4 kg (256 lb 11.2 oz).      "

## 2024-08-26 ENCOUNTER — ANESTHESIA (OUTPATIENT)
Dept: SURGERY | Facility: CLINIC | Age: 64
End: 2024-08-26
Payer: COMMERCIAL

## 2024-08-26 ENCOUNTER — HOSPITAL ENCOUNTER (OUTPATIENT)
Facility: CLINIC | Age: 64
Discharge: HOME | End: 2024-08-26
Attending: STUDENT IN AN ORGANIZED HEALTH CARE EDUCATION/TRAINING PROGRAM | Admitting: STUDENT IN AN ORGANIZED HEALTH CARE EDUCATION/TRAINING PROGRAM
Payer: COMMERCIAL

## 2024-08-26 VITALS
HEART RATE: 66 BPM | DIASTOLIC BLOOD PRESSURE: 81 MMHG | HEIGHT: 74 IN | RESPIRATION RATE: 16 BRPM | SYSTOLIC BLOOD PRESSURE: 122 MMHG | WEIGHT: 254.1 LBS | OXYGEN SATURATION: 96 % | TEMPERATURE: 97.5 F | BODY MASS INDEX: 32.61 KG/M2

## 2024-08-26 DIAGNOSIS — N47.1 PHIMOSIS: Primary | ICD-10-CM

## 2024-08-26 PROCEDURE — 999N000141 HC STATISTIC PRE-PROCEDURE NURSING ASSESSMENT: Performed by: STUDENT IN AN ORGANIZED HEALTH CARE EDUCATION/TRAINING PROGRAM

## 2024-08-26 PROCEDURE — 54161 CIRCUM 28 DAYS OR OLDER: CPT | Performed by: ANESTHESIOLOGY

## 2024-08-26 PROCEDURE — 88304 TISSUE EXAM BY PATHOLOGIST: CPT | Mod: TC | Performed by: STUDENT IN AN ORGANIZED HEALTH CARE EDUCATION/TRAINING PROGRAM

## 2024-08-26 PROCEDURE — 250N000025 HC SEVOFLURANE, PER MIN: Performed by: STUDENT IN AN ORGANIZED HEALTH CARE EDUCATION/TRAINING PROGRAM

## 2024-08-26 PROCEDURE — 54161 CIRCUM 28 DAYS OR OLDER: CPT | Performed by: STUDENT IN AN ORGANIZED HEALTH CARE EDUCATION/TRAINING PROGRAM

## 2024-08-26 PROCEDURE — 250N000009 HC RX 250: Performed by: NURSE ANESTHETIST, CERTIFIED REGISTERED

## 2024-08-26 PROCEDURE — 250N000009 HC RX 250: Performed by: STUDENT IN AN ORGANIZED HEALTH CARE EDUCATION/TRAINING PROGRAM

## 2024-08-26 PROCEDURE — 54161 CIRCUM 28 DAYS OR OLDER: CPT | Performed by: NURSE ANESTHETIST, CERTIFIED REGISTERED

## 2024-08-26 PROCEDURE — 360N000075 HC SURGERY LEVEL 2, PER MIN: Performed by: STUDENT IN AN ORGANIZED HEALTH CARE EDUCATION/TRAINING PROGRAM

## 2024-08-26 PROCEDURE — 250N000011 HC RX IP 250 OP 636: Performed by: NURSE ANESTHETIST, CERTIFIED REGISTERED

## 2024-08-26 PROCEDURE — 710N000012 HC RECOVERY PHASE 2, PER MINUTE: Performed by: STUDENT IN AN ORGANIZED HEALTH CARE EDUCATION/TRAINING PROGRAM

## 2024-08-26 PROCEDURE — 710N000009 HC RECOVERY PHASE 1, LEVEL 1, PER MIN: Performed by: STUDENT IN AN ORGANIZED HEALTH CARE EDUCATION/TRAINING PROGRAM

## 2024-08-26 PROCEDURE — 250N000011 HC RX IP 250 OP 636: Performed by: STUDENT IN AN ORGANIZED HEALTH CARE EDUCATION/TRAINING PROGRAM

## 2024-08-26 PROCEDURE — 258N000003 HC RX IP 258 OP 636: Performed by: NURSE ANESTHETIST, CERTIFIED REGISTERED

## 2024-08-26 PROCEDURE — 370N000017 HC ANESTHESIA TECHNICAL FEE, PER MIN: Performed by: STUDENT IN AN ORGANIZED HEALTH CARE EDUCATION/TRAINING PROGRAM

## 2024-08-26 PROCEDURE — 272N000001 HC OR GENERAL SUPPLY STERILE: Performed by: STUDENT IN AN ORGANIZED HEALTH CARE EDUCATION/TRAINING PROGRAM

## 2024-08-26 RX ORDER — BUPIVACAINE HYDROCHLORIDE 5 MG/ML
INJECTION, SOLUTION EPIDURAL; INTRACAUDAL PRN
Status: DISCONTINUED | OUTPATIENT
Start: 2024-08-26 | End: 2024-08-26 | Stop reason: HOSPADM

## 2024-08-26 RX ORDER — HYDROMORPHONE HCL IN WATER/PF 6 MG/30 ML
0.2 PATIENT CONTROLLED ANALGESIA SYRINGE INTRAVENOUS EVERY 5 MIN PRN
Status: DISCONTINUED | OUTPATIENT
Start: 2024-08-26 | End: 2024-08-26 | Stop reason: HOSPADM

## 2024-08-26 RX ORDER — MAGNESIUM HYDROXIDE 1200 MG/15ML
LIQUID ORAL PRN
Status: DISCONTINUED | OUTPATIENT
Start: 2024-08-26 | End: 2024-08-26 | Stop reason: HOSPADM

## 2024-08-26 RX ORDER — FENTANYL CITRATE 50 UG/ML
INJECTION, SOLUTION INTRAMUSCULAR; INTRAVENOUS PRN
Status: DISCONTINUED | OUTPATIENT
Start: 2024-08-26 | End: 2024-08-26

## 2024-08-26 RX ORDER — CEFAZOLIN SODIUM/WATER 2 G/20 ML
2 SYRINGE (ML) INTRAVENOUS SEE ADMIN INSTRUCTIONS
Status: DISCONTINUED | OUTPATIENT
Start: 2024-08-26 | End: 2024-08-26 | Stop reason: HOSPADM

## 2024-08-26 RX ORDER — PROPOFOL 10 MG/ML
INJECTION, EMULSION INTRAVENOUS CONTINUOUS PRN
Status: DISCONTINUED | OUTPATIENT
Start: 2024-08-26 | End: 2024-08-26

## 2024-08-26 RX ORDER — DEXAMETHASONE SODIUM PHOSPHATE 4 MG/ML
INJECTION, SOLUTION INTRA-ARTICULAR; INTRALESIONAL; INTRAMUSCULAR; INTRAVENOUS; SOFT TISSUE PRN
Status: DISCONTINUED | OUTPATIENT
Start: 2024-08-26 | End: 2024-08-26

## 2024-08-26 RX ORDER — FENTANYL CITRATE 0.05 MG/ML
25 INJECTION, SOLUTION INTRAMUSCULAR; INTRAVENOUS EVERY 5 MIN PRN
Status: DISCONTINUED | OUTPATIENT
Start: 2024-08-26 | End: 2024-08-26 | Stop reason: HOSPADM

## 2024-08-26 RX ORDER — LIDOCAINE HYDROCHLORIDE 20 MG/ML
INJECTION, SOLUTION INFILTRATION; PERINEURAL PRN
Status: DISCONTINUED | OUTPATIENT
Start: 2024-08-26 | End: 2024-08-26

## 2024-08-26 RX ORDER — FENTANYL CITRATE 0.05 MG/ML
50 INJECTION, SOLUTION INTRAMUSCULAR; INTRAVENOUS EVERY 5 MIN PRN
Status: DISCONTINUED | OUTPATIENT
Start: 2024-08-26 | End: 2024-08-26 | Stop reason: HOSPADM

## 2024-08-26 RX ORDER — ONDANSETRON 2 MG/ML
4 INJECTION INTRAMUSCULAR; INTRAVENOUS EVERY 30 MIN PRN
Status: DISCONTINUED | OUTPATIENT
Start: 2024-08-26 | End: 2024-08-26 | Stop reason: HOSPADM

## 2024-08-26 RX ORDER — HYDROMORPHONE HCL IN WATER/PF 6 MG/30 ML
0.4 PATIENT CONTROLLED ANALGESIA SYRINGE INTRAVENOUS EVERY 5 MIN PRN
Status: DISCONTINUED | OUTPATIENT
Start: 2024-08-26 | End: 2024-08-26 | Stop reason: HOSPADM

## 2024-08-26 RX ORDER — PROPOFOL 10 MG/ML
INJECTION, EMULSION INTRAVENOUS PRN
Status: DISCONTINUED | OUTPATIENT
Start: 2024-08-26 | End: 2024-08-26

## 2024-08-26 RX ORDER — DOCUSATE SODIUM 100 MG/1
100 CAPSULE, LIQUID FILLED ORAL 2 TIMES DAILY
Qty: 30 CAPSULE | Refills: 0 | Status: SHIPPED | OUTPATIENT
Start: 2024-08-26

## 2024-08-26 RX ORDER — GINSENG 100 MG
CAPSULE ORAL PRN
Status: DISCONTINUED | OUTPATIENT
Start: 2024-08-26 | End: 2024-08-26 | Stop reason: HOSPADM

## 2024-08-26 RX ORDER — ONDANSETRON 4 MG/1
4 TABLET, ORALLY DISINTEGRATING ORAL EVERY 30 MIN PRN
Status: DISCONTINUED | OUTPATIENT
Start: 2024-08-26 | End: 2024-08-26 | Stop reason: HOSPADM

## 2024-08-26 RX ORDER — OXYCODONE HYDROCHLORIDE 5 MG/1
5 TABLET ORAL EVERY 6 HOURS PRN
Qty: 8 TABLET | Refills: 0 | Status: SHIPPED | OUTPATIENT
Start: 2024-08-26 | End: 2024-08-29

## 2024-08-26 RX ORDER — ONDANSETRON 2 MG/ML
INJECTION INTRAMUSCULAR; INTRAVENOUS PRN
Status: DISCONTINUED | OUTPATIENT
Start: 2024-08-26 | End: 2024-08-26

## 2024-08-26 RX ORDER — CEFAZOLIN SODIUM/WATER 2 G/20 ML
2 SYRINGE (ML) INTRAVENOUS
Status: COMPLETED | OUTPATIENT
Start: 2024-08-26 | End: 2024-08-26

## 2024-08-26 RX ORDER — NALOXONE HYDROCHLORIDE 0.4 MG/ML
0.1 INJECTION, SOLUTION INTRAMUSCULAR; INTRAVENOUS; SUBCUTANEOUS
Status: DISCONTINUED | OUTPATIENT
Start: 2024-08-26 | End: 2024-08-26 | Stop reason: HOSPADM

## 2024-08-26 RX ORDER — DEXAMETHASONE SODIUM PHOSPHATE 4 MG/ML
4 INJECTION, SOLUTION INTRA-ARTICULAR; INTRALESIONAL; INTRAMUSCULAR; INTRAVENOUS; SOFT TISSUE
Status: DISCONTINUED | OUTPATIENT
Start: 2024-08-26 | End: 2024-08-26 | Stop reason: HOSPADM

## 2024-08-26 RX ORDER — SODIUM CHLORIDE, SODIUM LACTATE, POTASSIUM CHLORIDE, CALCIUM CHLORIDE 600; 310; 30; 20 MG/100ML; MG/100ML; MG/100ML; MG/100ML
INJECTION, SOLUTION INTRAVENOUS CONTINUOUS PRN
Status: DISCONTINUED | OUTPATIENT
Start: 2024-08-26 | End: 2024-08-26

## 2024-08-26 RX ORDER — BACITRACIN ZINC 500 [USP'U]/G
OINTMENT TOPICAL 2 TIMES DAILY
Qty: 30 G | Refills: 0 | Status: SHIPPED | OUTPATIENT
Start: 2024-08-26

## 2024-08-26 RX ORDER — ACETAMINOPHEN 500 MG
1000 TABLET ORAL EVERY 6 HOURS PRN
Qty: 100 TABLET | Refills: 0 | Status: SHIPPED | OUTPATIENT
Start: 2024-08-26

## 2024-08-26 RX ORDER — SODIUM CHLORIDE, SODIUM LACTATE, POTASSIUM CHLORIDE, CALCIUM CHLORIDE 600; 310; 30; 20 MG/100ML; MG/100ML; MG/100ML; MG/100ML
INJECTION, SOLUTION INTRAVENOUS CONTINUOUS
Status: DISCONTINUED | OUTPATIENT
Start: 2024-08-26 | End: 2024-08-26 | Stop reason: HOSPADM

## 2024-08-26 RX ADMIN — ONDANSETRON 4 MG: 2 INJECTION INTRAMUSCULAR; INTRAVENOUS at 07:46

## 2024-08-26 RX ADMIN — PHENYLEPHRINE HYDROCHLORIDE 100 MCG: 10 INJECTION INTRAVENOUS at 07:56

## 2024-08-26 RX ADMIN — PHENYLEPHRINE HYDROCHLORIDE 100 MCG: 10 INJECTION INTRAVENOUS at 08:05

## 2024-08-26 RX ADMIN — DEXAMETHASONE SODIUM PHOSPHATE 4 MG: 4 INJECTION, SOLUTION INTRA-ARTICULAR; INTRALESIONAL; INTRAMUSCULAR; INTRAVENOUS; SOFT TISSUE at 07:46

## 2024-08-26 RX ADMIN — SODIUM CHLORIDE, POTASSIUM CHLORIDE, SODIUM LACTATE AND CALCIUM CHLORIDE: 600; 310; 30; 20 INJECTION, SOLUTION INTRAVENOUS at 08:34

## 2024-08-26 RX ADMIN — FENTANYL CITRATE 50 MCG: 50 INJECTION INTRAMUSCULAR; INTRAVENOUS at 07:38

## 2024-08-26 RX ADMIN — PROPOFOL 50 MCG/KG/MIN: 10 INJECTION, EMULSION INTRAVENOUS at 07:41

## 2024-08-26 RX ADMIN — SODIUM CHLORIDE, POTASSIUM CHLORIDE, SODIUM LACTATE AND CALCIUM CHLORIDE: 600; 310; 30; 20 INJECTION, SOLUTION INTRAVENOUS at 07:33

## 2024-08-26 RX ADMIN — PROPOFOL 200 MG: 10 INJECTION, EMULSION INTRAVENOUS at 07:38

## 2024-08-26 RX ADMIN — PHENYLEPHRINE HYDROCHLORIDE 50 MCG: 10 INJECTION INTRAVENOUS at 07:47

## 2024-08-26 RX ADMIN — LIDOCAINE HYDROCHLORIDE 60 MG: 20 INJECTION, SOLUTION INFILTRATION; PERINEURAL at 07:38

## 2024-08-26 RX ADMIN — MIDAZOLAM 2 MG: 1 INJECTION INTRAMUSCULAR; INTRAVENOUS at 07:34

## 2024-08-26 RX ADMIN — FENTANYL CITRATE 50 MCG: 50 INJECTION INTRAMUSCULAR; INTRAVENOUS at 07:43

## 2024-08-26 RX ADMIN — Medication 2 G: at 07:30

## 2024-08-26 ASSESSMENT — ACTIVITIES OF DAILY LIVING (ADL)
ADLS_ACUITY_SCORE: 35

## 2024-08-26 NOTE — ANESTHESIA POSTPROCEDURE EVALUATION
Patient: Ricki Acuna    Procedure: Procedure(s):  CIRCUMCISION WITH DORSAL PENILE BLOCK       Anesthesia Type:  General    Note:     Postop Pain Control: Uneventful            Sign Out: Well controlled pain   PONV: No   Neuro/Psych: Uneventful            Sign Out: Acceptable/Baseline neuro status   Airway/Respiratory: Uneventful            Sign Out: Acceptable/Baseline resp. status   CV/Hemodynamics: Uneventful            Sign Out: Acceptable CV status; No obvious hypovolemia; No obvious fluid overload   Other NRE: NONE   DID A NON-ROUTINE EVENT OCCUR?        Last vitals:  Vitals Value Taken Time   /88 08/26/24 0910   Temp 36.2  C (97.2  F) 08/26/24 0910   Pulse 64 08/26/24 0911   Resp 14 08/26/24 0911   SpO2 92 % 08/26/24 0912   Vitals shown include unfiled device data.    Electronically Signed By: Mehran Strauss DO, DO  August 26, 2024  2:31 PM

## 2024-08-26 NOTE — ANESTHESIA CARE TRANSFER NOTE
Patient: Ricki Acuna    Procedure: Procedure(s):  CIRCUMCISION WITH DORSAL PENILE BLOCK       Diagnosis: Phimosis [N47.1]  Diagnosis Additional Information: No value filed.    Anesthesia Type:   General     Note:    Oropharynx: spontaneously breathing  Level of Consciousness: awake  Oxygen Supplementation: face mask  Level of Supplemental Oxygen (L/min / FiO2): 6  Independent Airway: airway patency satisfactory and stable  Dentition: dentition unchanged  Vital Signs Stable: post-procedure vital signs reviewed and stable  Report to RN Given: handoff report given  Patient transferred to: PACU  Comments: At end of procedure, spontaneous respirations, adequate tidal volumes, followed commands to voice, LMA removed atraumatically, oropharynx suctioned, airway patent after LMA removal. Oxygen via facemask at 6 liters per minute to PACU. Oxygen tubing connected to wall O2 in PACU, SpO2, NiBP, and EKG monitors and alarms on and functioning, Tristan Hugger warmer connected to patient gown, report on patient's clinical status given to PACU RN, RN questions answered.        Handoff Report: Identifed the Patient, Identified the Reponsible Provider, Reviewed the pertinent medical history, Discussed the surgical course, Reviewed Intra-OP anesthesia mangement and issues during anesthesia, Set expectations for post-procedure period and Allowed opportunity for questions and acknowledgement of understanding      Vitals:  Vitals Value Taken Time   BP     Temp     Pulse     Resp     SpO2         Electronically Signed By: APRIL Mckeon CRNA  August 26, 2024  8:42 AM

## 2024-08-26 NOTE — DISCHARGE INSTRUCTIONS
Same Day Surgery Discharge Instructions for  Sedation and General Anesthesia     It's not unusual to feel dizzy, light-headed or faint for up to 24 hours after surgery or while taking pain medication.  If you have these symptoms: sit for a few minutes before standing and have someone assist you when you get up to walk or use the bathroom.    You should rest and relax for the next 24 hours. We recommend you make arrangements to have an adult stay with you for at least 24 hours after your discharge.  Avoid hazardous and strenuous activity.    DO NOT DRIVE any vehicle or operate mechanical equipment for 24 hours following the end of your surgery.  Even though you may feel normal, your reactions may be affected by the medication you have received.    Do not drink alcoholic beverages for 24 hours following surgery.     Slowly progress to your regular diet as you feel able. It's not unusual to feel nauseated and/or vomit after receiving anesthesia.  If you develop these symptoms, drink clear liquids (apple juice, ginger ale, broth, 7-up, etc. ) until you feel better.  If your nausea and vomiting persists for 24 hours, please notify your surgeon.      All narcotic pain medications, along with inactivity and anesthesia, can cause constipation. Drinking plenty of liquids and increasing fiber intake will help.    For any questions of a medical nature, call your surgeon.    Do not make important decisions for 24 hours.    If you had general anesthesia, you may have a sore throat for a couple of days related to the breathing tube used during surgery.  You may use Cepacol lozenges to help with this discomfort.  If it worsens or if you develop a fever, contact your surgeon.     If you feel your pain is not well managed with the pain medications prescribed by your surgeon, please contact your surgeon's office to let them know so they can address your concerns.         POSTOPERATIVE  INSTRUCTIONS    Diagnosis-------------------------------   phimosis    Procedure-------------------------------  Procedure(s) (LRB):  CIRCUMCISION WITH DORSAL PENILE BLOCK (N/A)      Findings--------------------------------  Tight phimosis with cicatrix requiring dorsal slit to expose glans  Sleeve type circumcision performed in usual fashion, closed with interrupted 3-0 chromic sutures.    Home-going instructions-----------------         Activity Limitation:     - No driving or operating heavy machinery while on narcotic pain medication.   - NO heaving lifting or strenuous exercise for two weeks    FOLLOW THESE INSTRUCTIONS AS INDICATED BELOW:  - Observe operative area for signs of excessive bleeding.  - You may shower. Do not take tub baths/go swimming for two weeks or until the wound has completely healed at the level of the skin  - Increase fluid intake to promote clear urine.  - Resume usual diet as tolerated  - no sexual activity for one month    What to expect while recovering-----------  - You will notice some bruising and swelling of the penis. Let our office know if the swelling becomes very tense and painful  - You may use ice as needed for pain. Do not use ice for longer than 10 minutes at a time, and make sure you use a towel between the ice pack and your skin to avoid damage.  - your incision is closed with absorbable sutures which will fall out on their own over the next few weeks    Discharge Medications/instructions:   - Take Tylenol 1000mg every 6 hours for pain  - Take Oxycodone 5mg every 6 hours for pain not relieved by Tylenol  - Take Colace while taking Oxycodone to prevent constipation  - apply bacitracin twice a day to the incision until well healed      Questions/concerns------------------------  [facility]: [phone]    Future appointments  Follow up as needed as urologic issues arise      Christophe Farmer MD

## 2024-08-26 NOTE — OP NOTE
[facility]  Operative Note    Pre-operative diagnosis: Phimosis [N47.1]   Post-operative diagnosis Phimosis [N47.1]   Procedure: Procedure(s):  CIRCUMCISION WITH DORSAL PENILE BLOCK   Surgeon: Christophe Farmer MD   Assistants(s): none   Anesthesia: General with Block    Estimated blood loss: Minimal    Total IV fluids: (See anesthesia record)   Blood transfusion: No transfusion was given during surgery   Total urine output: Not measured   Drains: None   Specimens: ID Type Source Tests Collected by Time Destination   1 : foreskin Tissue Penis SURGICAL PATHOLOGY EXAM Christophe Farmer MD 8/26/2024  8:06 AM         Implants: * No implants in log *       Findings:   Tight phimosis with cicatrix requiring dorsal slit to expose glans  Sleeve type circumcision performed in usual fashion, closed with interrupted 3-0 chromic sutures.   Complications: None.   Condition: Stable               Description of procedure:  63-year-old man with history of phimosis and paraphimosis incident in June 2024 who presents today for circumcision.  Risks of circumcision including bleeding, infection, poor cosmesis were discussed.  Informed consent was obtained.    The patient was brought to operating room #19.  Preop antibiotics were given prior to the procedure.  General anesthesia was induced, he was placed in supine position, prepped and draped in standard sterile fashion.  A timeout was performed.    On inspection he had a very tight cicatrix and I could not retract the foreskin over the glans.  A dorsal slit was performed using a straight clamp and scissors to incise the foreskin enough to be able to retract the foreskin behind the coronal sulcus.  A 3-0 silk glans traction stitch was placed.  Excess foreskin including the cicatrix was then marked out using a marking pen.  The skin was then incised circumferentially using a #15 blade.  The excess foreskin was then dissected using combination of sharp and  cautery dissection.  Specimen was sent for permanent pathology.  Hemostasis was achieved using coagulation settings.  The mucosal collar was then reapproximated circumferentially using 3-0 chromic interrupted sutures.  Patient was cleaned up and bacitracin, gauze Paola and Coban were used for final dressing.  The glass traction stitch was removed and pressure was placed for hemostasis.  Patient was cleaned up, awoke from anesthesia and brought to PACU in stable condition    Patient will discharge home and remove dressing POD#1    Christophe Farmer MD   [facility]  [phone] clinic phone

## 2024-08-26 NOTE — ANESTHESIA PROCEDURE NOTES
Airway       Patient location: [facility] - Operating Room or Procedural Area.  Staff -        Anesthesiologist:  Mehran Strauss DO       CRNA: Katelynn York APRN CRNA       Performed By: CRNA  Consent for Airway        Urgency: elective  Indications and Patient Condition       Indications for airway management: cooper-procedural       Induction type:intravenous      Final Airway Details       Final airway type: supraglottic airway    Supraglottic Airway Details        Type: LMA       Brand: Ambu AuraGain       LMA size: 5    Post intubation assessment        Placement verified by: capnometry, equal breath sounds and chest rise        Number of attempts at approach: 1       Number of other approaches attempted: 0       Ease of procedure: easy       Dentition: Intact and Unchanged

## 2024-08-27 PROCEDURE — 88304 TISSUE EXAM BY PATHOLOGIST: CPT | Mod: 26 | Performed by: PATHOLOGY

## 2024-09-09 DIAGNOSIS — I10 BENIGN ESSENTIAL HYPERTENSION: ICD-10-CM

## 2024-09-09 RX ORDER — OLMESARTAN MEDOXOMIL AND HYDROCHLOROTHIAZIDE 20/12.5 20; 12.5 MG/1; MG/1
1 TABLET ORAL DAILY
Qty: 90 TABLET | Refills: 0 | Status: SHIPPED | OUTPATIENT
Start: 2024-09-09

## 2024-10-11 DIAGNOSIS — I10 BENIGN ESSENTIAL HYPERTENSION: ICD-10-CM

## 2024-10-14 ENCOUNTER — MYC REFILL (OUTPATIENT)
Dept: FAMILY MEDICINE | Facility: CLINIC | Age: 64
End: 2024-10-14
Payer: COMMERCIAL

## 2024-10-14 DIAGNOSIS — I10 BENIGN ESSENTIAL HYPERTENSION: ICD-10-CM

## 2024-10-14 RX ORDER — OLMESARTAN MEDOXOMIL AND HYDROCHLOROTHIAZIDE 20/12.5 20; 12.5 MG/1; MG/1
1 TABLET ORAL DAILY
Qty: 90 TABLET | Refills: 0 | OUTPATIENT
Start: 2024-10-14

## 2024-10-15 RX ORDER — OLMESARTAN MEDOXOMIL AND HYDROCHLOROTHIAZIDE 20/12.5 20; 12.5 MG/1; MG/1
1 TABLET ORAL DAILY
Qty: 90 TABLET | Refills: 0 | Status: SHIPPED | OUTPATIENT
Start: 2024-10-15

## 2024-10-19 SDOH — HEALTH STABILITY: PHYSICAL HEALTH: ON AVERAGE, HOW MANY DAYS PER WEEK DO YOU ENGAGE IN MODERATE TO STRENUOUS EXERCISE (LIKE A BRISK WALK)?: 2 DAYS

## 2024-10-19 SDOH — HEALTH STABILITY: PHYSICAL HEALTH: ON AVERAGE, HOW MANY MINUTES DO YOU ENGAGE IN EXERCISE AT THIS LEVEL?: 10 MIN

## 2024-10-19 ASSESSMENT — SOCIAL DETERMINANTS OF HEALTH (SDOH): HOW OFTEN DO YOU GET TOGETHER WITH FRIENDS OR RELATIVES?: MORE THAN THREE TIMES A WEEK

## 2024-10-24 ENCOUNTER — OFFICE VISIT (OUTPATIENT)
Dept: FAMILY MEDICINE | Facility: CLINIC | Age: 64
End: 2024-10-24
Payer: COMMERCIAL

## 2024-10-24 VITALS
WEIGHT: 256.4 LBS | DIASTOLIC BLOOD PRESSURE: 77 MMHG | HEART RATE: 90 BPM | SYSTOLIC BLOOD PRESSURE: 127 MMHG | RESPIRATION RATE: 18 BRPM | TEMPERATURE: 98.6 F | OXYGEN SATURATION: 96 % | HEIGHT: 72 IN | BODY MASS INDEX: 34.73 KG/M2

## 2024-10-24 DIAGNOSIS — E66.09 CLASS 1 OBESITY DUE TO EXCESS CALORIES WITH SERIOUS COMORBIDITY AND BODY MASS INDEX (BMI) OF 34.0 TO 34.9 IN ADULT: ICD-10-CM

## 2024-10-24 DIAGNOSIS — R73.03 PREDIABETES: ICD-10-CM

## 2024-10-24 DIAGNOSIS — Z12.5 SCREENING FOR PROSTATE CANCER: ICD-10-CM

## 2024-10-24 DIAGNOSIS — I10 BENIGN ESSENTIAL HYPERTENSION: ICD-10-CM

## 2024-10-24 DIAGNOSIS — E66.811 CLASS 1 OBESITY DUE TO EXCESS CALORIES WITH SERIOUS COMORBIDITY AND BODY MASS INDEX (BMI) OF 34.0 TO 34.9 IN ADULT: ICD-10-CM

## 2024-10-24 DIAGNOSIS — E78.5 HYPERLIPIDEMIA LDL GOAL <100: ICD-10-CM

## 2024-10-24 DIAGNOSIS — Z87.891 PERSONAL HISTORY OF TOBACCO USE: ICD-10-CM

## 2024-10-24 DIAGNOSIS — Z00.00 ROUTINE GENERAL MEDICAL EXAMINATION AT A HEALTH CARE FACILITY: Primary | ICD-10-CM

## 2024-10-24 LAB
ERYTHROCYTE [DISTWIDTH] IN BLOOD BY AUTOMATED COUNT: 14.1 % (ref 10–15)
HCT VFR BLD AUTO: 38.5 % (ref 40–53)
HGB BLD-MCNC: 12 G/DL (ref 13.3–17.7)
MCH RBC QN AUTO: 26.7 PG (ref 26.5–33)
MCHC RBC AUTO-ENTMCNC: 31.2 G/DL (ref 31.5–36.5)
MCV RBC AUTO: 86 FL (ref 78–100)
PLATELET # BLD AUTO: 374 10E3/UL (ref 150–450)
RBC # BLD AUTO: 4.5 10E6/UL (ref 4.4–5.9)
WBC # BLD AUTO: 8.8 10E3/UL (ref 4–11)

## 2024-10-24 PROCEDURE — 99396 PREV VISIT EST AGE 40-64: CPT | Performed by: INTERNAL MEDICINE

## 2024-10-24 PROCEDURE — 36415 COLL VENOUS BLD VENIPUNCTURE: CPT | Performed by: INTERNAL MEDICINE

## 2024-10-24 PROCEDURE — 80053 COMPREHEN METABOLIC PANEL: CPT | Performed by: INTERNAL MEDICINE

## 2024-10-24 PROCEDURE — G0103 PSA SCREENING: HCPCS | Performed by: INTERNAL MEDICINE

## 2024-10-24 PROCEDURE — 85027 COMPLETE CBC AUTOMATED: CPT | Performed by: INTERNAL MEDICINE

## 2024-10-24 PROCEDURE — 80061 LIPID PANEL: CPT | Performed by: INTERNAL MEDICINE

## 2024-10-24 ASSESSMENT — PAIN SCALES - GENERAL: PAINLEVEL_OUTOF10: NO PAIN (0)

## 2024-10-24 NOTE — PATIENT INSTRUCTIONS
Patient Education   Preventive Care Advice   This is general advice given by our system to help you stay healthy. However, your care team may have specific advice just for you. Please talk to your care team about your preventive care needs.  Nutrition  Eat 5 or more servings of fruits and vegetables each day.  Try wheat bread, brown rice and whole grain pasta (instead of white bread, rice, and pasta).  Get enough calcium and vitamin D. Check the label on foods and aim for 100% of the RDA (recommended daily allowance).  Lifestyle  Exercise at least 150 minutes each week  (30 minutes a day, 5 days a week).  Do muscle strengthening activities 2 days a week. These help control your weight and prevent disease.  No smoking.  Wear sunscreen to prevent skin cancer.  Have a dental exam and cleaning every 6 months.  Yearly exams  See your health care team every year to talk about:  Any changes in your health.  Any medicines your care team has prescribed.  Preventive care, family planning, and ways to prevent chronic diseases.  Shots (vaccines)   HPV shots (up to age 26), if you've never had them before.  Hepatitis B shots (up to age 59), if you've never had them before.  COVID-19 shot: Get this shot when it's due.  Flu shot: Get a flu shot every year.  Tetanus shot: Get a tetanus shot every 10 years.  Pneumococcal, hepatitis A, and RSV shots: Ask your care team if you need these based on your risk.  Shingles shot (for age 50 and up)  General health tests  Diabetes screening:  Starting at age 35, Get screened for diabetes at least every 3 years.  If you are younger than age 35, ask your care team if you should be screened for diabetes.  Cholesterol test: At age 39, start having a cholesterol test every 5 years, or more often if advised.  Bone density scan (DEXA): At age 50, ask your care team if you should have this scan for osteoporosis (brittle bones).  Hepatitis C: Get tested at least once in your life.  STIs (sexually  transmitted infections)  Before age 24: Ask your care team if you should be screened for STIs.  After age 24: Get screened for STIs if you're at risk. You are at risk for STIs (including HIV) if:  You are sexually active with more than one person.  You don't use condoms every time.  You or a partner was diagnosed with a sexually transmitted infection.  If you are at risk for HIV, ask about PrEP medicine to prevent HIV.  Get tested for HIV at least once in your life, whether you are at risk for HIV or not.  Cancer screening tests  Cervical cancer screening: If you have a cervix, begin getting regular cervical cancer screening tests starting at age 21.  Breast cancer scan (mammogram): If you've ever had breasts, begin having regular mammograms starting at age 40. This is a scan to check for breast cancer.  Colon cancer screening: It is important to start screening for colon cancer at age 45.  Have a colonoscopy test every 10 years (or more often if you're at risk) Or, ask your provider about stool tests like a FIT test every year or Cologuard test every 3 years.  To learn more about your testing options, visit:   .  For help making a decision, visit:   https://bit.ly/it62959.  Prostate cancer screening test: If you have a prostate, ask your care team if a prostate cancer screening test (PSA) at age 55 is right for you.  Lung cancer screening: If you are a current or former smoker ages 50 to 80, ask your care team if ongoing lung cancer screenings are right for you.  For informational purposes only. Not to replace the advice of your health care provider. Copyright   2023 [employer]. All rights reserved. Clinically reviewed by the [facility]. 1Day[employer] 622111 - REV 01/24.

## 2024-10-24 NOTE — PROGRESS NOTES
Preventive Care Visit  [facility] CLARISSA Fragoso MD, Internal Medicine  Oct 24, 2024      Assessment & Plan     Routine general medical examination at a health care facility    - Comprehensive metabolic panel; Future  - CBC with platelets; Future    Benign essential hypertension  Well controlled on current olmesartan hydrochlorothiazide ; checking labs     Hyperlipidemia LDL goal <100  Not on statin theapy, but ascvd risk high  Obtain CAC to further characterize  He would start statin if calcium present only   - Lipid panel reflex to direct LDL Non-fasting; Future  - CT Coronary Calcium Scan; Future    Screening for prostate cancer    - PROSTATE SPEC ANTIGEN SCREEN; Future    Class 1 obesity due to excess calories with serious comorbidity and body mass index (BMI) of 34.0 to 34.9 in adult  Counseled on diet and exercise interventions to promote weight loss     Personal history of tobacco use    - CT Chest Lung Cancer Screen Low Dose Without; Future    Prediabetes  Discussed that most recent A1c which is about 2 months ago was actually in the diabetic range.  Discussed starting a GLP-1 to help him with his weight loss goals and with glycemic control and to prevent endorgan damage and complications of high blood sugars.  He would prefer to use diet and exercise modification and feels motivated to start exercising.  We discussed that it takes at least 3 months between A1c test to resolve a difference but he would like to come back in a month and recheck A1c.  If A1c remains greater than 6.5, I do think that starting a GLP-1 would be a good option for him.  - Hemoglobin A1c; Future    Patient has been advised of split billing requirements and indicates understanding: Yes        BMI  Estimated body mass index is 34.77 kg/m  as calculated from the following:    Height as of this encounter: 1.829 m (6').    Weight as of this encounter: 116.3 kg (256 lb 6.4 oz).   Weight management plan: Discussed  healthy diet and exercise guidelines    Counseling  Appropriate preventive services were addressed with this patient via screening, questionnaire, or discussion as appropriate for fall prevention, nutrition, physical activity, Tobacco-use cessation, social engagement, weight loss and cognition.  Checklist reviewing preventive services available has been given to the patient.  Reviewed patient's diet, addressing concerns and/or questions.   He is at risk for lack of exercise and has been provided with information to increase physical activity for the benefit of his well-being.   He declined my recommendation for a COVID shot or a flu shot today.  However, he is committed to getting a shingles shot at a pharmacy    FUTURE APPOINTMENTS:       - Follow-up for annual visit or as needed    Subjective   Ricki is a 63 year old, presenting for the following:  Physical           HPI          Health Care Directive  Patient does not have a Health Care Directive: Discussed advance care planning with patient; information given to patient to review.      10/19/2024   General Health   How would you rate your overall physical health? Excellent            10/19/2024   Nutrition   Three or more servings of calcium each day? Yes   Diet: I don't know   How many servings of fruit and vegetables per day? (!) 0-1   How many sweetened beverages each day? 0-1            10/19/2024   Exercise   Days per week of moderate/strenous exercise 2 days   Average minutes spent exercising at this level 10 min      (!) EXERCISE CONCERN      10/19/2024   Social Factors   Frequency of gathering with friends or relatives More than three times a week   Worry food won't last until get money to buy more No   Food not last or not have enough money for food? No   Do you have housing? (Housing is defined as stable permanent housing and does not include staying ouside in a car, in a tent, in an abandoned building, in an overnight shelter, or couch-surfing.) Yes    Are you worried about losing your housing? No   Lack of transportation? No   Unable to get utilities (heat,electricity)? No            10/19/2024   Fall Risk   Fallen 2 or more times in the past year? No    Trouble with walking or balance? No        Patient-reported          10/19/2024   Dental   Dentist two times every year? Yes            10/19/2024   TB Screening   Were you born outside of the [place]? Yes            Today's PHQ-2 Score:       10/24/2024     3:56 PM   PHQ-2 (  [employer])   Q1: Little interest or pleasure in doing things 0    Q2: Feeling down, depressed or hopeless 0    PHQ-2 Score 0    Q1: Little interest or pleasure in doing things Not at all   Q2: Feeling down, depressed or hopeless Not at all   PHQ-2 Score 0       Patient-reported           10/19/2024   Substance Use   Alcohol more than 3/day or more than 7/wk No   Do you use any other substances recreationally? No        Social History     Tobacco Use    Smoking status: Former     Current packs/day: 0.00     Types: Cigarettes     Start date: 2020     Quit date: 2020     Years since quittin.8    Smokeless tobacco: Never   Vaping Use    Vaping status: Never Used   Substance Use Topics    Alcohol use: Yes     Alcohol/week: 7.0 standard drinks of alcohol     Comment: 2 drinks a week    Drug use: No           10/19/2024   STI Screening   New sexual partner(s) since last STI/HIV test? No      Last PSA:   PSA   Date Value Ref Range Status   2020 1.51 0 - 4 ug/L Final     Comment:     Assay Method:  Chemiluminescence using Siemens Vista analyzer     Prostate Specific Antigen Screen   Date Value Ref Range Status   10/18/2023 2.34 0.00 - 4.50 ng/mL Final   2022 2.16 0.00 - 4.00 ug/L Final     ASCVD Risk   The 10-year ASCVD risk score (Leila HOWARD, et al., 2019) is: 11.2%    Values used to calculate the score:      Age: 63 years      Sex: Male      Is Non- : No      Diabetic: No      Tobacco smoker:  No      Systolic Blood Pressure: 127 mmHg      Is BP treated: Yes      HDL Cholesterol: 31 mg/dL      Total Cholesterol: 144 mg/dL           Reviewed and updated as needed this visit by Provider                    Past Medical History:   Diagnosis Date    Adenomatous colon polyp 03/20/2015    repeat in 3 years     Hernia, abdominal     Hypertension     Primary osteoarthritis of right knee 09/28/2021    Skin cancer     Dr. Antoine Aguirre MD DERMATOLOGY SPECIALIST      Past Surgical History:   Procedure Laterality Date    ABDOMEN SURGERY      CHOLECYSTECTOMY      open    CIRCUMCISION N/A 8/26/2024    Procedure: CIRCUMCISION WITH DORSAL PENILE BLOCK;  Surgeon: Christophe Farmer MD;  Location:  OR    COLONOSCOPY  03/20/2015    hx polyps    COLONOSCOPY N/A 03/20/2015    Procedure: COMBINED COLONOSCOPY, SINGLE OR MULTIPLE BIOPSY/POLYPECTOMY BY BIOPSY;  Surgeon: Chun Joe MD;  Location:  GI    COLONOSCOPY N/A 10/27/2021    Procedure: COLONOSCOPY, FLEXIBLE, WITH LESION REMOVAL USING SNARE;  Surgeon: Rocio Stahl MD;  Location:  GI    COLONOSCOPY N/A 11/24/2023    Procedure: Colonoscopy;  Surgeon: Sherry Minaya MD;  Location:  GI    ESOPHAGOSCOPY, GASTROSCOPY, DUODENOSCOPY (EGD), COMBINED N/A 09/29/2022    Procedure: ESOPHAGOGASTRODUODENOSCOPY, WITH BIOPSY;  Surgeon: Avel Carver MD;  Location:  GI    ESOPHAGOSCOPY, GASTROSCOPY, DUODENOSCOPY (EGD), COMBINED N/A 11/24/2023    Procedure: Esophagoscopy, gastroscopy, duodenoscopy (EGD), combined;  Surgeon: Sherry Minaya MD;  Location:  GI    HERNIA REPAIR      inguinal hernia repair     BP Readings from Last 3 Encounters:   10/24/24 127/77   08/26/24 122/81   08/05/24 125/78    Wt Readings from Last 3 Encounters:   10/24/24 116.3 kg (256 lb 6.4 oz)   08/26/24 115.3 kg (254 lb 1.6 oz)   08/05/24 116.4 kg (256 lb 11.2 oz)                  Patient Active Problem List   Diagnosis    Benign essential hypertension    Adenomatous colon  polyp    Hyperlipidemia LDL goal <100    Morbid obesity (H)    Primary osteoarthritis of right knee     Past Surgical History:   Procedure Laterality Date    ABDOMEN SURGERY      CHOLECYSTECTOMY      open    CIRCUMCISION N/A 2024    Procedure: CIRCUMCISION WITH DORSAL PENILE BLOCK;  Surgeon: Christophe Farmer MD;  Location:  OR    COLONOSCOPY  2015    hx polyps    COLONOSCOPY N/A 2015    Procedure: COMBINED COLONOSCOPY, SINGLE OR MULTIPLE BIOPSY/POLYPECTOMY BY BIOPSY;  Surgeon: Chun Joe MD;  Location:  GI    COLONOSCOPY N/A 10/27/2021    Procedure: COLONOSCOPY, FLEXIBLE, WITH LESION REMOVAL USING SNARE;  Surgeon: Rocio Stahl MD;  Location:  GI    COLONOSCOPY N/A 2023    Procedure: Colonoscopy;  Surgeon: Sherry Minaya MD;  Location:  GI    ESOPHAGOSCOPY, GASTROSCOPY, DUODENOSCOPY (EGD), COMBINED N/A 2022    Procedure: ESOPHAGOGASTRODUODENOSCOPY, WITH BIOPSY;  Surgeon: Avel Carver MD;  Location:  GI    ESOPHAGOSCOPY, GASTROSCOPY, DUODENOSCOPY (EGD), COMBINED N/A 2023    Procedure: Esophagoscopy, gastroscopy, duodenoscopy (EGD), combined;  Surgeon: Sherry Minaya MD;  Location: [facility]    HERNIA REPAIR      inguinal hernia repair       Social History     Tobacco Use    Smoking status: Former     Current packs/day: 0.00     Types: Cigarettes     Start date: 2020     Quit date: 2020     Years since quittin.8    Smokeless tobacco: Never   Substance Use Topics    Alcohol use: Yes     Alcohol/week: 7.0 standard drinks of alcohol     Comment: 2 drinks a week     Family History   Problem Relation Age of Onset    Cerebrovascular Disease Mother     Diabetes Mother     Prostate Cancer Father          Current Outpatient Medications   Medication Sig Dispense Refill    aspirin 81 MG EC tablet Take 81 mg by mouth daily      olmesartan-hydrochlorothiazide (BENICAR HCT) 20-12.5 MG tablet Take 1 tablet by mouth daily. 90 tablet 0    acetaminophen  (TYLENOL) 500 MG tablet Take 2 tablets (1,000 mg) by mouth every 6 hours as needed for mild pain. 100 tablet 0    bacitracin 500 UNIT/GM external ointment Apply topically 2 times daily. 30 g 0    docusate sodium (COLACE) 100 MG capsule Take 1 capsule (100 mg) by mouth 2 times daily. 30 capsule 0       A 10 organ systems ROS is negative other than any pertinent positives or negatives previously stated.      Objective    Exam  /77 (BP Location: Right arm, Patient Position: Sitting, Cuff Size: Adult Large)   Pulse 90   Temp 98.6  F (37  C) (Temporal)   Resp 18   Ht 1.829 m (6')   Wt 116.3 kg (256 lb 6.4 oz)   SpO2 96%   BMI 34.77 kg/m     Estimated body mass index is 34.77 kg/m  as calculated from the following:    Height as of this encounter: 1.829 m (6').    Weight as of this encounter: 116.3 kg (256 lb 6.4 oz).    Physical Exam  GENERAL: alert and no distress  EYES: Eyes grossly normal to inspection, PERRL and conjunctivae and sclerae normal  HENT: ear canals and TM's normal, nose and mouth without ulcers or lesions  NECK: no adenopathy, no asymmetry, masses, or scars  RESP: lungs clear to auscultation - no rales, rhonchi or wheezes  CV: regular rate and rhythm, normal S1 S2, no S3 or S4, no murmur, click or rub, no peripheral edema  ABDOMEN: soft, nontender, no hepatosplenomegaly, no masses and bowel sounds normal  RECTAL: Exam declined today  MS: no gross musculoskeletal defects noted, no edema  SKIN: no suspicious lesions or rashes  NEURO: Normal strength and tone, mentation intact and speech normal  PSYCH: mentation appears normal, affect normal/bright        Signed Electronically by: Bryan Fragoso MD      The 10-year ASCVD risk score (Leila HOWARD, et al., 2019) is: 11.2%    Values used to calculate the score:      Age: 63 years      Sex: Male      Is Non- : No      Diabetic: No      Tobacco smoker: No      Systolic Blood Pressure: 127 mmHg      Is BP treated: Yes      HDL  Cholesterol: 31 mg/dL      Total Cholesterol: 144 mg/dL

## 2024-10-25 LAB
ALBUMIN SERPL BCG-MCNC: 4.3 G/DL (ref 3.5–5.2)
ALP SERPL-CCNC: 139 U/L (ref 40–150)
ALT SERPL W P-5'-P-CCNC: 33 U/L (ref 0–70)
ANION GAP SERPL CALCULATED.3IONS-SCNC: 12 MMOL/L (ref 7–15)
AST SERPL W P-5'-P-CCNC: 22 U/L (ref 0–45)
BILIRUB SERPL-MCNC: 0.4 MG/DL
BUN SERPL-MCNC: 15.4 MG/DL (ref 8–23)
CALCIUM SERPL-MCNC: 8.9 MG/DL (ref 8.8–10.4)
CHLORIDE SERPL-SCNC: 103 MMOL/L (ref 98–107)
CHOLEST SERPL-MCNC: 160 MG/DL
CREAT SERPL-MCNC: 0.97 MG/DL (ref 0.67–1.17)
EGFRCR SERPLBLD CKD-EPI 2021: 89 ML/MIN/1.73M2
FASTING STATUS PATIENT QL REPORTED: NO
FASTING STATUS PATIENT QL REPORTED: NO
GLUCOSE SERPL-MCNC: 119 MG/DL (ref 70–99)
HCO3 SERPL-SCNC: 25 MMOL/L (ref 22–29)
HDLC SERPL-MCNC: 29 MG/DL
LDLC SERPL CALC-MCNC: 95 MG/DL
NONHDLC SERPL-MCNC: 131 MG/DL
POTASSIUM SERPL-SCNC: 3.9 MMOL/L (ref 3.4–5.3)
PROT SERPL-MCNC: 7.3 G/DL (ref 6.4–8.3)
PSA SERPL DL<=0.01 NG/ML-MCNC: 1.9 NG/ML (ref 0–4.5)
SODIUM SERPL-SCNC: 140 MMOL/L (ref 135–145)
TRIGL SERPL-MCNC: 179 MG/DL

## 2024-10-25 NOTE — RESULT ENCOUNTER NOTE
"The following letter pertains to your most recent diagnostic tests:    The ratio of triglycerides to \"good cholesterol\" HDL is too high.  This can be improved by decreasing calories consumed particularly calories from carbohydrates and increasing physical activity.  Try to be active 150 minutes each week.  Cut back on starches and sugars.    The metabolic panel is ok except for the glucose.  The Glucose (blood sugar) is elevated but it is not in the diabetic range (diabetes is defined as a fasting blood sugar reading greater than 126 on two separate occassions).      -Your prostate specific antigen (PSA) test result returned normal.    The hemoglobin has improved and it is nearly normal.   I think you should keep taking the iron supplement as you are.             Follow up:  Schedule a lab appointment in one month to recheck the hemoglobin A1c.    Schedule the CT scans that we discussed.      Sincerely,    Dr. Fragoso"

## 2024-11-04 ENCOUNTER — HOSPITAL ENCOUNTER (OUTPATIENT)
Dept: CT IMAGING | Facility: CLINIC | Age: 64
Discharge: HOME | End: 2024-11-04
Attending: INTERNAL MEDICINE | Admitting: INTERNAL MEDICINE
Payer: COMMERCIAL

## 2024-11-04 DIAGNOSIS — Z87.891 PERSONAL HISTORY OF TOBACCO USE: ICD-10-CM

## 2024-11-04 PROCEDURE — 71271 CT THORAX LUNG CANCER SCR C-: CPT

## 2024-11-05 NOTE — RESULT ENCOUNTER NOTE
The following letter pertains to your most recent diagnostic tests:    Good news! The lung cancer screening test is negative.  We should recheck in 12 months.      Sincerely,    Dr. Fragoso

## 2024-11-25 ENCOUNTER — LAB (OUTPATIENT)
Dept: LAB | Facility: CLINIC | Age: 64
End: 2024-11-25
Attending: INTERNAL MEDICINE
Payer: COMMERCIAL

## 2024-11-25 DIAGNOSIS — R73.03 PREDIABETES: ICD-10-CM

## 2024-11-25 LAB
EST. AVERAGE GLUCOSE BLD GHB EST-MCNC: 143 MG/DL
HBA1C MFR BLD: 6.6 % (ref 0–5.6)

## 2024-11-25 PROCEDURE — 83036 HEMOGLOBIN GLYCOSYLATED A1C: CPT

## 2024-11-25 PROCEDURE — 36415 COLL VENOUS BLD VENIPUNCTURE: CPT

## 2024-12-05 ENCOUNTER — VIRTUAL VISIT (OUTPATIENT)
Dept: FAMILY MEDICINE | Facility: CLINIC | Age: 64
End: 2024-12-05
Payer: COMMERCIAL

## 2024-12-05 DIAGNOSIS — E78.5 HYPERLIPIDEMIA LDL GOAL <70: ICD-10-CM

## 2024-12-05 DIAGNOSIS — E11.9 TYPE 2 DIABETES MELLITUS WITHOUT COMPLICATION, WITHOUT LONG-TERM CURRENT USE OF INSULIN (H): Primary | ICD-10-CM

## 2024-12-05 RX ORDER — ATORVASTATIN CALCIUM 10 MG/1
10 TABLET, FILM COATED ORAL DAILY
Qty: 90 TABLET | Refills: 3 | Status: SHIPPED | OUTPATIENT
Start: 2024-12-05

## 2024-12-05 NOTE — PROGRESS NOTES
Ricki is a 64 year old who is being evaluated via a billable video visit.    How would you like to obtain your AVS? MyChart  If the video visit is dropped, the invitation should be resent by: Text to cell phone: [phone]  Will anyone else be joining your video visit? No      Assessment & Plan     Type 2 diabetes mellitus without complication, without long-term current use of insulin (H)  I think it is reasonable to work on diet interventions before starting medicines for glycemic control given his very borderline A1c result.  Plan for recheck of the A1c in a 3-month interval.  Also, I recommended diabetes education to help him with his diet choices.  - Adult Diabetes Education  Referral; Future    Hyperlipidemia LDL goal <70  Given the minimal coronary vascular calcifications noted on his lung cancer screening CT scan and his elevated atherosclerotic coronary vascular disease risk score, despite the fact that he has naturally quite low cholesterol levels, I think he could benefit from statin therapy for primary prevention.  We discussed this rationale and we discussed potential risks and side effects of Lipitor.  He would like to start Lipitor.  Goal LDL would be that less than 70.  We can recheck the lipids when he returns in 3 months to have his A1c checked  - atorvastatin (LIPITOR) 10 MG tablet; Take 1 tablet (10 mg) by mouth daily.            FUTURE APPOINTMENTS:       -Lab appointment in 3 months to recheck the hemoglobin A1c and lipid panel on atorvastatin    Subjective   Ricki is a 64 year old, presenting for the following health issues:  Results        12/5/2024     3:27 PM   Additional Questions   Roomed by Chad   Accompanied by Not applicable, by themselves     History of Present Illness       Reason for visit:  Elevate glucose  Symptom onset:  More than a month  Symptom intensity:  Mild  Symptom progression:  Staying the same  Had these symptoms before:  Yes  Has tried/received treatment  for these symptoms:  No   He is taking medications regularly.         Discussed elevated hemoglobin A1c  He admits to diet indiscretions between the last 2 A1c checks  He commits himself to making better diet choices going forward and would like to avoid taking medications for glycemic control if possible  We also discussed his CT scan result which showed minimal coronary vascular calcifications which is a new finding from previous CT scans          Objective           Vitals:  No vitals were obtained today due to virtual visit.    Physical Exam   GENERAL: alert and no distress  EYES: Eyes grossly normal to inspection.  No discharge or erythema, or obvious scleral/conjunctival abnormalities.  RESP: No audible wheeze, cough, or visible cyanosis.    SKIN: Visible skin clear. No significant rash, abnormal pigmentation or lesions.  NEURO: Cranial nerves grossly intact.  Mentation and speech appropriate for age.  PSYCH: Appropriate affect, tone, and pace of words    I could see him on the video, he could not see me      Video-Visit Details      17:10 to 17:26      Type of service:  Video Visit   Originating Location (pt. Location): Home    Distant Location (provider location):  On-site  Platform used for Video Visit: Franko  Signed Electronically by: Bryan Fragoso MD      The 10-year ASCVD risk score (Leila HOWARD, et al., 2019) is: 13.8%    Values used to calculate the score:      Age: 64 years      Sex: Male      Is Non- : No      Diabetic: No      Tobacco smoker: No      Systolic Blood Pressure: 127 mmHg      Is BP treated: Yes      HDL Cholesterol: 29 mg/dL      Total Cholesterol: 160 mg/dL

## 2024-12-15 ENCOUNTER — MYC REFILL (OUTPATIENT)
Dept: FAMILY MEDICINE | Facility: CLINIC | Age: 64
End: 2024-12-15
Payer: COMMERCIAL

## 2024-12-15 DIAGNOSIS — I10 BENIGN ESSENTIAL HYPERTENSION: ICD-10-CM

## 2024-12-16 RX ORDER — OLMESARTAN MEDOXOMIL AND HYDROCHLOROTHIAZIDE 20/12.5 20; 12.5 MG/1; MG/1
1 TABLET ORAL DAILY
Qty: 90 TABLET | Refills: 2 | Status: SHIPPED | OUTPATIENT
Start: 2024-12-16

## 2025-01-27 ENCOUNTER — VIRTUAL VISIT (OUTPATIENT)
Dept: EDUCATION SERVICES | Facility: CLINIC | Age: 65
End: 2025-01-27
Attending: INTERNAL MEDICINE
Payer: COMMERCIAL

## 2025-01-27 DIAGNOSIS — E11.9 TYPE 2 DIABETES MELLITUS WITHOUT COMPLICATION, WITHOUT LONG-TERM CURRENT USE OF INSULIN (H): ICD-10-CM

## 2025-01-27 PROCEDURE — 98967 PH1 ASSMT&MGMT NQHP 11-20: CPT | Mod: 93 | Performed by: DIETITIAN, REGISTERED

## 2025-01-27 NOTE — LETTER
1/27/2025         RE: Ricki Acuna  [address] [place] [address]        Dear Colleague,    Thank you for referring your patient, Ricki Acuna, to the [facility] [place]. Please see a copy of my visit note below.    Diabetes Self-Management Education & Support    Presents for: Initial Assessment for new diagnosis    Type of Service: Telephone Visit    Originating Location (Patient Location): Home  Distant Location (Provider Location): Offsite  Mode of Communication:  Telephone    Telephone Visit Start Time:  3:36p  Telephone Visit End Time (telephone visit stop time): 3:47p    How would patient like to obtain AVS? MyChart      Assessment  Ricki has been working hard at adjusting diet, increasing exercise to control his blood sugars. He reports his mom has diabetes. His wife is a RN at [employer] and has already gotten him the Understanding Diabetes booklet, which he is using for diet adjustments. He is agreeable to group classes and we got him singed up today. He does not have any further questions today.     Patient's most recent   Lab Results   Component Value Date    A1C 6.6 11/25/2024     is meeting goal of <7.0    Diabetes knowledge and skills assessment:   Patient is knowledgeable in diabetes management concepts related to: none, new diagnosis     Based on learning assessment above, most appropriate setting for further diabetes education would be: Group class setting.    Care Plan and Education Provided:  Healthy Eating: Balanced meals and Healthy Coping: Benefits of making appropriate lifestyle changes    Patient verbalized understanding of diabetes self-management education concepts discussed, opportunities for ongoing education and support, and recommendations provided today.    Plan    Attend all 3 group classes   Consider one on one follow up as needed after class course   Topics to cover at upcoming visits: Healthy Eating, Being Active, Monitoring, Taking Medication,  Problem Solving, Reducing Risks, and Healthy Coping    Follow-up:  Upcoming Diabetes Ed Appointments     Visit Type Date Time Department   Zoom T2DM class 2/27, 3/6, 3/13       See Care Plan for co-developed, patient-state behavior change goals.    Education Materials Provided:  - [employer] [employer] Understanding Diabetes Booklet   - My Plate Planner   - Blood Glucose Log Sheet   - Goals for Your Diabetes Care   - Types of Diabetes Medicines  - ADCES Diabetes Distress handout      Subjective/Objective  Ricki is an 64 year old year old, presenting for the following diabetes education related to: Presents for: Initial Assessment for new diagnosis  Accompanied by: Self  Diabetes education in the past 24mo: No  Focus of Visit: Patient Unsure  Diabetes type: Type 2  Date of diagnosis: 12/2024  Disease course: Improving  How confident are you filling out medical forms by yourself:: Not Assessed  Transportation concerns: No  Cultural Influences/Ethnic Background:  Not  or     Diabetes Symptoms & Complications:  Weight trend: Stable  Symptom course: Stable  Disease course: Improving  Complications assessed today?: No    Patient Problem List and Family Medical History reviewed for relevant medical history, current medical status, and diabetes risk factors.    Vitals:  There were no vitals taken for this visit.  Estimated body mass index is 34.77 kg/m  as calculated from the following:    Height as of 10/24/24: 1.829 m (6').    Weight as of 10/24/24: 116.3 kg (256 lb 6.4 oz).   Last 3 BP:   BP Readings from Last 3 Encounters:   10/24/24 127/77   08/26/24 122/81   08/05/24 125/78       History   Smoking Status    Former    Types: Cigarettes   Smokeless Tobacco    Never       Labs:  Lab Results   Component Value Date    A1C 6.6 11/25/2024     Lab Results   Component Value Date     10/24/2024     09/22/2022     08/31/2020     Lab Results   Component Value Date    LDL 95 10/24/2024      "08/31/2020     HDL Cholesterol   Date Value Ref Range Status   08/31/2020 31 (L) >39 mg/dL Final     Direct Measure HDL   Date Value Ref Range Status   10/24/2024 29 (L) >=40 mg/dL Final   ]  GFR Estimate   Date Value Ref Range Status   10/24/2024 89 >60 mL/min/1.73m2 Final     Comment:     eGFR calculated using 2021 CKD-EPI equation.   08/31/2020 >90 >60 mL/min/[1.73_m2] Final     Comment:     Non  GFR Calc  Starting 12/18/2018, serum creatinine based estimated GFR (eGFR) will be   calculated using the Chronic Kidney Disease Epidemiology Collaboration   (CKD-EPI) equation.       GFR Estimate If Black   Date Value Ref Range Status   08/31/2020 >90 >60 mL/min/[1.73_m2] Final     Comment:      GFR Calc  Starting 12/18/2018, serum creatinine based estimated GFR (eGFR) will be   calculated using the Chronic Kidney Disease Epidemiology Collaboration   (CKD-EPI) equation.       Lab Results   Component Value Date    CR 0.97 10/24/2024    CR 0.79 08/31/2020     No results found for: \"MICROALBUMIN\"    Healthy Eating:  Healthy Eating Assessed Today: Yes  Meal planning/habits: Smaller portions  Who cooks/prepares meals for you?: Self, Spouse  Who purchases food in  your home?: Spouse, Self  Other: Wife is helping to improve diet, using Understanding Diabetes booklet already    Being Active:  Being Active Assessed Today: Yes  Exercise:: Yes  Days per week of moderate to strenuous exercise (like a brisk walk): 4  On average, minutes per day of exercise at this level: 30  How intense was your typical exercise? : Moderate (like brisk walking)  Exercise Minutes per Week: 120  Barrier to exercise: None    Healthy Coping:  Healthy Coping Assessed Today: Yes  Emotional response to diabetes: Ready to learn, Concern for health and well-being  Informal Support system:: Spouse  Stage of change: ACTION (Actively working towards change)  Support resources: Other    Kika Ovalle RD  Time Spent: 11 " minutes  Encounter Type: Individual    Any diabetes medication dose changes were [facility] Standing Orders under the patient's referring provider.

## 2025-01-27 NOTE — PROGRESS NOTES
Diabetes Self-Management Education & Support    Presents for: Initial Assessment for new diagnosis    Type of Service: Telephone Visit    Originating Location (Patient Location): Home  Distant Location (Provider Location): Offsite  Mode of Communication:  Telephone    Telephone Visit Start Time:  3:36p  Telephone Visit End Time (telephone visit stop time): 3:47p    How would patient like to obtain AVS? MyChart      Assessment  Ricki has been working hard at adjusting diet, increasing exercise to control his blood sugars. He reports his mom has diabetes. His wife is a RN at [employer] and has already gotten him the Understanding Diabetes booklet, which he is using for diet adjustments. He is agreeable to group classes and we got him singed up today. He does not have any further questions today.     Patient's most recent   Lab Results   Component Value Date    A1C 6.6 11/25/2024     is meeting goal of <7.0    Diabetes knowledge and skills assessment:   Patient is knowledgeable in diabetes management concepts related to: none, new diagnosis     Based on learning assessment above, most appropriate setting for further diabetes education would be: Group class setting.    Care Plan and Education Provided:  Healthy Eating: Balanced meals and Healthy Coping: Benefits of making appropriate lifestyle changes    Patient verbalized understanding of diabetes self-management education concepts discussed, opportunities for ongoing education and support, and recommendations provided today.    Plan    Attend all 3 group classes   Consider one on one follow up as needed after class course   Topics to cover at upcoming visits: Healthy Eating, Being Active, Monitoring, Taking Medication, Problem Solving, Reducing Risks, and Healthy Coping    Follow-up:  Upcoming Diabetes Ed Appointments     Visit Type Date Time Department   Zoom T2DM class 2/27, 3/6, 3/13       See Care Plan for co-developed, patient-state behavior change  goals.    Education Materials Provided:  -  [employer] Understanding Diabetes Booklet   - My Plate Planner   - Blood Glucose Log Sheet   - Goals for Your Diabetes Care   - Types of Diabetes Medicines  - ADCES Diabetes Distress handout      Subjective/Objective  Ricki is an 64 year old year old, presenting for the following diabetes education related to: Presents for: Initial Assessment for new diagnosis  Accompanied by: Self  Diabetes education in the past 24mo: No  Focus of Visit: Patient Unsure  Diabetes type: Type 2  Date of diagnosis: 12/2024  Disease course: Improving  How confident are you filling out medical forms by yourself:: Not Assessed  Transportation concerns: No  Cultural Influences/Ethnic Background:  Not  or     Diabetes Symptoms & Complications:  Weight trend: Stable  Symptom course: Stable  Disease course: Improving  Complications assessed today?: No    Patient Problem List and Family Medical History reviewed for relevant medical history, current medical status, and diabetes risk factors.    Vitals:  There were no vitals taken for this visit.  Estimated body mass index is 34.77 kg/m  as calculated from the following:    Height as of 10/24/24: 1.829 m (6').    Weight as of 10/24/24: 116.3 kg (256 lb 6.4 oz).   Last 3 BP:   BP Readings from Last 3 Encounters:   10/24/24 127/77   08/26/24 122/81   08/05/24 125/78       History   Smoking Status    Former    Types: Cigarettes   Smokeless Tobacco    Never       Labs:  Lab Results   Component Value Date    A1C 6.6 11/25/2024     Lab Results   Component Value Date     10/24/2024     09/22/2022     08/31/2020     Lab Results   Component Value Date    LDL 95 10/24/2024     08/31/2020     HDL Cholesterol   Date Value Ref Range Status   08/31/2020 31 (L) >39 mg/dL Final     Direct Measure HDL   Date Value Ref Range Status   10/24/2024 29 (L) >=40 mg/dL Final   ]  GFR Estimate   Date Value Ref Range Status  "  10/24/2024 89 >60 mL/min/1.73m2 Final     Comment:     eGFR calculated using 2021 CKD-EPI equation.   08/31/2020 >90 >60 mL/min/[1.73_m2] Final     Comment:     Non  GFR Calc  Starting 12/18/2018, serum creatinine based estimated GFR (eGFR) will be   calculated using the Chronic Kidney Disease Epidemiology Collaboration   (CKD-EPI) equation.       GFR Estimate If Black   Date Value Ref Range Status   08/31/2020 >90 >60 mL/min/[1.73_m2] Final     Comment:      GFR Calc  Starting 12/18/2018, serum creatinine based estimated GFR (eGFR) will be   calculated using the Chronic Kidney Disease Epidemiology Collaboration   (CKD-EPI) equation.       Lab Results   Component Value Date    CR 0.97 10/24/2024    CR 0.79 08/31/2020     No results found for: \"MICROALBUMIN\"    Healthy Eating:  Healthy Eating Assessed Today: Yes  Meal planning/habits: Smaller portions  Who cooks/prepares meals for you?: Self, Spouse  Who purchases food in  your home?: Spouse, Self  Other: Wife is helping to improve diet, using Understanding Diabetes booklet already    Being Active:  Being Active Assessed Today: Yes  Exercise:: Yes  Days per week of moderate to strenuous exercise (like a brisk walk): 4  On average, minutes per day of exercise at this level: 30  How intense was your typical exercise? : Moderate (like brisk walking)  Exercise Minutes per Week: 120  Barrier to exercise: None    Healthy Coping:  Healthy Coping Assessed Today: Yes  Emotional response to diabetes: Ready to learn, Concern for health and well-being  Informal Support system:: Spouse  Stage of change: ACTION (Actively working towards change)  Support resources: Other    Kika Ovalle RD  Time Spent: 11 minutes  Encounter Type: Individual    Any diabetes medication dose changes were made via the CDCES Standing Orders under the patient's referring provider.  "

## 2025-01-27 NOTE — PATIENT INSTRUCTIONS
You are signed up for upcoming virtual type 2 classes. Class topics and dates are listed below:     Healthy Eating & Being Active -  at 2:30p  Monitoring & Medication - 3/6 at 2:30p  Problem Solving, Reducing Risks & Healthy Coping - 3/13 at 2:30p    You will find the classes listed on your appointment schedule on [email or web address]. On the day of your class, you will enter class via [email or web address] and will be taken to the [employer] platform.   Please try to log in ~10 minutes before the class start to ensure you have appropriate software or in case you have any issues.      Call or send a [email or web address] message with any questions or concerns    Kika Ovalle RD, LD, [facility] [place]   Diabetes Education Triage Line: [phone]  Diabetes Education Appointment [phone]

## 2025-02-12 NOTE — PROGRESS NOTES
HPI    3 days prior to consultation, the patient received his tetanus booster (injected at the left deltoid) at the clinic    Yesterday, the patient noticed a small localized area of redness and soreness at the injection site.    Today, he has noticed the enlargement of the area of redness. He also started feeling feverish. No discharge noted from the injection site.      Past Medical History:   Diagnosis Date     Adenomatous colon polyp 3-20-15    repeat in 3 years      Hypertension      Skin cancer     Dr. Antoine Aguirre MD DERMATOLOGY SPECIALIST        Review of Systems   Constitutional: Positive for chills, fever and malaise/fatigue.   Respiratory: Negative for shortness of breath.    Gastrointestinal: Negative for diarrhea, nausea and vomiting.   Musculoskeletal: Negative for myalgias.   Neurological: Negative for dizziness.       BP (!) 144/91  Pulse 108  Temp 99.7  F (37.6  C) (Tympanic)  SpO2 98%      Physical Exam   Constitutional: He is oriented to person, place, and time. No distress.   Pulmonary/Chest: Effort normal. No respiratory distress.   Lymphadenopathy:     He has no cervical adenopathy.   Neurological: He is alert and oriented to person, place, and time. GCS score is 15.   Skin:   (+) Erythematous, warm-to-touch, mildly tender and mildly swollen patch at the lateral aspect of the left upper arm; no discharge or abscess formation   Vitals reviewed.        ICD-10-CM    1. Cellulitis of left upper extremity L03.114 cephALEXin (KEFLEX) 500 MG capsule       Patient Instructions   Call doctor if your left upper arm redness persist/worsens, or if you develop new symptoms or side effects from the medication.           Last Zometa:  10/15/2024    Labs reviewed: Cr and Ca WNL    Patient denied any recent or upcoming dental work that would prevent pt from receiving Zometa today.    Patient stated she takes calcium and Vit D supplements at home:   No    Patient tolerated Zometa well today; no adverse reaction noted.  No significant complaints voiced.  IV discontinued with catheter intact and pressure dressing applied to the site.  Has f/u appt(s) scheduled per MD request.  No questions or concerns voiced.  NAD noted upon discharge.

## 2025-02-27 ENCOUNTER — VIRTUAL VISIT (OUTPATIENT)
Dept: EDUCATION SERVICES | Facility: CLINIC | Age: 65
End: 2025-02-27
Payer: COMMERCIAL

## 2025-02-27 DIAGNOSIS — E11.9 TYPE 2 DIABETES MELLITUS WITHOUT COMPLICATION, WITHOUT LONG-TERM CURRENT USE OF INSULIN (H): Primary | ICD-10-CM

## 2025-02-27 NOTE — LETTER
2025         RE: Ricki Acuna  [address] [place] [address]        Dear Colleague,    Thank you for referring your patient, Ricki Acuna, to the [facility]. Please see a copy of my visit note below.    Diabetes Self-Management Training Virtual  Class - Healthy Eating & Being Active    Type of service:  Video Visit    Video Start Time: 2:30pm  Video End Time (time video stopped): 3:30pm    Patient presents today for group education related to Type 2 diabetes     Educational Topics Discussed  Pathophysiology of Type 2 Diabetes, A1C Value, Carbohydrate Counting, Label Reading, Plate Planner Approach, Carb Counting Practice, Heart Healthy diet, Exercise - why it is helpful for BG control    Quick Review of other class topics - Monitoring & Diabetes Treatment Options, Hyperglycemia & Hypoglycemia Prevention and Symptoms, Risk Reduction, Sleep, Diabetes Distress    Materials Provided  Understanding Diabetes  Blood sugar log sheet  Goals of Diabetes Care  MyPlate Planner  Medications for Diabetes  ADCES Diabetes Distress    All questions were answered in the group setting. Patient to contact educator with specific questions, should need arise.    Plan and Follow-up  Patient to begin carb counting and follow recommended meal plan.  Patient to attend all Diabetes Self-Management Training Classes.  Patient to follow-up with diabetes educator one-on-one, as needed, after classes completed.     Patient was encouraged to create a patient-stated goal and share with class instructor via class chat or Mychart.     Camryn Rojas, EYLITZA, RDN, LD, CDCES  Diabetes     [phone]  Diabetes Education Care Team: [phone]    Time Spent: 60 minutes

## 2025-02-27 NOTE — PROGRESS NOTES
Diabetes Self-Management Training Virtual  Class - Healthy Eating & Being Active    Type of service:  Video Visit    Video Start Time: 2:30pm  Video End Time (time video stopped): 3:30pm    Patient presents today for group education related to Type 2 diabetes     Educational Topics Discussed  Pathophysiology of Type 2 Diabetes, A1C Value, Carbohydrate Counting, Label Reading, Plate Planner Approach, Carb Counting Practice, Heart Healthy diet, Exercise - why it is helpful for BG control    Quick Review of other class topics - Monitoring & Diabetes Treatment Options, Hyperglycemia & Hypoglycemia Prevention and Symptoms, Risk Reduction, Sleep, Diabetes Distress    Materials Provided  Understanding Diabetes  Blood sugar log sheet  Goals of Diabetes Care  MyPlate Planner  Medications for Diabetes  ADCES Diabetes Distress    All questions were answered in the group setting. Patient to contact educator with specific questions, should need arise.    Plan and Follow-up  Patient to begin carb counting and follow recommended meal plan.  Patient to attend all Diabetes Self-Management Training Classes.  Patient to follow-up with diabetes educator one-on-one, as needed, after classes completed.     Patient was encouraged to create a patient-stated goal and share with class instructor via class chat or Mychart.     Camryn Rojas, YELITZA, RDN, LD, [facility]  Diabetes     [phone]  Diabetes Education Care Team: [phone]    Time Spent: 60 minutes

## 2025-03-08 ENCOUNTER — HEALTH MAINTENANCE LETTER (OUTPATIENT)
Age: 65
End: 2025-03-08

## 2025-07-13 ENCOUNTER — HEALTH MAINTENANCE LETTER (OUTPATIENT)
Age: 65
End: 2025-07-13

## (undated) DEVICE — ESU PENCIL W/HOLSTER E2350H

## (undated) DEVICE — BNDG ROLLER GAUZE CONFORM 2"X4YD 41-52

## (undated) DEVICE — SOL NACL 0.9% IRRIG 1000ML BOTTLE 2F7124

## (undated) DEVICE — SU CHROMIC 3-0 PS-2 27" 1638H

## (undated) DEVICE — PREP SKIN SCRUB TRAY 4461A

## (undated) DEVICE — SOL WATER IRRIG 1000ML BOTTLE 2F7114

## (undated) DEVICE — SU SILK 3-0 SH 30" K832H

## (undated) DEVICE — LINEN TOWEL PACK X5 5464

## (undated) DEVICE — NDL 27GA 1.25" 305136

## (undated) DEVICE — PACK MINOR SBA15MIFSE

## (undated) DEVICE — ESU ELEC BLADE 2.75" COATED/INSULATED E1455

## (undated) DEVICE — DECANTER BAG 2002S

## (undated) DEVICE — DRAPE MINOR PROCEDURE LAP 29496

## (undated) DEVICE — ESU GROUND PAD ADULT W/CORD E7507

## (undated) DEVICE — BLADE KNIFE SURG 15 371115

## (undated) RX ORDER — DEXAMETHASONE SODIUM PHOSPHATE 4 MG/ML
INJECTION, SOLUTION INTRA-ARTICULAR; INTRALESIONAL; INTRAMUSCULAR; INTRAVENOUS; SOFT TISSUE
Status: DISPENSED
Start: 2024-08-26

## (undated) RX ORDER — PROPOFOL 10 MG/ML
INJECTION, EMULSION INTRAVENOUS
Status: DISPENSED
Start: 2024-08-26

## (undated) RX ORDER — FENTANYL CITRATE 50 UG/ML
INJECTION, SOLUTION INTRAMUSCULAR; INTRAVENOUS
Status: DISPENSED
Start: 2024-08-26

## (undated) RX ORDER — FENTANYL CITRATE 50 UG/ML
INJECTION, SOLUTION INTRAMUSCULAR; INTRAVENOUS
Status: DISPENSED
Start: 2021-10-27

## (undated) RX ORDER — BUPIVACAINE HYDROCHLORIDE 5 MG/ML
INJECTION, SOLUTION EPIDURAL; INTRACAUDAL
Status: DISPENSED
Start: 2024-08-26

## (undated) RX ORDER — FENTANYL CITRATE 50 UG/ML
INJECTION, SOLUTION INTRAMUSCULAR; INTRAVENOUS
Status: DISPENSED
Start: 2019-09-23

## (undated) RX ORDER — FENTANYL CITRATE 50 UG/ML
INJECTION, SOLUTION INTRAMUSCULAR; INTRAVENOUS
Status: DISPENSED
Start: 2022-09-29

## (undated) RX ORDER — FENTANYL CITRATE 50 UG/ML
INJECTION, SOLUTION INTRAMUSCULAR; INTRAVENOUS
Status: DISPENSED
Start: 2023-11-24

## (undated) RX ORDER — GINSENG 100 MG
CAPSULE ORAL
Status: DISPENSED
Start: 2024-08-26

## (undated) RX ORDER — ONDANSETRON 2 MG/ML
INJECTION INTRAMUSCULAR; INTRAVENOUS
Status: DISPENSED
Start: 2024-08-26